# Patient Record
Sex: MALE | Race: WHITE | NOT HISPANIC OR LATINO | Employment: OTHER | URBAN - METROPOLITAN AREA
[De-identification: names, ages, dates, MRNs, and addresses within clinical notes are randomized per-mention and may not be internally consistent; named-entity substitution may affect disease eponyms.]

---

## 2022-04-13 ENCOUNTER — HOSPITAL ENCOUNTER (OUTPATIENT)
Dept: RADIOLOGY | Age: 62
Discharge: HOME | End: 2022-04-13
Payer: COMMERCIAL

## 2022-04-13 DIAGNOSIS — K76.0 FATTY LIVER: ICD-10-CM

## 2022-04-13 DIAGNOSIS — N13.8 NODULAR PROSTATE WITH URINARY OBSTRUCTION: ICD-10-CM

## 2022-04-13 DIAGNOSIS — N40.3 NODULAR PROSTATE WITH URINARY OBSTRUCTION: ICD-10-CM

## 2022-04-13 DIAGNOSIS — R97.20 ELEVATED PROSTATE SPECIFIC ANTIGEN (PSA): ICD-10-CM

## 2022-04-13 PROCEDURE — A9585 GADOBUTROL INJECTION: HCPCS | Performed by: RADIOLOGY

## 2022-04-13 PROCEDURE — 76705 ECHO EXAM OF ABDOMEN: CPT

## 2022-04-13 PROCEDURE — 76377 3D RENDER W/INTRP POSTPROCES: CPT

## 2022-04-13 PROCEDURE — 72197 MRI PELVIS W/O & W/DYE: CPT

## 2022-04-13 RX ADMIN — GADOBUTROL 9 ML: 604.72 INJECTION INTRAVENOUS at 09:10

## 2022-04-19 ENCOUNTER — TELEPHONE (OUTPATIENT)
Dept: UROLOGY | Facility: AMBULATORY SURGERY CENTER | Age: 62
End: 2022-04-19

## 2022-04-19 NOTE — TELEPHONE ENCOUNTER
Please Triage  New Patient    What is the reason for the patients appointment? MRI findings for prostrate  He stated that his prostrate is elevated  Patient wants to see either Robby Yao or Kimberly Duque    Patient is out of town until next week but no appointments available until July  Patient feels he needs to be seen before then  What office location does the patient prefer? [place]    Imaging/Lab Results: no     Do we accept the patient's insurance or is the patient Self-Pay? Insurance Provider: [facility]  Plan Type/Number:  Member ID#: Has the patient had any previous Urologist(s)? No     Have patient records been requested? If not are records showing in Epic: epic    Has the patient had any outside testing done? epic    Does the patient have a personal history of cancer?  No     Patient can be reached at [phone]

## 2022-04-20 NOTE — TELEPHONE ENCOUNTER
Patient called stating he was returning call to schedule an appointment as new patient   Mri results showed a mass on his prostate  Patient was traveling yesterday and missed the call       Patient can be reached at [phone]

## 2022-04-20 NOTE — TELEPHONE ENCOUNTER
Called and spoke with patient  He was scheduled for appointment with Dr Douglas Rubin on 4/28 in the [facility] office  He was provided with office address

## 2022-04-27 NOTE — PROGRESS NOTES
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  Past Medical History:   History reviewed  No pertinent past medical history  PAST SURGICAL HISTORY:     Past Surgical History:   Procedure Laterality Date    VASECTOMY         CURRENT MEDICATIONS:     Current Outpatient Medications   Medication Sig Dispense Refill    aspirin (ECOTRIN LOW STRENGTH) 81 mg EC tablet Take 81 mg by mouth daily      irbesartan (AVAPRO) 150 mg tablet       rosuvastatin (CRESTOR) 5 mg tablet        No current facility-administered medications for this visit         ALLERGIES:   No Known Allergies    SOCIAL HISTORY:     Social History     Socioeconomic History    Marital status: /Civil Union     Spouse name: None    Number of children: None    Years of education: None    Highest education level: None   Occupational History    None   Tobacco Use    Smoking status: Never Smoker    Smokeless tobacco: Never Used   Vaping Use    Vaping Use: Never used   Substance and Sexual Activity    Alcohol use: Yes     Comment: social    Drug use: Never    Sexual activity: None   Other Topics Concern    None   Social History Narrative    None     Social Determinants of Health     Financial Resource Strain: Not on file   Food Insecurity: Not on file   Transportation Needs: Not on file   Physical Activity: Not on file   Stress: Not on file   Social Connections: Not on file   Intimate Partner Violence: Not on file   Housing Stability: Not on file       SOCIAL HISTORY:     Family History   Problem Relation Age of Onset    Hypertension Father     Sickle cell trait Mother     Hypertension Paternal Grandfather     Sickle cell trait Maternal Grandmother     Sickle cell trait Maternal Grandfather        REVIEW OF SYSTEMS:     Review of Systems   Constitutional: Negative  Respiratory: Negative  Cardiovascular: Negative  Gastrointestinal: Negative  Genitourinary: Positive for scrotal swelling  Musculoskeletal: Negative  Skin: Negative  Psychiatric/Behavioral: Negative  PHYSICAL EXAM:     /64 (BP Location: Left arm, Patient Position: Sitting, Cuff Size: Adult)   Pulse 69   Ht 5' 9" (1 753 m)   Wt 110 kg (243 lb)   SpO2 97%   BMI 35 88 kg/m²     General:  Healthy appearing male in no acute distress  They have a normal affect  There is not appear to be any gross neurologic defects or abnormalities  HEENT:  Normocephalic, atraumatic  Neck is supple without any palpable lymphadenopathy  Cardiovascular:  Patient has normal palpable distal radial pulses  There is no significant peripheral edema  No JVD is noted  Respiratory:  Patient has unlabored respirations  There is no audible wheeze or rhonchi  Abdomen:  Abdomen is without surgical scars  Abdomen is soft and nontender  There is no tympany  Inguinal and umbilical hernia are not appreciated  Genitourinary: no penile lesions or discharge, no testicular masses or tenderness, grade 2 varicocele on the right, no hernias, small 0 5 cm nodule on the right apex of the prostate    Musculoskeletal:  Patient does not have significant CVA tenderness in the  flank with palpation or percussion  They full range of motion in all 4 extremities  Strength in all 4 extremities appears congruent  Patient is able to ambulate without assistance or difficulty  Dermatologic:  Patient has no skin abnormalities or rashes        LABS:     CBC: No results found for: WBC, HGB, HCT, MCV, PLT    BMP: No results found for: GLUCOSE, CALCIUM, NA, K, CO2, CL, BUN, CREATININE      IMAGIN/13/22  MULTIPARAMETRIC MRI OF THE PROSTATE WITH AND WITHOUT CONTRAST-WITH 3-D POSTPROCESSING      INDICATION:   R97 20: Elevated prostate specific antigen (PSA)  N40 3: Nodular prostate with lower urinary tract symptoms  N13 8: Other obstructive and reflux uropathy      COMPARISON: None     PSA LEVEL: 2 6 ng/ml  2022  PRIOR BIOPSY DATE: Unknown  BIOPSY RESULTS: Unknown      TECHNIQUE: The following pulse sequences were obtained:  Small field-of-view axial T1-weighted and multiplanar T2-weighted images; DWI axial and ADC map; large field of view axial T2 weighted images; T1w in-phase and opposed-phase axials of entire pelvis   and dynamic 3D T1w axial before and during IV contrast injection  Imaging performed on 3 0T MRI      CONTRAST:  Gadobutrol (Gadavist) 9 mL of Gadobutrol injection (SINGLE-DOSE)     TECHNICAL LIMITATIONS: None      FINDINGS:     PROSTATE:     Size: 5 1 x 4 4 x 4 6 cm = 49 7 cc  Post-biopsy hemorrhage:  None  Central gland enlargement (BPH): Mild      Focal lesions -      Lesion: 1       Size: 1 2 x 0 8 x 0 9 cm, 0 49 cc  Location: Right posterior medial peripheral zone at the apex       T2-weighted images: Score 3: Heterogeneous signal or noncircumscribed, rounded, moderate hypointensity; includes others that do not qualify as 2, 4 or 5  Diffusion-weighted images: Score 4: Focal markedly hypointense on ADC and markedly hyperintense on high b-value DWI; less than 1 5 cm in greatest dimension  Dynamic post-contrast images: (-) Lesion that does not enhance early compared to the surrounding prostate or enhances diffusely so that the margins of the enhancing area do not correspond to a finding on T2-weighted images and/or DWI  PI-RADS Assessment Category: 4, High (clinically significant cancer is likely to be present)    Extra-prostatic extension (EPE): Abuts capsule without visualized EPE      SEMINAL VESICLES: Unremarkable     Note: Clinically significant cancer is defined on pathology/histology as [place] score greater than or equal to 7, and/or volume of greater than or equal to 0 5 mL, and/or extraprostatic extension      URINARY BLADDER: Unremarkable      LYMPH NODES: No pelvic lymphadenopathy      BONES: No suspicious osseous lesion      There are bilateral fat-containing inguinal hernias  There is diverticulosis coli      IMPRESSION:     1  PI-RADSv2 1 Category 4 -High (clinically significant cancer is likely to be present)  Right posterior medial peripheral zone at the apex      2  No extraprostatic tumor, seminal vesicle invasion, pelvic lymphadenopathy, or pelvic osseous metastatic disease      3  Calculated prostate volume of 49 7 cc  ASSESSMENT:     57 y o  male with abnormal multiparametric MRI and rectal a    PLAN:     I would agree there is a small subtle abnormality on the right apex the patient's prostate, this does correlate with his multiparametric MRI  We discussed transperineal fusion biopsy of the prostate, how the procedure is performed the inherent risks and benefits  Patient and his wife were agreeable to proceed  Patient does have a right-sided varicocele  Prior imaging from Care everywhere was reviewed  Patient has a small renal cyst but no other significant abnormalities in the right retroperitoneum  Could consider referral for microscopic varicocelectomy versus IR angio embolization if the patient desired after workup for prostate cancer

## 2022-04-28 ENCOUNTER — OFFICE VISIT (OUTPATIENT)
Dept: UROLOGY | Facility: MEDICAL CENTER | Age: 62
End: 2022-04-28
Payer: COMMERCIAL

## 2022-04-28 VITALS
OXYGEN SATURATION: 97 % | BODY MASS INDEX: 35.99 KG/M2 | SYSTOLIC BLOOD PRESSURE: 112 MMHG | HEIGHT: 69 IN | WEIGHT: 243 LBS | HEART RATE: 69 BPM | DIASTOLIC BLOOD PRESSURE: 64 MMHG

## 2022-04-28 DIAGNOSIS — R93.89 ABNORMAL MRI: Primary | ICD-10-CM

## 2022-04-28 DIAGNOSIS — N42.9 ABNORMALITY DETECTED ON RECTAL EXAMINATION OF PROSTATE: ICD-10-CM

## 2022-04-28 PROCEDURE — 3008F BODY MASS INDEX DOCD: CPT | Performed by: UROLOGY

## 2022-04-28 PROCEDURE — 1036F TOBACCO NON-USER: CPT | Performed by: UROLOGY

## 2022-04-28 PROCEDURE — 99204 OFFICE O/P NEW MOD 45 MIN: CPT | Performed by: UROLOGY

## 2022-04-28 RX ORDER — ASPIRIN 81 MG/1
81 TABLET ORAL DAILY
COMMUNITY

## 2022-04-28 RX ORDER — ROSUVASTATIN CALCIUM 5 MG/1
5 TABLET, COATED ORAL EVERY OTHER DAY
COMMUNITY
Start: 2022-04-01 | End: 2022-06-20 | Stop reason: SDUPTHER

## 2022-04-28 RX ORDER — IRBESARTAN 150 MG/1
TABLET ORAL
COMMUNITY
Start: 2022-04-01 | End: 2022-06-30 | Stop reason: SDUPTHER

## 2022-04-28 NOTE — PATIENT INSTRUCTIONS
Varicocele   WHAT YOU NEED TO KNOW:   What is a varicocele? A varicocele is a condition that causes the veins in your scrotum to become enlarged and dilated  The scrotum is the sac that holds the testicles  A varicocele can cause infertility because it prevents sperm from flowing out of the testicles  What causes a varicocele? A varicocele occurs when the valves within the veins in the scrotum do not work properly  Valves open and close to keep the blood flowing in one direction  When the valves do not work properly, blood backs up and causes the veins to dilate and swell  What are the signs and symptoms of a varicocele? · A mass or swelling that is like a bag of worms    · Veins that are swollen and twisted    · Mild discomfort    How is a varicocele diagnosed? Your healthcare provider will examine your scrotum while you stand  He may ask you to take a deep breath, hold it, and bear down like you are having a bowel movement  You may also need the following:  · An ultrasound  may show the varicocele  · A spermatic venography  may show the position of the veins in your scrotum  You may be given contrast dye to help the veins show up better in the pictures  Tell a healthcare provider if you have ever had an allergic reaction to contrast dye  How is a varicocele treated? · NSAIDs , such as ibuprofen, help decrease swelling, pain, and fever  This medicine is available with or without a doctor's order  NSAIDs can cause stomach bleeding or kidney problems in certain people  If you take blood thinner medicine, always ask your healthcare provider if NSAIDs are safe for you  Always read the medicine label and follow directions  · An athletic supporter  may reduce pressure and treat your varicocele  · Percutaneous embolization  is a procedure to create scarring in your veins  The scars form a blockage that causes the blood to flow around the varicocele       · Surgery  may be needed to cut or tie off the blocked veins  This will cause the blood to flow around the blockage and heal the varicocele  When should I contact my healthcare provider? · You have pain in your scrotum  · You have a lump on your scrotum  · You have questions or concerns about your condition or care  CARE AGREEMENT:   You have the right to help plan your care  Learn about your health condition and how it may be treated  Discuss treatment options with your healthcare providers to decide what care you want to receive  You always have the right to refuse treatment  The above information is an  only  It is not intended as medical advice for individual conditions or treatments  Talk to your doctor, nurse or pharmacist before following any medical regimen to see if it is safe and effective for you  © Copyright [employer] 2022 Information is for End User's use only and may not be sold, redistributed or otherwise used for commercial purposes   All illustrations and images included in CareNotes® are the copyrighted property of [employer]  or [address] [place] [address]

## 2022-04-29 ENCOUNTER — TELEPHONE (OUTPATIENT)
Dept: UROLOGY | Facility: AMBULATORY SURGERY CENTER | Age: 62
End: 2022-04-29

## 2022-04-29 NOTE — TELEPHONE ENCOUNTER
Patients wife Delano Friday called to start the process of scheduling a biopsy fir her   Would like a call back ASAP      Delano Friday might be reached at [phone]

## 2022-04-29 NOTE — TELEPHONE ENCOUNTER
I spoke to [place] and advised that we are working on additional days for these procedures as the current 1st available is 7/5/2022  I assured her that we should have a sooner date by the end of next week and I will call to schedule

## 2022-05-04 NOTE — TELEPHONE ENCOUNTER
Please see 4/29/2022 encounter note  I advised patients spouse at that time that I would call be end of this week  We are still working on additional days as of today

## 2022-05-05 NOTE — TELEPHONE ENCOUNTER
I spoke to the patients wife and scheduled his procedure for 6/8/2022 at [facility] with Dr Roberto Stein      -instructions given verbally and Emailed  -patient aware to be NPO, needs a  and use an enema 1 hour prior to leaving the house morning of procedure  -patient aware to avoid any potentially blood thinning medications 7 days prior  -CBC, CMP, Urine C&S and EKG 2 weeks prior  -[facility] - on auth tracker 5/5/2022

## 2022-05-11 ENCOUNTER — ANESTHESIA EVENT (OUTPATIENT)
Dept: PERIOP | Facility: AMBULARY SURGERY CENTER | Age: 62
End: 2022-05-11
Payer: COMMERCIAL

## 2022-05-24 ENCOUNTER — TELEPHONE (OUTPATIENT)
Dept: UROLOGY | Facility: MEDICAL CENTER | Age: 62
End: 2022-05-24

## 2022-05-28 ENCOUNTER — LAB (OUTPATIENT)
Dept: LAB | Facility: HOSPITAL | Age: 62
End: 2022-05-28
Payer: COMMERCIAL

## 2022-05-28 DIAGNOSIS — R97.20 ELEVATED PROSTATE SPECIFIC ANTIGEN (PSA): ICD-10-CM

## 2022-05-28 LAB
ALBUMIN SERPL BCP-MCNC: 3.9 G/DL (ref 3.5–5)
ALP SERPL-CCNC: 97 U/L (ref 46–116)
ALT SERPL W P-5'-P-CCNC: 57 U/L (ref 12–78)
ANION GAP SERPL CALCULATED.3IONS-SCNC: 6 MMOL/L (ref 4–13)
AST SERPL W P-5'-P-CCNC: 32 U/L (ref 5–45)
ATRIAL RATE: 67 BPM
BASOPHILS # BLD AUTO: 0.03 THOUSANDS/ΜL (ref 0–0.1)
BASOPHILS NFR BLD AUTO: 1 % (ref 0–1)
BILIRUB SERPL-MCNC: 0.76 MG/DL (ref 0.2–1)
BUN SERPL-MCNC: 22 MG/DL (ref 5–25)
CALCIUM SERPL-MCNC: 9.3 MG/DL (ref 8.3–10.1)
CHLORIDE SERPL-SCNC: 101 MMOL/L (ref 100–108)
CO2 SERPL-SCNC: 28 MMOL/L (ref 21–32)
CREAT SERPL-MCNC: 1.14 MG/DL (ref 0.6–1.3)
EOSINOPHIL # BLD AUTO: 0.19 THOUSAND/ΜL (ref 0–0.61)
EOSINOPHIL NFR BLD AUTO: 3 % (ref 0–6)
ERYTHROCYTE [DISTWIDTH] IN BLOOD BY AUTOMATED COUNT: 13 % (ref 11.6–15.1)
GFR SERPL CREATININE-BSD FRML MDRD: 68 ML/MIN/1.73SQ M
GLUCOSE P FAST SERPL-MCNC: 100 MG/DL (ref 65–99)
HCT VFR BLD AUTO: 48.6 % (ref 36.5–49.3)
HGB BLD-MCNC: 16 G/DL (ref 12–17)
IMM GRANULOCYTES # BLD AUTO: 0.03 THOUSAND/UL (ref 0–0.2)
IMM GRANULOCYTES NFR BLD AUTO: 1 % (ref 0–2)
LYMPHOCYTES # BLD AUTO: 1.17 THOUSANDS/ΜL (ref 0.6–4.47)
LYMPHOCYTES NFR BLD AUTO: 19 % (ref 14–44)
MCH RBC QN AUTO: 29.1 PG (ref 26.8–34.3)
MCHC RBC AUTO-ENTMCNC: 32.9 G/DL (ref 31.4–37.4)
MCV RBC AUTO: 88 FL (ref 82–98)
MONOCYTES # BLD AUTO: 0.6 THOUSAND/ΜL (ref 0.17–1.22)
MONOCYTES NFR BLD AUTO: 10 % (ref 4–12)
NEUTROPHILS # BLD AUTO: 4.16 THOUSANDS/ΜL (ref 1.85–7.62)
NEUTS SEG NFR BLD AUTO: 66 % (ref 43–75)
NRBC BLD AUTO-RTO: 0 /100 WBCS
P AXIS: 36 DEGREES
PLATELET # BLD AUTO: 224 THOUSANDS/UL (ref 149–390)
PMV BLD AUTO: 10.3 FL (ref 8.9–12.7)
POTASSIUM SERPL-SCNC: 4.6 MMOL/L (ref 3.5–5.3)
PR INTERVAL: 158 MS
PROT SERPL-MCNC: 7.5 G/DL (ref 6.4–8.2)
QRS AXIS: -3 DEGREES
QRSD INTERVAL: 100 MS
QT INTERVAL: 420 MS
QTC INTERVAL: 443 MS
RBC # BLD AUTO: 5.5 MILLION/UL (ref 3.88–5.62)
SODIUM SERPL-SCNC: 135 MMOL/L (ref 136–145)
T WAVE AXIS: 27 DEGREES
VENTRICULAR RATE: 67 BPM
WBC # BLD AUTO: 6.18 THOUSAND/UL (ref 4.31–10.16)

## 2022-05-28 PROCEDURE — 36415 COLL VENOUS BLD VENIPUNCTURE: CPT

## 2022-05-28 PROCEDURE — 85025 COMPLETE CBC W/AUTO DIFF WBC: CPT

## 2022-05-28 PROCEDURE — 93010 ELECTROCARDIOGRAM REPORT: CPT | Performed by: INTERNAL MEDICINE

## 2022-05-28 PROCEDURE — 87086 URINE CULTURE/COLONY COUNT: CPT

## 2022-05-28 PROCEDURE — 80053 COMPREHEN METABOLIC PANEL: CPT

## 2022-05-28 PROCEDURE — 93005 ELECTROCARDIOGRAM TRACING: CPT

## 2022-05-29 LAB — BACTERIA UR CULT: NORMAL

## 2022-05-31 NOTE — TELEPHONE ENCOUNTER
Wife called to speak with Cielo Yoon to verify pt was schedule for a transperineal biopsy and not the transrectal  Verified that is what is listed in the chart  Verbalizes not other questions

## 2022-06-01 ENCOUNTER — TELEPHONE (OUTPATIENT)
Dept: UROLOGY | Facility: AMBULATORY SURGERY CENTER | Age: 62
End: 2022-06-01

## 2022-06-01 NOTE — TELEPHONE ENCOUNTER
Called and spoke to pt about the prep for his upcoming procedure  I am calling in regards to your prep instructions for your appointment at the [facility] on 6/8/22 with Dr Maggie Eng  under IV Se  We need you to please make sure to stop all blood thinners (including multivitamins) ( Eliquis or Brigitte Aly should be clarified with Cardiology before stopping) 7 day prior to you appointment  Pt should have nothing to eat after midnight the day before the procedure  The pt will need to make sure they have a   Finally the Pt will need to use an enema at least 2 hour prior to the appointment  If you have any questions please call [phone] and ask for [facility] "    Patient understood all prep and has no further questions at this time

## 2022-06-06 NOTE — PRE-PROCEDURE INSTRUCTIONS
Pre-Surgery Instructions:   Medication Instructions    aspirin (ECOTRIN LOW STRENGTH) 81 mg EC tablet Stop taking 7 days prior to surgery   irbesartan (AVAPRO) 150 mg tablet Hold day of surgery   rosuvastatin (CRESTOR) 5 mg tablet Hold day of surgery  NPO after midnight, meds in am with sips of water  Understands when to expect preop phone call  Remove all jewelry  Advised of visitation policy   Before your operation, you play an important role in decreasing your risk for infection by washing with special antiseptic soap  This is an effective way to reduce bacteria on the skin which may help to prevent infections at the surgical site  Please read the following directions in advance  1  In the week before your operation purchase a 4 ounce bottle of antiseptic soap containing chlorhexidine gluconate 4%  Some brand names include: Aplicare, Endure, and Hibiclens  The cost is usually less than $5 00  · For your convenience, the [address] [place] [address] carries the soap  · It may also be available at your doctor's office or pre-admission testing center, and at most retail pharmacies  · If you are allergic or sensitive to soaps containing chlorhexidine gluconate (CHG), please let your doctor know so another antiseptic soap can be suggested  · CHG antiseptic soap is for external use only  2      The day before your operation follow these directions carefully to get ready  · Place clean lines (sheets) on your bed; you should sleep on clean sheets after your evening shower  · Get clean towels and washcloths ready - you need enough for 2 showers  · Set aside clean underwear, pajamas, and clothing to wear after the shower  Reminders:  · DO NOT use any other soap or body rinse on your skin during or after the antiseptic showers  · DO NOT use lotion , powder, deodorant, or perfume/aftershave of any kind on your skin after your antiseptic shower    · DO NOT shave any body parts in the 24 hours/the day before your operation  · DO NOT get the antiseptic soap in your eyes, ears, nose, mouth, or vaginal area  3      You will need to shower the night before AND the morning of your Surgery  Shower 1:  · The evening before your operation, take the fist shower  · First, shampoo your hair with regular shampoo and rinse it completely before you use the anitseptic soap  After washing and rinsing your hair, rinse your body  · Next, use a clean wash cloth to apply the antiseptic soap and wash your body from the neck down to your toes using 1/2 bottle of the antiseptic soap  You will use the other 1/2 bottle for the second shower  · Clean the area where your incision will be; later this area well for about 2 minutes  · If you ar having head or neck surgery, wash areas with the antiseptic soap  · Rinse yourself completely with running water  · Use a clean towel to dry off  · Wear clean underwear and clothing/pajamas  Shower 2:  · The Morning of your operation, take the second shower following the same steps as Shower 1 using the second 1/2 of the bottle of antiseptic soap  · Use clean cloths and towels to was and dry yourself off  · Wear clean underwear and clothing

## 2022-06-08 ENCOUNTER — ANESTHESIA (OUTPATIENT)
Dept: PERIOP | Facility: AMBULARY SURGERY CENTER | Age: 62
End: 2022-06-08
Payer: COMMERCIAL

## 2022-06-08 ENCOUNTER — HOSPITAL ENCOUNTER (OUTPATIENT)
Facility: AMBULARY SURGERY CENTER | Age: 62
Setting detail: OUTPATIENT SURGERY
Discharge: HOME | End: 2022-06-08
Attending: UROLOGY | Admitting: UROLOGY
Payer: COMMERCIAL

## 2022-06-08 VITALS
TEMPERATURE: 97 F | RESPIRATION RATE: 16 BRPM | HEART RATE: 78 BPM | DIASTOLIC BLOOD PRESSURE: 78 MMHG | OXYGEN SATURATION: 97 % | SYSTOLIC BLOOD PRESSURE: 139 MMHG

## 2022-06-08 DIAGNOSIS — R97.20 ELEVATED PROSTATE SPECIFIC ANTIGEN (PSA): ICD-10-CM

## 2022-06-08 PROCEDURE — G0416 PROSTATE BIOPSY, ANY MTHD: HCPCS | Performed by: PATHOLOGY

## 2022-06-08 PROCEDURE — 88344 IMHCHEM/IMCYTCHM EA MLT ANTB: CPT | Performed by: PATHOLOGY

## 2022-06-08 PROCEDURE — 76942 ECHO GUIDE FOR BIOPSY: CPT | Performed by: UROLOGY

## 2022-06-08 PROCEDURE — 55700 PR BIOPSY OF PROSTATE,NEEDLE/PUNCH: CPT | Performed by: UROLOGY

## 2022-06-08 PROCEDURE — NC001 PR NO CHARGE: Performed by: UROLOGY

## 2022-06-08 RX ORDER — FENTANYL CITRATE/PF 50 MCG/ML
25 SYRINGE (ML) INJECTION
Status: DISCONTINUED | OUTPATIENT
Start: 2022-06-08 | End: 2022-06-08 | Stop reason: HOSPADM

## 2022-06-08 RX ORDER — ACETAMINOPHEN 325 MG/1
650 TABLET ORAL EVERY 6 HOURS PRN
Status: DISCONTINUED | OUTPATIENT
Start: 2022-06-08 | End: 2022-06-08 | Stop reason: HOSPADM

## 2022-06-08 RX ORDER — ONDANSETRON 2 MG/ML
4 INJECTION INTRAMUSCULAR; INTRAVENOUS ONCE AS NEEDED
Status: DISCONTINUED | OUTPATIENT
Start: 2022-06-08 | End: 2022-06-08 | Stop reason: HOSPADM

## 2022-06-08 RX ORDER — LIDOCAINE HYDROCHLORIDE 10 MG/ML
INJECTION, SOLUTION EPIDURAL; INFILTRATION; INTRACAUDAL; PERINEURAL AS NEEDED
Status: DISCONTINUED | OUTPATIENT
Start: 2022-06-08 | End: 2022-06-08

## 2022-06-08 RX ORDER — CEFAZOLIN SODIUM 2 G/50ML
2000 SOLUTION INTRAVENOUS ONCE
Status: DISCONTINUED | OUTPATIENT
Start: 2022-06-08 | End: 2022-06-08 | Stop reason: HOSPADM

## 2022-06-08 RX ORDER — MIDAZOLAM HYDROCHLORIDE 2 MG/2ML
INJECTION, SOLUTION INTRAMUSCULAR; INTRAVENOUS AS NEEDED
Status: DISCONTINUED | OUTPATIENT
Start: 2022-06-08 | End: 2022-06-08

## 2022-06-08 RX ORDER — FENTANYL CITRATE 50 UG/ML
INJECTION, SOLUTION INTRAMUSCULAR; INTRAVENOUS AS NEEDED
Status: DISCONTINUED | OUTPATIENT
Start: 2022-06-08 | End: 2022-06-08

## 2022-06-08 RX ORDER — CEFAZOLIN SODIUM 1 G/3ML
INJECTION, POWDER, FOR SOLUTION INTRAMUSCULAR; INTRAVENOUS AS NEEDED
Status: DISCONTINUED | OUTPATIENT
Start: 2022-06-08 | End: 2022-06-08

## 2022-06-08 RX ORDER — PROPOFOL 10 MG/ML
INJECTION, EMULSION INTRAVENOUS CONTINUOUS PRN
Status: DISCONTINUED | OUTPATIENT
Start: 2022-06-08 | End: 2022-06-08

## 2022-06-08 RX ORDER — SODIUM CHLORIDE, SODIUM LACTATE, POTASSIUM CHLORIDE, CALCIUM CHLORIDE 600; 310; 30; 20 MG/100ML; MG/100ML; MG/100ML; MG/100ML
INJECTION, SOLUTION INTRAVENOUS CONTINUOUS PRN
Status: DISCONTINUED | OUTPATIENT
Start: 2022-06-08 | End: 2022-06-08

## 2022-06-08 RX ORDER — ONDANSETRON 2 MG/ML
INJECTION INTRAMUSCULAR; INTRAVENOUS AS NEEDED
Status: DISCONTINUED | OUTPATIENT
Start: 2022-06-08 | End: 2022-06-08

## 2022-06-08 RX ORDER — LIDOCAINE HYDROCHLORIDE 10 MG/ML
INJECTION, SOLUTION EPIDURAL; INFILTRATION; INTRACAUDAL; PERINEURAL AS NEEDED
Status: DISCONTINUED | OUTPATIENT
Start: 2022-06-08 | End: 2022-06-08 | Stop reason: HOSPADM

## 2022-06-08 RX ADMIN — CEFAZOLIN SODIUM 2000 MG: 1 INJECTION, POWDER, FOR SOLUTION INTRAMUSCULAR; INTRAVENOUS at 09:54

## 2022-06-08 RX ADMIN — LIDOCAINE HYDROCHLORIDE 50 MG: 10 INJECTION, SOLUTION EPIDURAL; INFILTRATION; INTRACAUDAL; PERINEURAL at 09:59

## 2022-06-08 RX ADMIN — ACETAMINOPHEN 650 MG: 325 TABLET ORAL at 11:02

## 2022-06-08 RX ADMIN — FENTANYL CITRATE 25 MCG: 50 INJECTION INTRAMUSCULAR; INTRAVENOUS at 09:51

## 2022-06-08 RX ADMIN — SODIUM CHLORIDE, SODIUM LACTATE, POTASSIUM CHLORIDE, AND CALCIUM CHLORIDE: .6; .31; .03; .02 INJECTION, SOLUTION INTRAVENOUS at 09:18

## 2022-06-08 RX ADMIN — PROPOFOL 150 MCG/KG/MIN: 10 INJECTION, EMULSION INTRAVENOUS at 09:51

## 2022-06-08 RX ADMIN — MIDAZOLAM 2 MG: 1 INJECTION INTRAMUSCULAR; INTRAVENOUS at 09:46

## 2022-06-08 RX ADMIN — ONDANSETRON 4 MG: 2 INJECTION INTRAMUSCULAR; INTRAVENOUS at 10:18

## 2022-06-08 RX ADMIN — FENTANYL CITRATE 25 MCG: 50 INJECTION INTRAMUSCULAR; INTRAVENOUS at 10:04

## 2022-06-08 NOTE — ANESTHESIA PREPROCEDURE EVALUATION
Procedure:  TRANSPERINEAL MRI FUSION BIOPSY PROSTATE (N/A Perineum)    Relevant Problems   /RENAL   (+) Abnormality detected on rectal examination of prostate        Physical Exam    Airway    Mallampati score: II  TM Distance: >3 FB  Neck ROM: full     Dental       Cardiovascular      Pulmonary      Other Findings        Anesthesia Plan  ASA Score- 2     Anesthesia Type- IV sedation with anesthesia with ASA Monitors  Additional Monitors:   Airway Plan:           Plan Factors-Exercise tolerance (METS): >4 METS  Chart reviewed  EKG reviewed  Imaging results reviewed  Existing labs reviewed  Patient summary reviewed  Patient is not a current smoker  Patient did not smoke on day of surgery  Obstructive sleep apnea risk education given perioperatively  Induction- intravenous  Postoperative Plan- Plan for postoperative opioid use  Informed Consent- Anesthetic plan and risks discussed with patient  I personally reviewed this patient with the CRNA  Discussed and agreed on the Anesthesia Plan with the CRNA             NPO appropriate  Discussed benefits/risks of monitored anesthetic care and discussed providing a dynamic level of mild to deep sedation  Risks include awareness, airway obstruction, aspiration which may necessitate conversion to general anesthesia  All questions answered  Patient understands and wishes to proceed  Anesthesia plan and consent discussed with Edwin Buck who expressed understanding and agreement  Risks/benefits and alternatives discussed with patient including possible PONV, sore throat, damage to teeth/lips/gums and possibility of rare anesthetic and surgical emergencies

## 2022-06-08 NOTE — ANESTHESIA POSTPROCEDURE EVALUATION
Post-Op Assessment Note    CV Status:  Stable  Pain Score: 0    Pain management: adequate     Mental Status:  Alert and awake   Hydration Status:  Euvolemic   PONV Controlled:  Controlled   Airway Patency:  Patent      Post Op Vitals Reviewed: Yes      Staff: CRNA         No complications documented      BP   95/56   Temp  97 7   Pulse  72   Resp   12   SpO2   96

## 2022-06-08 NOTE — H&P
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        Past Medical History:   Medical History   History reviewed  No pertinent past medical history         PAST SURGICAL HISTORY:      Surgical History         Past Surgical History:   Procedure Laterality Date    VASECTOMY                CURRENT MEDICATIONS:      Current Medications          Current Outpatient Medications   Medication Sig Dispense Refill    aspirin (ECOTRIN LOW STRENGTH) 81 mg EC tablet Take 81 mg by mouth daily        irbesartan (AVAPRO) 150 mg tablet          rosuvastatin (CRESTOR) 5 mg tablet            No current facility-administered medications for this visit             ALLERGIES:   No Known Allergies     SOCIAL HISTORY:      Social History   Social History            Socioeconomic History    Marital status: /Civil Union       Spouse name: None    Number of children: None    Years of education: None    Highest education level: None   Occupational History    None   Tobacco Use    Smoking status: Never Smoker    Smokeless tobacco: Never Used   Vaping Use    Vaping Use: Never used   Substance and Sexual Activity    Alcohol use:  Yes       Comment: social    Drug use: Never    Sexual activity: None   Other Topics Concern    None   Social History Narrative    None      Social Determinants of Health      Financial Resource Strain: Not on file   Food Insecurity: Not on file   Transportation Needs: Not on file   Physical Activity: Not on file   Stress: Not on file   Social Connections: Not on file   Intimate Partner Violence: Not on file   Housing Stability: Not on file            SOCIAL HISTORY:            Family History   Problem Relation Age of Onset    Hypertension Father      Sickle cell trait Mother      Hypertension Paternal Grandfather      Sickle cell trait Maternal Grandmother      Sickle cell trait Maternal Grandfather           REVIEW OF SYSTEMS:      Review of Systems   Constitutional: Negative  Respiratory: Negative  Cardiovascular: Negative  Gastrointestinal: Negative  Genitourinary: Positive for scrotal swelling  Musculoskeletal: Negative  Skin: Negative  Psychiatric/Behavioral: Negative              PHYSICAL EXAM:      /76   Pulse 78   Temp (!) 96 2 °F (35 7 °C) (Temporal)   Resp 18   SpO2 97%        General:  Healthy appearing male in no acute distress  They have a normal affect  There is not appear to be any gross neurologic defects or abnormalities  HEENT:  Normocephalic, atraumatic  Neck is supple without any palpable lymphadenopathy  Cardiovascular:  Patient has normal palpable distal radial pulses  There is no significant peripheral edema  No JVD is noted  RRR  Respiratory:  Patient has unlabored respirations  There is no audible wheeze or rhonchi  Clear bilaterally  Abdomen:  Abdomen is without surgical scars  Abdomen is soft and nontender  There is no tympany  Inguinal and umbilical hernia are not appreciated      Genitourinary: no penile lesions or discharge, no testicular masses or tenderness, grade 2 varicocele on the right, no hernias, small 0 5 cm nodule on the right apex of the prostate     Musculoskeletal:  Patient does not have significant CVA tenderness in the  flank with palpation or percussion  They full range of motion in all 4 extremities  Strength in all 4 extremities appears congruent  Patient is able to ambulate without assistance or difficulty    Dermatologic:  Patient has no skin abnormalities or terra         LABS:      CBC: No results found for: WBC, HGB, HCT, MCV, PLT     BMP: No results found for: GLUCOSE, CALCIUM, NA, K, CO2, CL, BUN, CREATININE       IMAGIN/13/22  MULTIPARAMETRIC MRI OF THE PROSTATE WITH AND WITHOUT CONTRAST-WITH 3-D POSTPROCESSING      INDICATION:   F66 03: Elevated prostate specific antigen (PSA)  N40 3: Nodular prostate with lower urinary tract symptoms  N13 8: Other obstructive and reflux uropathy      COMPARISON: None     PSA LEVEL: 2 6 ng/ml  2022  PRIOR BIOPSY DATE: Unknown  BIOPSY RESULTS: Unknown      TECHNIQUE: The following pulse sequences were obtained:  Small field-of-view axial T1-weighted and multiplanar T2-weighted images; DWI axial and ADC map; large field of view axial T2 weighted images; T1w in-phase and opposed-phase axials of entire pelvis   and dynamic 3D T1w axial before and during IV contrast injection  Imaging performed on 3 0T MRI      CONTRAST:  Gadobutrol (Gadavist) 9 mL of Gadobutrol injection (SINGLE-DOSE)     TECHNICAL LIMITATIONS: None      FINDINGS:     PROSTATE:     Size: 5 1 x 4 4 x 4 6 cm = 49 7 cc      Post-biopsy hemorrhage:  None     Central gland enlargement (BPH): Mild      Focal lesions -      Lesion: 1       Size: 1 2 x 0 8 x 0 9 cm, 0 49 cc        Location: Right posterior medial peripheral zone at the apex       T2-weighted images: Score 3: Heterogeneous signal or noncircumscribed, rounded, moderate hypointensity; includes others that do not qualify as 2, 4 or 5        Diffusion-weighted images: Score 4: Focal markedly hypointense on ADC and markedly hyperintense on high b-value DWI; less than 1 5 cm in greatest dimension        Dynamic post-contrast images: (-) Lesion that does not enhance early compared to the surrounding prostate or enhances diffusely so that the margins of the enhancing area do not correspond to a finding on T2-weighted images and/or DWI    PI-RADS Assessment Category: 4, High (clinically significant cancer is likely to be present)  Extra-prostatic extension (EPE): Abuts capsule without visualized EPE      SEMINAL VESICLES: Unremarkable     Note: Clinically significant cancer is defined on pathology/histology as Pamela score greater than or equal to 7, and/or volume of greater than or equal to 0 5 mL, and/or extraprostatic extension      URINARY BLADDER: Unremarkable      LYMPH NODES: No pelvic lymphadenopathy      BONES: No suspicious osseous lesion      There are bilateral fat-containing inguinal hernias   There is diverticulosis coli      IMPRESSION:     1  PI-RADSv2 1 Category 4 -High (clinically significant cancer is likely to be present)  Right posterior medial peripheral zone at the apex      2  No extraprostatic tumor, seminal vesicle invasion, pelvic lymphadenopathy, or pelvic osseous metastatic disease      3  Calculated prostate volume of 49 7 cc      ASSESSMENT:      57 y o  male with abnormal multiparametric MRI and rectal a     PLAN:      I would agree there is a small subtle abnormality on the right apex the patient's prostate, this does correlate with his multiparametric MRI  We discussed transperineal fusion biopsy of the prostate, how the procedure is performed the inherent risks and benefits  Patient and his wife were agreeable to proceed        Patient does have a right-sided varicocele  [facility] imaging from [facility] was reviewed  Patient has a small renal cyst but no other significant abnormalities in the right retroperitoneum  Could consider referral for microscopic varicocelectomy versus IR angio embolization if the patient desired after workup for prostate cancer  ADDENDUM:  Transperineal MRI fusion prostate biopsy as planned  Technique, risks, benefits reviewed  Consent obtained      Tony Paige MD

## 2022-06-15 ENCOUNTER — RA CDI HCC (OUTPATIENT)
Dept: OTHER | Facility: HOSPITAL | Age: 62
End: 2022-06-15

## 2022-06-15 NOTE — PROGRESS NOTES
[facility]  coding opportunities       Chart reviewed, no opportunity found: CHART REVIEWED, NO OPPORTUNITY FOUND        Patients Insurance        Commercial Insurance: [address] [place] [address]

## 2022-06-17 NOTE — TELEPHONE ENCOUNTER
Patient's wife calling to see if the appointment with Dr Levis Meigs on Tuesday 6/21 is still available?  She would rather switch to that day    She is requesting a call back at [phone]

## 2022-06-20 ENCOUNTER — OFFICE VISIT (OUTPATIENT)
Dept: FAMILY MEDICINE CLINIC | Facility: CLINIC | Age: 62
End: 2022-06-20
Payer: COMMERCIAL

## 2022-06-20 VITALS
RESPIRATION RATE: 14 BRPM | TEMPERATURE: 98.1 F | DIASTOLIC BLOOD PRESSURE: 82 MMHG | WEIGHT: 243.6 LBS | SYSTOLIC BLOOD PRESSURE: 138 MMHG | BODY MASS INDEX: 36.08 KG/M2 | HEIGHT: 69 IN | OXYGEN SATURATION: 96 % | HEART RATE: 80 BPM

## 2022-06-20 DIAGNOSIS — J45.21 MILD INTERMITTENT REACTIVE AIRWAY DISEASE WITH ACUTE EXACERBATION: ICD-10-CM

## 2022-06-20 DIAGNOSIS — E78.5 HYPERLIPIDEMIA, UNSPECIFIED HYPERLIPIDEMIA TYPE: ICD-10-CM

## 2022-06-20 DIAGNOSIS — I47.1 SVT (SUPRAVENTRICULAR TACHYCARDIA) (HCC): ICD-10-CM

## 2022-06-20 DIAGNOSIS — G95.9 MYELOPATHY (HCC): ICD-10-CM

## 2022-06-20 DIAGNOSIS — G25.0 BENIGN ESSENTIAL TREMOR: ICD-10-CM

## 2022-06-20 DIAGNOSIS — Z00.00 WELL ADULT EXAM: Primary | ICD-10-CM

## 2022-06-20 DIAGNOSIS — C61 PROSTATE CA (HCC): ICD-10-CM

## 2022-06-20 PROBLEM — R03.0 ELEVATED BP WITHOUT DIAGNOSIS OF HYPERTENSION: Status: ACTIVE | Noted: 2022-06-20

## 2022-06-20 PROBLEM — I47.10 SVT (SUPRAVENTRICULAR TACHYCARDIA): Status: ACTIVE | Noted: 2022-06-20

## 2022-06-20 PROBLEM — R90.89 MRI OF BRAIN ABNORMAL: Status: ACTIVE | Noted: 2017-01-19

## 2022-06-20 PROBLEM — Z82.49 FAMILY HISTORY OF EARLY CAD: Status: ACTIVE | Noted: 2020-04-27

## 2022-06-20 PROBLEM — K21.9 GASTROESOPHAGEAL REFLUX DISEASE WITHOUT ESOPHAGITIS: Status: ACTIVE | Noted: 2022-06-20

## 2022-06-20 PROBLEM — I10 HTN (HYPERTENSION): Status: ACTIVE | Noted: 2020-04-11

## 2022-06-20 PROCEDURE — 99386 PREV VISIT NEW AGE 40-64: CPT | Performed by: FAMILY MEDICINE

## 2022-06-20 PROCEDURE — 3008F BODY MASS INDEX DOCD: CPT | Performed by: UROLOGY

## 2022-06-20 PROCEDURE — 3725F SCREEN DEPRESSION PERFORMED: CPT | Performed by: FAMILY MEDICINE

## 2022-06-20 RX ORDER — IRBESARTAN 75 MG/1
TABLET ORAL
COMMUNITY
Start: 2022-06-14 | End: 2022-06-30 | Stop reason: SDUPTHER

## 2022-06-20 RX ORDER — ROSUVASTATIN CALCIUM 5 MG/1
5 TABLET, COATED ORAL EVERY OTHER DAY
Qty: 90 TABLET | Refills: 3 | Status: SHIPPED | OUTPATIENT
Start: 2022-06-20

## 2022-06-20 RX ORDER — DEXAMETHASONE 4 MG/1
2 TABLET ORAL 2 TIMES DAILY
Qty: 12 G | Refills: 0 | Status: SHIPPED | OUTPATIENT
Start: 2022-06-20

## 2022-06-20 RX ORDER — MONTELUKAST SODIUM 10 MG/1
10 TABLET ORAL
Qty: 30 TABLET | Refills: 5 | Status: SHIPPED | OUTPATIENT
Start: 2022-06-20

## 2022-06-20 RX ORDER — ALBUTEROL SULFATE 90 UG/1
2 AEROSOL, METERED RESPIRATORY (INHALATION) EVERY 6 HOURS PRN
Qty: 18 G | Refills: 5 | Status: SHIPPED | OUTPATIENT
Start: 2022-06-20

## 2022-06-20 NOTE — PROGRESS NOTES
Assessment/Plan:  Anticipatory guidance provided  Recommend follow-up with urology as scheduled  We will put him on inhalers to be used as needed for reactive airway symptoms  Consider chest x-ray if needed  1  Well adult exam    2  Benign essential tremor    3  Prostate CA ([facility] )    4  Hyperlipidemia, unspecified hyperlipidemia type  -     rosuvastatin (CRESTOR) 5 mg tablet; Take 1 tablet (5 mg total) by mouth every other day    5  Myelopathy ([facility] )    6  [facility]ra[facility]cardia[facility] ([facility] )    7  Mild intermittent reactive airway disease with acute exacerbation  -     fluticasone (Flovent HFA) 110 MCG/ACT inhaler; Inhale 2 puffs 2 (two) times a day Rinse mouth after use  -     albuterol (Ventolin HFA) 90 mcg/act inhaler; Inhale 2 puffs every 6 (six) hours as needed for wheezing  -     montelukast (SINGULAIR) 10 mg tablet; Take 1 tablet (10 mg total) by mouth daily at bedtime          Subjective:      Patient ID: Bryce Soriano is a 57 y o  male  Patient is seen for 1st time visit for checkup and for recheck on chronic conditions  He was recently diagnosed with prostate cancer  He had biopsy done and is due to follow up with Urology for results and is aware of prostate cancer diagnosis  He was placed on Advair by prior physician but this was not covered by insurance  BMI Counseling: Body mass index is 35 97 kg/m²  The BMI is above normal  Nutrition recommendations include decreasing portion sizes  Exercise recommendations include moderate physical activity 150 minutes/week  Rationale for BMI follow-up plan is due to patient being overweight or obese  Depression Screening and Follow-up Plan: Patient was screened for depression during today's encounter  They screened negative with a PHQ-2 score of 0          The following portions of the patient's history were reviewed and updated as appropriate: allergies, current medications, past family history, past medical history, past social history, past surgical history, and problem list     Review of Systems   Constitutional: Negative  HENT: Negative  Eyes: Negative  Respiratory: Negative  Cardiovascular: Negative  Gastrointestinal: Negative  Endocrine: Negative  Genitourinary: Negative  Musculoskeletal: Negative  Skin: Negative  Allergic/Immunologic: Negative  Neurological: Negative  Hematological: Negative  Psychiatric/Behavioral: Negative  Objective:      /82 (BP Location: Left arm, Patient Position: Sitting, Cuff Size: Standard)   Pulse 80   Temp 98 1 °F (36 7 °C) (Temporal)   Resp 14   Ht 5' 9" (1 753 m)   Wt 110 kg (243 lb 9 6 oz)   SpO2 96%   BMI 35 97 kg/m²          Physical Exam  Vitals reviewed  Constitutional:       Appearance: He is well-developed  HENT:      Head: Normocephalic and atraumatic  Right Ear: External ear normal  Tympanic membrane is not erythematous or bulging  Left Ear: External ear normal  Tympanic membrane is not erythematous or bulging  Nose: Nose normal       Mouth/Throat:      Mouth: No oral lesions  Pharynx: No oropharyngeal exudate  Eyes:      General: No scleral icterus  Right eye: No discharge  Left eye: No discharge  Conjunctiva/sclera: Conjunctivae normal    Neck:      Thyroid: No thyromegaly  Cardiovascular:      Rate and Rhythm: Normal rate and regular rhythm  Heart sounds: Normal heart sounds  No murmur heard  No friction rub  No gallop  Pulmonary:      Effort: Pulmonary effort is normal  No respiratory distress  Breath sounds: No wheezing or rales  Chest:      Chest wall: No tenderness  Abdominal:      General: Bowel sounds are normal  There is no distension  Palpations: Abdomen is soft  There is no mass  Tenderness: There is no abdominal tenderness  There is no guarding or rebound  Musculoskeletal:         General: No tenderness or deformity  Normal range of motion        Cervical back: Normal range of motion and neck supple  Lymphadenopathy:      Cervical: No cervical adenopathy  Skin:     General: Skin is warm and dry  Coloration: Skin is not pale  Findings: No erythema or rash  Neurological:      Mental Status: He is alert and oriented to person, place, and time  Cranial Nerves: No cranial nerve deficit  Motor: No abnormal muscle tone  Coordination: Coordination normal       Deep Tendon Reflexes: Reflexes are normal and symmetric     Psychiatric:         Behavior: Behavior normal

## 2022-06-21 ENCOUNTER — OFFICE VISIT (OUTPATIENT)
Dept: UROLOGY | Facility: CLINIC | Age: 62
End: 2022-06-21
Payer: COMMERCIAL

## 2022-06-21 VITALS
SYSTOLIC BLOOD PRESSURE: 106 MMHG | WEIGHT: 243 LBS | BODY MASS INDEX: 35.88 KG/M2 | DIASTOLIC BLOOD PRESSURE: 76 MMHG | HEART RATE: 86 BPM | OXYGEN SATURATION: 97 %

## 2022-06-21 DIAGNOSIS — C61 PROSTATE CANCER (HCC): Primary | ICD-10-CM

## 2022-06-21 PROCEDURE — 1036F TOBACCO NON-USER: CPT | Performed by: UROLOGY

## 2022-06-21 PROCEDURE — 99215 OFFICE O/P EST HI 40 MIN: CPT | Performed by: UROLOGY

## 2022-06-21 RX ORDER — CEFAZOLIN SODIUM 2 G/50ML
2000 SOLUTION INTRAVENOUS ONCE
OUTPATIENT
Start: 2022-08-11 | End: 2022-06-21

## 2022-06-21 NOTE — PROGRESS NOTES
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  Now s/p prostate biopsy  Still having some hematospermia  JORGE 13      Past Medical History:     Past Medical History:   Diagnosis Date    HL (hearing loss)     B/L    Hyperlipidemia     Hypertension     Obesity        PAST SURGICAL HISTORY:     Past Surgical History:   Procedure Laterality Date    WA BIOPSY OF PROSTATE,NEEDLE,TRANSPERINEAL N/A 6/8/2022    Procedure: TRANSPERINEAL MRI FUSION BIOPSY PROSTATE;  Surgeon: Amador Mehta MD;  Location: [facility] MAIN OR;  Service: Urology    VARICOSE VEIN SURGERY      VASECTOMY         CURRENT MEDICATIONS:     Current Outpatient Medications   Medication Sig Dispense Refill    albuterol (Ventolin HFA) 90 mcg/act inhaler Inhale 2 puffs every 6 (six) hours as needed for wheezing 18 g 5    aspirin (ECOTRIN LOW STRENGTH) 81 mg EC tablet Take 81 mg by mouth daily      fluticasone (Flovent HFA) 110 MCG/ACT inhaler Inhale 2 puffs 2 (two) times a day Rinse mouth after use  12 g 0    irbesartan (AVAPRO) 150 mg tablet       irbesartan (AVAPRO) 75 mg tablet       montelukast (SINGULAIR) 10 mg tablet Take 1 tablet (10 mg total) by mouth daily at bedtime 30 tablet 5    rosuvastatin (CRESTOR) 5 mg tablet Take 1 tablet (5 mg total) by mouth every other day 90 tablet 3     No current facility-administered medications for this visit         ALLERGIES:   No Known Allergies    SOCIAL HISTORY:     Social History     Socioeconomic History    Marital status: /Civil Union     Spouse name: None    Number of children: None    Years of education: None    Highest education level: None   Occupational History    None   Tobacco Use    Smoking status: Never Smoker    Smokeless tobacco: Never Used   Vaping Use    Vaping Use: Never used   Substance and Sexual Activity    Alcohol use: Yes     Comment: 1 x month    Drug use: Never    Sexual activity: Yes     Partners: Female   Other Topics Concern    None   Social History Narrative    None     Social Determinants of Health     Financial Resource Strain: Not on file   Food Insecurity: Not on file   Transportation Needs: Not on file   Physical Activity: Not on file   Stress: Not on file   Social Connections: Not on file   Intimate Partner Violence: Not on file   Housing Stability: Not on file       SOCIAL HISTORY:     Family History   Problem Relation Age of Onset    Sickle cell trait Mother     Hypertension Father     Cardiomyopathy Brother     Sickle cell trait Maternal Grandmother     Sickle cell trait Maternal Grandfather     Hypertension Paternal Grandfather        REVIEW OF SYSTEMS:     Review of Systems   Constitutional: Negative  Respiratory: Negative  Cardiovascular: Negative  Gastrointestinal: Negative  Genitourinary: Positive for scrotal swelling  Musculoskeletal: Negative  Skin: Negative  Psychiatric/Behavioral: Negative  PHYSICAL EXAM:     /76 (BP Location: Left arm, Patient Position: Sitting, Cuff Size: Adult)   Pulse 86   Wt 110 kg (243 lb)   SpO2 97%   BMI 35 88 kg/m²     General:  Healthy appearing male in no acute distress  They have a normal affect  There is not appear to be any gross neurologic defects or abnormalities  HEENT:  Normocephalic, atraumatic  Neck is supple without any palpable lymphadenopathy  Cardiovascular:  Patient has normal palpable distal radial pulses  There is no significant peripheral edema   No JVD is noted   Respiratory:  Patient has unlabored respirations  There is no audible wheeze or rhonchi  Abdomen:  Abdomen is without surgical scars  Abdomen is soft and nontender  There is no tympany  Inguinal and umbilical hernia are not appreciated  Genitourinary: no penile lesions or discharge, no testicular masses or tenderness, grade 2 varicocele on the right, no hernias, small 0 5 cm nodule on the right apex of the prostate    Musculoskeletal:  Patient does not have significant CVA tenderness in the  flank with palpation or percussion  They full range of motion in all 4 extremities  Strength in all 4 extremities appears congruent  Patient is able to ambulate without assistance or difficulty  Dermatologic:  Patient has no skin abnormalities or rashes  LABS:     CBC:   Lab Results   Component Value Date    WBC 6 18 2022    HGB 16 0 2022    HCT 48 6 2022    MCV 88 2022     2022       BMP:   Lab Results   Component Value Date    CALCIUM 9 3 2022    K 4 6 2022    CO2 28 2022     2022    BUN 22 2022    CREATININE 1 14 2022         IMAGIN/13/22  MULTIPARAMETRIC MRI OF THE PROSTATE WITH AND WITHOUT CONTRAST-WITH 3-D POSTPROCESSING      INDICATION:   R97 20: Elevated prostate specific antigen (PSA)  N40 3: Nodular prostate with lower urinary tract symptoms  N13 8: Other obstructive and reflux uropathy      COMPARISON: None     PSA LEVEL: 2 6 ng/ml  2022  PRIOR BIOPSY DATE: Unknown  BIOPSY RESULTS: Unknown      TECHNIQUE: The following pulse sequences were obtained:  Small field-of-view axial T1-weighted and multiplanar T2-weighted images; DWI axial and ADC map; large field of view axial T2 weighted images; T1w in-phase and opposed-phase axials of entire pelvis   and dynamic 3D T1w axial before and during IV contrast injection    Imaging performed on 3 0T MRI      CONTRAST:  Gadobutrol (Gadavist) 9 mL of Gadobutrol injection (SINGLE-DOSE)     TECHNICAL LIMITATIONS: None      FINDINGS:     PROSTATE:     Size: 5 1 x 4 4 x 4 6 cm = 49 7 cc  Post-biopsy hemorrhage:  None  Central gland enlargement (BPH): Mild      Focal lesions -      Lesion: 1       Size: 1 2 x 0 8 x 0 9 cm, 0 49 cc  Location: Right posterior medial peripheral zone at the apex       T2-weighted images: Score 3: Heterogeneous signal or noncircumscribed, rounded, moderate hypointensity; includes others that do not qualify as 2, 4 or 5  Diffusion-weighted images: Score 4: Focal markedly hypointense on ADC and markedly hyperintense on high b-value DWI; less than 1 5 cm in greatest dimension  Dynamic post-contrast images: (-) Lesion that does not enhance early compared to the surrounding prostate or enhances diffusely so that the margins of the enhancing area do not correspond to a finding on T2-weighted images and/or DWI  PI-RADS Assessment Category: 4, High (clinically significant cancer is likely to be present)  Extra-prostatic extension (EPE): Abuts capsule without visualized EPE      SEMINAL VESICLES: Unremarkable     Note: Clinically significant cancer is defined on pathology/histology as Pamela score greater than or equal to 7, and/or volume of greater than or equal to 0 5 mL, and/or extraprostatic extension      URINARY BLADDER: Unremarkable      LYMPH NODES: No pelvic lymphadenopathy      BONES: No suspicious osseous lesion      There are bilateral fat-containing inguinal hernias  There is diverticulosis coli      IMPRESSION:     1  PI-RADSv2 1 Category 4 -High (clinically significant cancer is likely to be present)  Right posterior medial peripheral zone at the apex      2  No extraprostatic tumor, seminal vesicle invasion, pelvic lymphadenopathy, or pelvic osseous metastatic disease      3  Calculated prostate volume of 49 7 cc  PATHOLOGY:     6/8/22  Final Diagnosis   A   Prostate, RIGHT ANTERIOR MEDIAL needle biopsy: -Benign prostatic tissue          B  Prostate, RIGHT BASE, needle Biopsy:   - Prostatic adenocarcinoma, confirmed by triple stain   -Pamela grade 3 +4 = score  7  -Percentage of Grade 4:  10%  -Tumor involves two submitted cores  -Tumor  discontinuously involves approximately 10% , 80% of  each core biopsy  - Perineural invasion: Not identified        C  Prostate, RIGHT POSTERIOR MEDIAL, needle biopsy:    -Benign prostatic tissue          D  Prostate, LEFT BASE, needle biopsy:    -Prostate tissue with small focus of atypical glands          E  Prostate, LEFT POSTERIOR MEDIAL, needle biopsy:    -Benign prostatic tissue  Note  Triple stain shows positive staining of myoepithelial cell layer by , P63  Racemase stain is negative  Controls reacted appropriately      F  Prostate, LEFT POSTERIOR LATERAL, needle biopsy:    -Prostate tissue with small focus of atypical glands  Note  Triple stain shows focal absent staining of myoepithelial cell layer by , P63  Racemase stain is negative  Controls reacted appropriately      G  Prostate, LEFT ANTERIOR LATERAL, needle biopsy:    -Benign prostatic tissue          H  Prostate, LEFT ANTERIOR MEDIAL,  needle biopsy:    -Benign prostatic tissue          I  Prostate, RIGHT POSTERIOR LATERAL, needle Biopsy:   - Prostatic adenocarcinoma, confirmed by triple stain   -Bronson grade 3 + 3 = score  6  -Tumor involves one of two submitted cores  -Tumor involves approximately 10% of  core biopsy  - Perineural invasion: Not identified        J  Prostate, RIGHT ANTERIOR LATERAL,  needle Biopsy:   - Prostatic adenocarcinoma, confirmed by triple stain   -Pamela grade 3 +4 = score  7  -Percentage of Grade 4:  10%  -Tumor involves two submitted cores  -Tumor involves approximately 20% , 20% of  each core biopsy  - Perineural invasion: Not identified        K   Prostate, Region of Interest,  needle Biopsy:   - Prostatic adenocarcinoma, confirmed by triple stain   -Bronson grade 3 +3 = score  6  -Tumor involves four of five submitted cores  -Tumor involves approximately 35% , 50%, 60%, 75%  of  each core biopsy  - Perineural invasion: Not identified     NOMOGRAM:         ASSESSMENT:     57 y o  male with 4/11 cores positive Pansey 3+4=7 adenocarcinoma prostate    PLAN:     I reviewed in depth the patient's pathology with both he and his wife  Patient was provided with a copy of his pathology and information regarding localized prostate cancer from the American [employer]  We reviewed the volume of cancer and the grade group being in Pansey score  We discussed his risk profile being favorable intermediate risk disease  Although his MRI indicates that his tumor abuts the capsule towards the right lateral apex, there is no extracapsular extension noted at present  We discussed the role of staging imaging based on guidelines  At this point time, the patient does not require any additional imaging  We discussed at options for treatment  We reviewed active surveillance based protocol  Although typically I avoid active surveillance in patients with intermediate risk disease, in the era of MRI imaging and genomic testing, this is available to patients who are motivated  At this point time, the patient would not be willing to consider active surveillance based protocol  We also briefly discussed thermal therapies for focal treatment  We discussed cryoablation and high-frequency ultrasound  Again in the area of MRI imaging and more localized diagnosis, focal treatments will likely continue to be studied and potentially have a role in the future  We discussed that at current, these are not guideline approved for primary treatment for prostate cancer    Should the patient be interested in a referral to a center currently investigating focal therapy, would be more than happy to make this referral   Patient does understand that his disease was not completely localized to his MRI lesion  We then discussed the patient's nomogram and statistical likelihood of disease treatment  We discussed the standard of care therapies of both surgery and radiation  At current, the patient is not interested in a referral to Radiation Oncology  The primary focus of our visit was discussion of a robotic assisted laparoscopic radical prostatectomy  We discussed his probability of lymph node involvement being less than 5%  We discussed the benefit of avoidance of lymph node dissection with regard to decreased risk of vascular injury, DVT and PE  We discussed how the surgery is performed  As an anesthesia provider, the patient is well-versed and anesthesia and its complications  We discussed surgical complications of the procedure including bleeding, infection damage to surrounding structures  Primarily we spent time discussing the urologic risks including injury to the bladder or ureters, urinary anastomotic leak or stenosis of the urethra or bladder neck contractures  We discussed the postop recovery both in-hospital and perioperatively  We discussed the need for prolonged catheterization to allow for healing  We discussed the likelihood of stress urinary incontinence which all patients will experience when the catheters 1st removed  We discussed recovery of incontinence and improvement in symptoms which can be assisted by preoperative pelvic floor physical therapy and aggressive Kegel exercises  We discussed distally that less than 5% of men will have bothersome incontinence after 1 year of recovery  We discussed treatment options for men who do have persistent long-term stress incontinence  Patient has poor libido and erectile dysfunction at baseline  Tyra score preoperatively was 13  Erections are not primarily concerning to him  We discussed the role of nerve-sparing prostatectomy to help with preservation of erectile function postoperatively    Given the patient's favorable intermediate risk disease on the right but the imaging findings of disease abutting the prostate capsule, with very thoughtful and careful about any potential for right-sided nerve sparing  We discussed at least unilateral left-sided nerve-sparing  Even aggressive nerve-sparing situations, the patient is likely to expect some deterioration of his already dysfunctional erections  We discussed options for treatment in the postoperative setting should the patient experience sexual decline  Lastly, I offered for the patient to seek out 2nd opinions or referral to Radiation Oncology  He did let me know that he will be seeing the [address] urology team   I reassured him this is an excellent and recognized KEN Zhang, Raciel  He is certainly in good hands at this institution should he opt to have his care there  We would be happy to assist or provide any information to the team necessary  If the patient is interested in continuing his care here at [facility] ER, I would be more than happy to assist him in any way or with scheduling  He will contact me and let me know final decision making for treatment after his upcoming opinion

## 2022-06-22 NOTE — TELEPHONE ENCOUNTER
Patient wife called for  trying to get the process started for her  prostate surgery      She can be reached at [phone]

## 2022-06-23 ENCOUNTER — TELEPHONE (OUTPATIENT)
Dept: UROLOGY | Facility: CLINIC | Age: 62
End: 2022-06-23

## 2022-06-23 NOTE — TELEPHONE ENCOUNTER
Called patient wife to schedule surgery  I let her know that this is a tentative date I am hoping to schedule for pending my manager approval for 08/11 at [facility], if this does not work I will be in contact with her to reschedule  Called and spoke with patient to schedule sx  Patient informed of all PAT's needed prior to sx and advised to stop any anticoagulant medication including Multi-vitamins, Fish oil, Krill oil and NSAID, Patient has also been inform that the hospital will call the day before sx with arrival time and procedure time   Patient also informed to have a  bring them to and from hospital     Sx Date: 8/11  Location: [facility]  Physician: Claudene Fiscal  H&P Date:    Clearance Date: 8/2  Consent: on admit  Pre-cert Added to  list on : 06/23 [facility]    PAT's Ordered to be done on: cbc, cmp, inr, ptt, t&s, ucx, ekg 07/28    Covid: 8/6 care now    Post op: 8/29

## 2022-06-29 NOTE — PROGRESS NOTES
Called and spoke with patient to schedule sx  Patient informed of all PAT's needed prior to sx and advised to stop any anticoagulant medication including Multi-vitamins, Fish oil, Krill oil and NSAID, Patient has also been inform that the hospital will call the day before sx with arrival time and procedure time   Patient also informed to have a  bring them to and from hospital      Sx Date: 8/11  Location: [facility]  Physician: Maurice Velez  H&P Date:    Clearance Date: 8/2  Consent: on admit  Pre-cert Added to  list on : 06/23 [facility]     PAT's Ordered to be done on: cbc, cmp, inr, ptt, t&s, ucx, ekg 07/28     Covid: 8/6 care now     Post op: 8/29

## 2022-06-30 DIAGNOSIS — I10 HYPERTENSION, UNSPECIFIED TYPE: Primary | ICD-10-CM

## 2022-06-30 RX ORDER — IRBESARTAN 75 MG/1
75 TABLET ORAL DAILY
Qty: 90 TABLET | Refills: 3 | Status: SHIPPED | OUTPATIENT
Start: 2022-06-30

## 2022-06-30 RX ORDER — IRBESARTAN 150 MG/1
150 TABLET ORAL DAILY
Qty: 90 TABLET | Refills: 3 | Status: SHIPPED | OUTPATIENT
Start: 2022-06-30

## 2022-06-30 NOTE — TELEPHONE ENCOUNTER
Pt LM on refill line requesting refill on:  irbesartan (AVAPRO) 75 mg tablet   irbesartan (AVAPRO) 150 mg tablet     To be sent to [address]

## 2022-07-07 NOTE — PROGRESS NOTES
PT Evaluation     Today's date: 2022  Patient name: June Mcdonald  : 1960  MRN: [id number]  Referring provider: Danica King, *  Dx:   Encounter Diagnosis     ICD-10-CM    1  Pelvic floor weakness, male  M62 89    2  Unspecified lack of coordination  R27 9        Start Time: 730  Stop Time: 0830  Total time in clinic (min): 60 minutes    Assessment  Assessment details: Simon James is a 57y o  year old male who presents to OPPT for pre-operative protstatctomy  The following impairments were found on evaluation: mild decreased strength and coordination of PFM  Simon James  is a good candidate for therapy and would benefit from skilled physical therapy to address the above impairments in order to allow him success post operatively  Patient education provided on PFM anatomy and function, toilet posture, toilet mechanics  and HEP for PFM strengthening and coordination  Patient educated on diagnosis, plan of care and prognosis  Barbara Craft are in agreement with recommended plan of care, goals for therapy and demonstrates motivation for active participation in proposed plan of care  Thank you Dr Katerin Resendez for this referral!    Impairments: abnormal coordination and impaired physical strength  Barriers to therapy: None   Understanding of Dx/Px/POC: good   Prognosis: good    Goals  STG to be completed in 4 weeks from 2022 :  1  Simon James will be independent with initial home exercise program    2  Simon James will demonstrate ability to contract, relax and actively lengthen PFM  3  Simon James will demonstrate ability to appropriately activate deep core muscles with functional mobility tasks           Plan  Patient would benefit from: skilled physical therapy  Planned modality interventions: biofeedback, cryotherapy, thermotherapy: hydrocollator packs and ultrasound  Planned therapy interventions: abdominal trunk stabilization, activity modification, ADL retraining, balance, balance/weight bearing training, behavior modification, body mechanics training, breathing training, coordination, flexibility, functional ROM exercises, gait training, graded activity, graded exercise, graded motor, joint mobilization, manual therapy, massage, motor coordination training, neuromuscular re-education, patient education, postural training, strengthening, stretching, therapeutic activities, therapeutic exercise, therapeutic training and transfer training  Frequency: 1x month  Duration in weeks: 4  Plan of Care beginning date: 7/8/2022  Plan of Care expiration date: 8/5/2022  Treatment plan discussed with: PTA and referring physician        PT Pelvic Floor Subjective:   History of Present Illness:   Miranda Lee is a 57 y o  male who presents to OPPT for pre op prostatectomy  They are referred to OPPT by Dr Carrillo Fraction urology  [facility] reports he is scheduled for prostatectomy 8/11/2022  He denies any current urinary symptoms  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  He reports PCP does rectal exam and found firmness  Had MRI and found tumor  Had biopsy and it was cancerous        Social Support:     Lives in:  Multiple-level home (no issues with stairs )    Lives with:  Spouse    Relationship status: /committed    Work status: employed full time ([employer] )    Life stress severity: moderate (work related management)  Diet and Exercise:    Diet:balanced nutrition    Exercise type: no activity and walking    Going to gym increased stress   Walking 1 mile a day   Has a peloton that he was using regularly     Not exercising due to: lack of time  Co-morbidities:    GERD, HTN, SVT, tremor, prostate CA  Bladder Function:     Voiding Difficulties positive for: incomplete emptying (comfortable walking away but then 20 min later feels he has to go and will urinate more )      Voiding Difficulties negative for: urgency, frequent urination, hesitancy and straining      Voiding Difficulties comments:     Voiding frequency: every 3-4 hours Urinary leakage: no urine leakage    Urinary leakage aggravated by: post-void dribble (increased in last year )    Urinary leakage not aggravated by: hearing running water, key-in-the-door syndrome and seeing a toilet    Nocturia (episodes per night): 1 and 0    Intake (ounces): Intake (ounces) comment: Diet soda 3 cans a day   Cranberry juice in the AM 8oz   Water- 2 16oz bottles (now drinking mostly water)  Alcohol casually     Bowel Function:     Bowel frequency: daily and multiple times a day    Equality Stool Scale: type 3 and type 4  Sexual Function:     Sexually Active:  Sexually active (no issues achieving an erection, some difficulty maintaining, able to achieve orgasm, able to ejaculate )    Pain during intercourse: No    Pain:     No pain reported by patient  Treatments:     None    Patient Goals: Other patient goals:  "learn how to do kegals and learn what to do after surgery"       Objective   Pelvic Floor Exam   Position: side-lying    Skin inspection:   no scars present  General Perineum Exam:   perineum intact  Visual Inspection of Perineum:   Excursion of perineal body in cephalad direction with contraction of pelvic floor muscles (PFM): good  Excursion of perineal body in caudal direction with relaxation of pelvic floor muscles (PFM): fair   Cough reflex: cough reflex (weak)  Sphincter Tone Resting: normal  Sphincter Tone Squeeze: normal  Tenderness: unprovoked    Pelvic Floor Muscle Exam     Muscle Contraction: well isolated  Breathing pattern with contraction: holding breath  Pelvic floor muscle relaxation is complete  PERFECT Score   Power right: 3+/5  Power left: 3+/5  Endurance (seconds to max): 5  Repetitions (before fatigue): 3  Fast flicks (in 10 seconds): 10               Precautions: standard    Access Code: [email or web address]  URL: [email or web address]  Date: 07/08/2022  Prepared by: Shala Hearing    Exercises  Diaphragmatic Breathing - 1 x daily  Bridge with Pelvic Floor Contraction - 1 x daily - 1 sets - 10-20 reps  Sit to Stand with Pelvic Floor Contraction - 1 x daily - 1 sets - 10 reps  Seated Pelvic Floor Contraction - 1 x daily - 3 sets - 10 reps       7/8/2022          Patient Ed           PFM anatomy and function KH          Healthy bladder habits KH          Timed voiding intervals            Post op expectations for UI [address]          AMANDA KH                                Neuro Re-Ed                                                                                        Ther Ex           DB with PFM contraction           Bridge with PFM           STS with PFM            Seated PFM contraction                                                        Ther Activity                                 Manual                                 Modalities

## 2022-07-07 NOTE — TELEPHONE ENCOUNTER
Pt wife called and left VM in regards to pt needing to sign consent form for upcoming surgery with Dr Jong Tompkins on 8/11/22    Pt call [facility]-[phone]

## 2022-07-08 ENCOUNTER — EVALUATION (OUTPATIENT)
Dept: PHYSICAL THERAPY | Facility: REHABILITATION | Age: 62
End: 2022-07-08
Payer: COMMERCIAL

## 2022-07-08 DIAGNOSIS — M62.89 PELVIC FLOOR WEAKNESS, MALE: Primary | ICD-10-CM

## 2022-07-08 DIAGNOSIS — R27.9 UNSPECIFIED LACK OF COORDINATION: ICD-10-CM

## 2022-07-08 DIAGNOSIS — C61 PROSTATE CANCER (HCC): ICD-10-CM

## 2022-07-08 PROCEDURE — 97162 PT EVAL MOD COMPLEX 30 MIN: CPT

## 2022-07-08 PROCEDURE — 97530 THERAPEUTIC ACTIVITIES: CPT

## 2022-07-08 NOTE — TELEPHONE ENCOUNTER
Returned call to Alessandro Suarez and informed her consent will be signed the day of surgery  She understood and agreed

## 2022-07-25 ENCOUNTER — TELEPHONE (OUTPATIENT)
Dept: UROLOGY | Facility: MEDICAL CENTER | Age: 62
End: 2022-07-25

## 2022-07-25 ENCOUNTER — PREP FOR PROCEDURE (OUTPATIENT)
Dept: UROLOGY | Facility: CLINIC | Age: 62
End: 2022-07-25

## 2022-07-25 DIAGNOSIS — C61 PROSTATE CANCER (HCC): Primary | ICD-10-CM

## 2022-07-27 ENCOUNTER — TELEPHONE (OUTPATIENT)
Dept: UROLOGY | Facility: MEDICAL CENTER | Age: 62
End: 2022-07-27

## 2022-07-27 ENCOUNTER — TELEPHONE (OUTPATIENT)
Dept: UROLOGY | Facility: AMBULATORY SURGERY CENTER | Age: 62
End: 2022-07-27

## 2022-07-27 NOTE — TELEPHONE ENCOUNTER
Patient's wife called and that patient has questions about surgery that he is having done on 08/11/22       Patient wife would really like to either have an appointment or a telephone call back from Dr Xuan Olsen      Patient's wife can be reached at [phone]

## 2022-07-28 NOTE — TELEPHONE ENCOUNTER
Called Willy Silverio back to let her know Dr Jaqueline Martino can call her  today but not tomorrow  Willy Silverio is asking for a phone call around 430 today  Discussed with Willy Silverio will send message to Dr Jaqueline Martino

## 2022-07-28 NOTE — TELEPHONE ENCOUNTER
Called the patient and spoke with his wife Baldemar Davis states her  is a "mess" now that the surgery date is approaching and has questions  Patient is a CRNA at [employer]  Offered to schedule an appointment but Baldemar Davis is asking about a phone call from Dr Aaliyah Cunningham MD is out of the office today but I could try and get a message to him and then call her back

## 2022-07-28 NOTE — TELEPHONE ENCOUNTER
Called Scott Joseph back to let her know Dr Rosas Yanez can call her  today but not tomorrow  Scott Joseph is asking for a phone call around 430 today  Discussed with Scott Joseph will send message to Dr Rosas Yanez

## 2022-07-29 ENCOUNTER — OFFICE VISIT (OUTPATIENT)
Dept: LAB | Facility: HOSPITAL | Age: 62
End: 2022-07-29
Payer: COMMERCIAL

## 2022-07-29 ENCOUNTER — APPOINTMENT (OUTPATIENT)
Dept: LAB | Facility: HOSPITAL | Age: 62
End: 2022-07-29
Payer: COMMERCIAL

## 2022-07-29 DIAGNOSIS — C61 PROSTATE CANCER (HCC): ICD-10-CM

## 2022-07-29 LAB
ALBUMIN SERPL BCP-MCNC: 3.8 G/DL (ref 3.5–5)
ALP SERPL-CCNC: 92 U/L (ref 46–116)
ALT SERPL W P-5'-P-CCNC: 48 U/L (ref 12–78)
ANION GAP SERPL CALCULATED.3IONS-SCNC: 8 MMOL/L (ref 4–13)
APTT PPP: 26 SECONDS (ref 23–37)
AST SERPL W P-5'-P-CCNC: 21 U/L (ref 5–45)
BASOPHILS # BLD AUTO: 0.03 THOUSANDS/ΜL (ref 0–0.1)
BASOPHILS NFR BLD AUTO: 1 % (ref 0–1)
BILIRUB SERPL-MCNC: 0.62 MG/DL (ref 0.2–1)
BUN SERPL-MCNC: 17 MG/DL (ref 5–25)
CALCIUM SERPL-MCNC: 8.7 MG/DL (ref 8.3–10.1)
CHLORIDE SERPL-SCNC: 101 MMOL/L (ref 96–108)
CO2 SERPL-SCNC: 28 MMOL/L (ref 21–32)
CREAT SERPL-MCNC: 1.17 MG/DL (ref 0.6–1.3)
EOSINOPHIL # BLD AUTO: 0.14 THOUSAND/ΜL (ref 0–0.61)
EOSINOPHIL NFR BLD AUTO: 3 % (ref 0–6)
ERYTHROCYTE [DISTWIDTH] IN BLOOD BY AUTOMATED COUNT: 13.2 % (ref 11.6–15.1)
GFR SERPL CREATININE-BSD FRML MDRD: 66 ML/MIN/1.73SQ M
GLUCOSE P FAST SERPL-MCNC: 96 MG/DL (ref 65–99)
HCT VFR BLD AUTO: 46.6 % (ref 36.5–49.3)
HGB BLD-MCNC: 15.7 G/DL (ref 12–17)
IMM GRANULOCYTES # BLD AUTO: 0.03 THOUSAND/UL (ref 0–0.2)
IMM GRANULOCYTES NFR BLD AUTO: 1 % (ref 0–2)
INR PPP: 0.93 (ref 0.84–1.19)
LYMPHOCYTES # BLD AUTO: 1.08 THOUSANDS/ΜL (ref 0.6–4.47)
LYMPHOCYTES NFR BLD AUTO: 19 % (ref 14–44)
MCH RBC QN AUTO: 30.2 PG (ref 26.8–34.3)
MCHC RBC AUTO-ENTMCNC: 33.7 G/DL (ref 31.4–37.4)
MCV RBC AUTO: 90 FL (ref 82–98)
MONOCYTES # BLD AUTO: 0.55 THOUSAND/ΜL (ref 0.17–1.22)
MONOCYTES NFR BLD AUTO: 10 % (ref 4–12)
NEUTROPHILS # BLD AUTO: 3.83 THOUSANDS/ΜL (ref 1.85–7.62)
NEUTS SEG NFR BLD AUTO: 66 % (ref 43–75)
NRBC BLD AUTO-RTO: 0 /100 WBCS
PLATELET # BLD AUTO: 205 THOUSANDS/UL (ref 149–390)
PMV BLD AUTO: 10.1 FL (ref 8.9–12.7)
POTASSIUM SERPL-SCNC: 4.3 MMOL/L (ref 3.5–5.3)
PROT SERPL-MCNC: 7.6 G/DL (ref 6.4–8.4)
PROTHROMBIN TIME: 12.5 SECONDS (ref 11.6–14.5)
RBC # BLD AUTO: 5.2 MILLION/UL (ref 3.88–5.62)
SODIUM SERPL-SCNC: 137 MMOL/L (ref 135–147)
WBC # BLD AUTO: 5.66 THOUSAND/UL (ref 4.31–10.16)

## 2022-07-29 PROCEDURE — 86900 BLOOD TYPING SEROLOGIC ABO: CPT

## 2022-07-29 PROCEDURE — 86920 COMPATIBILITY TEST SPIN: CPT

## 2022-07-29 PROCEDURE — 93005 ELECTROCARDIOGRAM TRACING: CPT

## 2022-07-29 PROCEDURE — 80053 COMPREHEN METABOLIC PANEL: CPT

## 2022-07-29 PROCEDURE — 86901 BLOOD TYPING SEROLOGIC RH(D): CPT

## 2022-07-29 PROCEDURE — 85730 THROMBOPLASTIN TIME PARTIAL: CPT

## 2022-07-29 PROCEDURE — 85610 PROTHROMBIN TIME: CPT

## 2022-07-29 PROCEDURE — 86850 RBC ANTIBODY SCREEN: CPT

## 2022-07-29 PROCEDURE — 85025 COMPLETE CBC W/AUTO DIFF WBC: CPT

## 2022-07-29 PROCEDURE — 87086 URINE CULTURE/COLONY COUNT: CPT

## 2022-07-29 PROCEDURE — 36415 COLL VENOUS BLD VENIPUNCTURE: CPT

## 2022-07-30 LAB — BACTERIA UR CULT: NORMAL

## 2022-08-01 LAB
ATRIAL RATE: 65 BPM
P AXIS: 37 DEGREES
PR INTERVAL: 156 MS
QRS AXIS: -2 DEGREES
QRSD INTERVAL: 96 MS
QT INTERVAL: 406 MS
QTC INTERVAL: 422 MS
T WAVE AXIS: 22 DEGREES
VENTRICULAR RATE: 65 BPM

## 2022-08-01 PROCEDURE — 93010 ELECTROCARDIOGRAM REPORT: CPT | Performed by: INTERNAL MEDICINE

## 2022-08-02 ENCOUNTER — CONSULT (OUTPATIENT)
Dept: FAMILY MEDICINE CLINIC | Facility: CLINIC | Age: 62
End: 2022-08-02
Payer: COMMERCIAL

## 2022-08-02 VITALS
WEIGHT: 220.6 LBS | SYSTOLIC BLOOD PRESSURE: 128 MMHG | TEMPERATURE: 98 F | HEART RATE: 83 BPM | OXYGEN SATURATION: 98 % | HEIGHT: 69 IN | DIASTOLIC BLOOD PRESSURE: 72 MMHG | BODY MASS INDEX: 32.67 KG/M2

## 2022-08-02 DIAGNOSIS — Z12.11 SCREENING FOR COLORECTAL CANCER: ICD-10-CM

## 2022-08-02 DIAGNOSIS — Z11.59 NEED FOR HEPATITIS C SCREENING TEST: ICD-10-CM

## 2022-08-02 DIAGNOSIS — Z11.4 SCREENING FOR HIV (HUMAN IMMUNODEFICIENCY VIRUS): ICD-10-CM

## 2022-08-02 DIAGNOSIS — C61 PROSTATE CA (HCC): ICD-10-CM

## 2022-08-02 DIAGNOSIS — Z12.12 SCREENING FOR COLORECTAL CANCER: ICD-10-CM

## 2022-08-02 DIAGNOSIS — Z01.818 PRE-OP EXAMINATION: Primary | ICD-10-CM

## 2022-08-02 PROCEDURE — 99243 OFF/OP CNSLTJ NEW/EST LOW 30: CPT | Performed by: FAMILY MEDICINE

## 2022-08-02 NOTE — PROGRESS NOTES
Assessment/Plan:  Preoperative testing including blood testing in EKG was reviewed  Patient is cleared for upcoming prostatectomy later this month  He will call with any concerns in the interim  He will plan on taking his antihypertensive medication the day of the procedure but he will be holding his aspirin over the next week  1  Pre-op examination    2  Prostate CA ([address] )    3  Need for hepatitis C screening test    4  Screening for HIV (human immunodeficiency virus)    5  Screening for colorectal cancer          Subjective:      Patient ID: Leila Parker is a 57 y o  male  Patient with history of prostate cancer is here today for preoperative clearance for upcoming prostatectomy to be done on 08/01/2022 at [place] [id number]  Patient has no prior difficulty with anesthesia no bleeding abnormalities  He has stopped his aspirin today in preparation for surgery next week  He does have history of controlled hypertension  He is generally feeling well  No significant urinary complaints at this time  The following portions of the patient's history were reviewed and updated as appropriate: allergies, current medications, past family history, past medical history, past social history, past surgical history, and problem list     Review of Systems   Constitutional: Negative  HENT: Negative  Eyes: Negative  Respiratory: Negative  Cardiovascular: Negative  Gastrointestinal: Negative  Endocrine: Negative  Genitourinary: Negative  Musculoskeletal: Negative  Skin: Negative  Allergic/Immunologic: Negative  Neurological: Negative  Hematological: Negative  Psychiatric/Behavioral: Negative            Objective:      /72 (BP Location: Left arm, Patient Position: Sitting, Cuff Size: Standard)   Pulse 83   Temp 98 °F (36 7 °C) (Temporal)   Ht 5' 9" (1 753 m)   Wt 100 kg (220 lb 9 6 oz)   SpO2 98%   BMI 32 58 kg/m²          Physical Exam  Vitals reviewed  Constitutional:       Appearance: He is well-developed  HENT:      Head: Normocephalic and atraumatic  Right Ear: External ear normal  Tympanic membrane is not erythematous or bulging  Left Ear: External ear normal  Tympanic membrane is not erythematous or bulging  Nose: Nose normal       Mouth/Throat:      Mouth: No oral lesions  Pharynx: No oropharyngeal exudate  Eyes:      General: No scleral icterus  Right eye: No discharge  Left eye: No discharge  Conjunctiva/sclera: Conjunctivae normal    Neck:      Thyroid: No thyromegaly  Cardiovascular:      Rate and Rhythm: Normal rate and regular rhythm  Heart sounds: Normal heart sounds  No murmur heard  No friction rub  No gallop  Pulmonary:      Effort: Pulmonary effort is normal  No respiratory distress  Breath sounds: No wheezing or rales  Chest:      Chest wall: No tenderness  Abdominal:      General: Bowel sounds are normal  There is no distension  Palpations: Abdomen is soft  There is no mass  Tenderness: There is no abdominal tenderness  There is no guarding or rebound  Musculoskeletal:         General: No tenderness or deformity  Normal range of motion  Cervical back: Normal range of motion and neck supple  Lymphadenopathy:      Cervical: No cervical adenopathy  Skin:     General: Skin is warm and dry  Coloration: Skin is not pale  Findings: No erythema or rash  Neurological:      Mental Status: He is alert and oriented to person, place, and time  Cranial Nerves: No cranial nerve deficit  Motor: No abnormal muscle tone  Coordination: Coordination normal       Deep Tendon Reflexes: Reflexes are normal and symmetric     Psychiatric:         Behavior: Behavior normal

## 2022-08-02 NOTE — H&P (VIEW-ONLY)
Assessment/Plan:  Preoperative testing including blood testing in EKG was reviewed  Patient is cleared for upcoming prostatectomy later this month  He will call with any concerns in the interim  He will plan on taking his antihypertensive medication the day of the procedure but he will be holding his aspirin over the next week  1  Pre-op examination    2  Prostate CA ([address] )    3  Need for hepatitis C screening test    4  Screening for HIV (human immunodeficiency virus)    5  Screening for colorectal cancer          Subjective:      Patient ID: Gino Carrington is a 57 y o  male  Patient with history of prostate cancer is here today for preoperative clearance for upcoming prostatectomy to be done on 08/01/2022 at [place] [address]  Patient has no prior difficulty with anesthesia no bleeding abnormalities  He has stopped his aspirin today in preparation for surgery next week  He does have history of controlled hypertension  He is generally feeling well  No significant urinary complaints at this time  The following portions of the patient's history were reviewed and updated as appropriate: allergies, current medications, past family history, past medical history, past social history, past surgical history, and problem list     Review of Systems   Constitutional: Negative  HENT: Negative  Eyes: Negative  Respiratory: Negative  Cardiovascular: Negative  Gastrointestinal: Negative  Endocrine: Negative  Genitourinary: Negative  Musculoskeletal: Negative  Skin: Negative  Allergic/Immunologic: Negative  Neurological: Negative  Hematological: Negative  Psychiatric/Behavioral: Negative            Objective:      /72 (BP Location: Left arm, Patient Position: Sitting, Cuff Size: Standard)   Pulse 83   Temp 98 °F (36 7 °C) (Temporal)   Ht 5' 9" (1 753 m)   Wt 100 kg (220 lb 9 6 oz)   SpO2 98%   BMI 32 58 kg/m²          Physical Exam  Vitals reviewed  Constitutional:       Appearance: He is well-developed  HENT:      Head: Normocephalic and atraumatic  Right Ear: External ear normal  Tympanic membrane is not erythematous or bulging  Left Ear: External ear normal  Tympanic membrane is not erythematous or bulging  Nose: Nose normal       Mouth/Throat:      Mouth: No oral lesions  Pharynx: No oropharyngeal exudate  Eyes:      General: No scleral icterus  Right eye: No discharge  Left eye: No discharge  Conjunctiva/sclera: Conjunctivae normal    Neck:      Thyroid: No thyromegaly  Cardiovascular:      Rate and Rhythm: Normal rate and regular rhythm  Heart sounds: Normal heart sounds  No murmur heard  No friction rub  No gallop  Pulmonary:      Effort: Pulmonary effort is normal  No respiratory distress  Breath sounds: No wheezing or rales  Chest:      Chest wall: No tenderness  Abdominal:      General: Bowel sounds are normal  There is no distension  Palpations: Abdomen is soft  There is no mass  Tenderness: There is no abdominal tenderness  There is no guarding or rebound  Musculoskeletal:         General: No tenderness or deformity  Normal range of motion  Cervical back: Normal range of motion and neck supple  Lymphadenopathy:      Cervical: No cervical adenopathy  Skin:     General: Skin is warm and dry  Coloration: Skin is not pale  Findings: No erythema or rash  Neurological:      Mental Status: He is alert and oriented to person, place, and time  Cranial Nerves: No cranial nerve deficit  Motor: No abnormal muscle tone  Coordination: Coordination normal       Deep Tendon Reflexes: Reflexes are normal and symmetric     Psychiatric:         Behavior: Behavior normal

## 2022-08-03 LAB
ABO GROUP BLD: NORMAL
BLD GP AB SCN SERPL QL: NEGATIVE
RH BLD: POSITIVE
SPECIMEN EXPIRATION DATE: NORMAL

## 2022-08-03 NOTE — PRE-PROCEDURE INSTRUCTIONS
Pre-Surgery Instructions:   Medication Instructions    albuterol (Ventolin HFA) 90 mcg/act inhaler Uses PRN- OK to take day of surgery    aspirin (ECOTRIN LOW STRENGTH) 81 mg EC tablet Stop taking 7 days prior to surgery   fluticasone (Flovent HFA) 110 MCG/ACT inhaler Uses PRN- OK to take day of surgery    irbesartan (AVAPRO) 150 mg tablet Hold day of surgery   irbesartan (AVAPRO) 75 mg tablet Hold day of surgery   montelukast (SINGULAIR) 10 mg tablet Take night before surgery    rosuvastatin (CRESTOR) 5 mg tablet Take day of surgery  Have you had / have a sore throat? No  Have you had / have a cough less than 1 week? No  Have you had / have a fever greater than 100 0 - 100  4? No  Are you experiencing any shortness of breath? No    Review with patient via phone medications and showering instructions  Instructed to avoid all ASA and OTC Vit/Supp 1 week prior to surgery and to avoid NSAIDs 3 days prior to surgery per anesthesia instructions  Tylenol ok to take prn  [facility] call with surgery schedule time, nothing eat or drink after midnight  Verbalized understanding  Advised AMB Referral to Sleep Medicine is not interested  Prostate class review

## 2022-08-06 DIAGNOSIS — C61 PROSTATE CANCER (HCC): ICD-10-CM

## 2022-08-06 LAB — SARS-COV-2 RNA RESP QL NAA+PROBE: NEGATIVE

## 2022-08-06 PROCEDURE — 87635 SARS-COV-2 COVID-19 AMP PRB: CPT | Performed by: UROLOGY

## 2022-08-09 ENCOUNTER — ANESTHESIA EVENT (OUTPATIENT)
Dept: PERIOP | Facility: HOSPITAL | Age: 62
End: 2022-08-09
Payer: COMMERCIAL

## 2022-08-11 ENCOUNTER — HOSPITAL ENCOUNTER (OUTPATIENT)
Facility: HOSPITAL | Age: 62
Setting detail: OUTPATIENT SURGERY
Discharge: HOME | End: 2022-08-13
Attending: UROLOGY | Admitting: UROLOGY
Payer: COMMERCIAL

## 2022-08-11 ENCOUNTER — ANESTHESIA (OUTPATIENT)
Dept: PERIOP | Facility: HOSPITAL | Age: 62
End: 2022-08-11
Payer: COMMERCIAL

## 2022-08-11 DIAGNOSIS — C61 PROSTATE CANCER (HCC): ICD-10-CM

## 2022-08-11 LAB
ABO GROUP BLD: NORMAL
ANION GAP SERPL CALCULATED.3IONS-SCNC: 10 MMOL/L (ref 4–13)
BUN SERPL-MCNC: 15 MG/DL (ref 5–25)
CALCIUM SERPL-MCNC: 8.3 MG/DL (ref 8.3–10.1)
CHLORIDE SERPL-SCNC: 106 MMOL/L (ref 96–108)
CO2 SERPL-SCNC: 24 MMOL/L (ref 21–32)
CREAT SERPL-MCNC: 1.49 MG/DL (ref 0.6–1.3)
ERYTHROCYTE [DISTWIDTH] IN BLOOD BY AUTOMATED COUNT: 13.2 % (ref 11.6–15.1)
GFR SERPL CREATININE-BSD FRML MDRD: 49 ML/MIN/1.73SQ M
GLUCOSE P FAST SERPL-MCNC: 134 MG/DL (ref 65–99)
GLUCOSE SERPL-MCNC: 134 MG/DL (ref 65–140)
HCT VFR BLD AUTO: 44.5 % (ref 36.5–49.3)
HGB BLD-MCNC: 14.4 G/DL (ref 12–17)
MCH RBC QN AUTO: 29.7 PG (ref 26.8–34.3)
MCHC RBC AUTO-ENTMCNC: 32.4 G/DL (ref 31.4–37.4)
MCV RBC AUTO: 92 FL (ref 82–98)
PLATELET # BLD AUTO: 191 THOUSANDS/UL (ref 149–390)
PMV BLD AUTO: 9.8 FL (ref 8.9–12.7)
POTASSIUM SERPL-SCNC: 5.1 MMOL/L (ref 3.5–5.3)
RBC # BLD AUTO: 4.85 MILLION/UL (ref 3.88–5.62)
RH BLD: POSITIVE
SODIUM SERPL-SCNC: 140 MMOL/L (ref 135–147)
WBC # BLD AUTO: 11.77 THOUSAND/UL (ref 4.31–10.16)

## 2022-08-11 PROCEDURE — 88309 TISSUE EXAM BY PATHOLOGIST: CPT | Performed by: SPECIALIST

## 2022-08-11 PROCEDURE — 88344 IMHCHEM/IMCYTCHM EA MLT ANTB: CPT | Performed by: SPECIALIST

## 2022-08-11 PROCEDURE — S2900 ROBOTIC SURGICAL SYSTEM: HCPCS | Performed by: UROLOGY

## 2022-08-11 PROCEDURE — 85027 COMPLETE CBC AUTOMATED: CPT | Performed by: UROLOGY

## 2022-08-11 PROCEDURE — 55866 LAPS SURG PRST8ECT RPBIC RAD: CPT | Performed by: PHYSICIAN ASSISTANT

## 2022-08-11 PROCEDURE — 88342 IMHCHEM/IMCYTCHM 1ST ANTB: CPT | Performed by: SPECIALIST

## 2022-08-11 PROCEDURE — 80048 BASIC METABOLIC PNL TOTAL CA: CPT | Performed by: UROLOGY

## 2022-08-11 PROCEDURE — 88307 TISSUE EXAM BY PATHOLOGIST: CPT | Performed by: SPECIALIST

## 2022-08-11 PROCEDURE — 55866 LAPS SURG PRST8ECT RPBIC RAD: CPT | Performed by: UROLOGY

## 2022-08-11 RX ORDER — MIDAZOLAM HYDROCHLORIDE 2 MG/2ML
INJECTION, SOLUTION INTRAMUSCULAR; INTRAVENOUS AS NEEDED
Status: DISCONTINUED | OUTPATIENT
Start: 2022-08-11 | End: 2022-08-11

## 2022-08-11 RX ORDER — PRAVASTATIN SODIUM 40 MG
40 TABLET ORAL
Refills: 3 | Status: DISCONTINUED | OUTPATIENT
Start: 2022-08-11 | End: 2022-08-13 | Stop reason: HOSPADM

## 2022-08-11 RX ORDER — GABAPENTIN 300 MG/1
300 CAPSULE ORAL
Status: DISCONTINUED | OUTPATIENT
Start: 2022-08-11 | End: 2022-08-13 | Stop reason: HOSPADM

## 2022-08-11 RX ORDER — SODIUM CHLORIDE, SODIUM LACTATE, POTASSIUM CHLORIDE, CALCIUM CHLORIDE 600; 310; 30; 20 MG/100ML; MG/100ML; MG/100ML; MG/100ML
150 INJECTION, SOLUTION INTRAVENOUS CONTINUOUS
Status: DISCONTINUED | OUTPATIENT
Start: 2022-08-11 | End: 2022-08-12

## 2022-08-11 RX ORDER — MONTELUKAST SODIUM 10 MG/1
10 TABLET ORAL
Status: DISCONTINUED | OUTPATIENT
Start: 2022-08-11 | End: 2022-08-13 | Stop reason: HOSPADM

## 2022-08-11 RX ORDER — ONDANSETRON 2 MG/ML
4 INJECTION INTRAMUSCULAR; INTRAVENOUS EVERY 6 HOURS PRN
Status: DISCONTINUED | OUTPATIENT
Start: 2022-08-11 | End: 2022-08-13 | Stop reason: HOSPADM

## 2022-08-11 RX ORDER — NEOSTIGMINE METHYLSULFATE 1 MG/ML
INJECTION INTRAVENOUS AS NEEDED
Status: DISCONTINUED | OUTPATIENT
Start: 2022-08-11 | End: 2022-08-11

## 2022-08-11 RX ORDER — BUPIVACAINE HYDROCHLORIDE 5 MG/ML
INJECTION, SOLUTION PERINEURAL AS NEEDED
Status: DISCONTINUED | OUTPATIENT
Start: 2022-08-11 | End: 2022-08-11 | Stop reason: HOSPADM

## 2022-08-11 RX ORDER — DEXMEDETOMIDINE HYDROCHLORIDE 100 UG/ML
INJECTION, SOLUTION INTRAVENOUS AS NEEDED
Status: DISCONTINUED | OUTPATIENT
Start: 2022-08-11 | End: 2022-08-11

## 2022-08-11 RX ORDER — LIDOCAINE HYDROCHLORIDE 20 MG/ML
INJECTION, SOLUTION EPIDURAL; INFILTRATION; INTRACAUDAL; PERINEURAL AS NEEDED
Status: DISCONTINUED | OUTPATIENT
Start: 2022-08-11 | End: 2022-08-11

## 2022-08-11 RX ORDER — MAGNESIUM HYDROXIDE 1200 MG/15ML
LIQUID ORAL AS NEEDED
Status: DISCONTINUED | OUTPATIENT
Start: 2022-08-11 | End: 2022-08-11 | Stop reason: HOSPADM

## 2022-08-11 RX ORDER — OXYBUTYNIN CHLORIDE 5 MG/1
5 TABLET, EXTENDED RELEASE ORAL DAILY
Status: DISCONTINUED | OUTPATIENT
Start: 2022-08-11 | End: 2022-08-12

## 2022-08-11 RX ORDER — SENNOSIDES 8.6 MG
1 TABLET ORAL DAILY
Status: DISCONTINUED | OUTPATIENT
Start: 2022-08-11 | End: 2022-08-13 | Stop reason: HOSPADM

## 2022-08-11 RX ORDER — SODIUM CHLORIDE, SODIUM LACTATE, POTASSIUM CHLORIDE, CALCIUM CHLORIDE 600; 310; 30; 20 MG/100ML; MG/100ML; MG/100ML; MG/100ML
75 INJECTION, SOLUTION INTRAVENOUS CONTINUOUS
Status: DISCONTINUED | OUTPATIENT
Start: 2022-08-11 | End: 2022-08-13 | Stop reason: HOSPADM

## 2022-08-11 RX ORDER — HYDROMORPHONE HCL/PF 1 MG/ML
SYRINGE (ML) INJECTION AS NEEDED
Status: DISCONTINUED | OUTPATIENT
Start: 2022-08-11 | End: 2022-08-11

## 2022-08-11 RX ORDER — HYDROMORPHONE HCL/PF 1 MG/ML
0.5 SYRINGE (ML) INJECTION
Status: DISCONTINUED | OUTPATIENT
Start: 2022-08-11 | End: 2022-08-11 | Stop reason: HOSPADM

## 2022-08-11 RX ORDER — ONDANSETRON 2 MG/ML
4 INJECTION INTRAMUSCULAR; INTRAVENOUS ONCE AS NEEDED
Status: DISCONTINUED | OUTPATIENT
Start: 2022-08-11 | End: 2022-08-11 | Stop reason: HOSPADM

## 2022-08-11 RX ORDER — CEFAZOLIN SODIUM 2 G/50ML
2000 SOLUTION INTRAVENOUS EVERY 8 HOURS
Status: COMPLETED | OUTPATIENT
Start: 2022-08-11 | End: 2022-08-12

## 2022-08-11 RX ORDER — ATROPA BELLADONNA AND OPIUM 16.2; 3 MG/1; MG/1
SUPPOSITORY RECTAL AS NEEDED
Status: DISCONTINUED | OUTPATIENT
Start: 2022-08-11 | End: 2022-08-11 | Stop reason: HOSPADM

## 2022-08-11 RX ORDER — OXYCODONE HYDROCHLORIDE 5 MG/1
5 TABLET ORAL EVERY 4 HOURS PRN
Status: DISCONTINUED | OUTPATIENT
Start: 2022-08-11 | End: 2022-08-13 | Stop reason: HOSPADM

## 2022-08-11 RX ORDER — ALBUMIN, HUMAN INJ 5% 5 %
SOLUTION INTRAVENOUS CONTINUOUS PRN
Status: DISCONTINUED | OUTPATIENT
Start: 2022-08-11 | End: 2022-08-11

## 2022-08-11 RX ORDER — DEXAMETHASONE SODIUM PHOSPHATE 10 MG/ML
INJECTION, SOLUTION INTRAMUSCULAR; INTRAVENOUS AS NEEDED
Status: DISCONTINUED | OUTPATIENT
Start: 2022-08-11 | End: 2022-08-11

## 2022-08-11 RX ORDER — PROPOFOL 10 MG/ML
INJECTION, EMULSION INTRAVENOUS AS NEEDED
Status: DISCONTINUED | OUTPATIENT
Start: 2022-08-11 | End: 2022-08-11

## 2022-08-11 RX ORDER — FENTANYL CITRATE/PF 50 MCG/ML
25 SYRINGE (ML) INJECTION
Status: DISCONTINUED | OUTPATIENT
Start: 2022-08-11 | End: 2022-08-11 | Stop reason: HOSPADM

## 2022-08-11 RX ORDER — SODIUM CHLORIDE 9 MG/ML
INJECTION, SOLUTION INTRAVENOUS CONTINUOUS PRN
Status: DISCONTINUED | OUTPATIENT
Start: 2022-08-11 | End: 2022-08-11

## 2022-08-11 RX ORDER — FENTANYL CITRATE 50 UG/ML
INJECTION, SOLUTION INTRAMUSCULAR; INTRAVENOUS AS NEEDED
Status: DISCONTINUED | OUTPATIENT
Start: 2022-08-11 | End: 2022-08-11

## 2022-08-11 RX ORDER — ROCURONIUM BROMIDE 10 MG/ML
INJECTION, SOLUTION INTRAVENOUS AS NEEDED
Status: DISCONTINUED | OUTPATIENT
Start: 2022-08-11 | End: 2022-08-11

## 2022-08-11 RX ORDER — ALBUTEROL SULFATE 90 UG/1
2 AEROSOL, METERED RESPIRATORY (INHALATION) EVERY 6 HOURS PRN
Status: DISCONTINUED | OUTPATIENT
Start: 2022-08-11 | End: 2022-08-13 | Stop reason: HOSPADM

## 2022-08-11 RX ORDER — OXYCODONE HYDROCHLORIDE 5 MG/1
10 TABLET ORAL EVERY 4 HOURS PRN
Status: DISCONTINUED | OUTPATIENT
Start: 2022-08-11 | End: 2022-08-13 | Stop reason: HOSPADM

## 2022-08-11 RX ORDER — CEFAZOLIN SODIUM 2 G/50ML
2000 SOLUTION INTRAVENOUS ONCE
Status: COMPLETED | OUTPATIENT
Start: 2022-08-11 | End: 2022-08-11

## 2022-08-11 RX ORDER — HYDROMORPHONE HCL/PF 1 MG/ML
0.5 SYRINGE (ML) INJECTION EVERY 2 HOUR PRN
Status: DISCONTINUED | OUTPATIENT
Start: 2022-08-11 | End: 2022-08-13 | Stop reason: HOSPADM

## 2022-08-11 RX ORDER — MEPERIDINE HYDROCHLORIDE 25 MG/ML
12.5 INJECTION INTRAMUSCULAR; INTRAVENOUS; SUBCUTANEOUS
Status: DISCONTINUED | OUTPATIENT
Start: 2022-08-11 | End: 2022-08-11 | Stop reason: HOSPADM

## 2022-08-11 RX ORDER — GLYCOPYRROLATE 0.2 MG/ML
INJECTION INTRAMUSCULAR; INTRAVENOUS AS NEEDED
Status: DISCONTINUED | OUTPATIENT
Start: 2022-08-11 | End: 2022-08-11

## 2022-08-11 RX ORDER — SIMETHICONE 80 MG
80 TABLET,CHEWABLE ORAL 4 TIMES DAILY PRN
Status: DISCONTINUED | OUTPATIENT
Start: 2022-08-11 | End: 2022-08-13 | Stop reason: HOSPADM

## 2022-08-11 RX ORDER — ACETAMINOPHEN 325 MG/1
650 TABLET ORAL EVERY 6 HOURS SCHEDULED
Status: DISCONTINUED | OUTPATIENT
Start: 2022-08-11 | End: 2022-08-13 | Stop reason: HOSPADM

## 2022-08-11 RX ORDER — ONDANSETRON 2 MG/ML
INJECTION INTRAMUSCULAR; INTRAVENOUS AS NEEDED
Status: DISCONTINUED | OUTPATIENT
Start: 2022-08-11 | End: 2022-08-11

## 2022-08-11 RX ADMIN — DEXMEDETOMIDINE HCL 8 MCG: 100 INJECTION INTRAVENOUS at 10:33

## 2022-08-11 RX ADMIN — ROCURONIUM BROMIDE 20 MG: 50 INJECTION, SOLUTION INTRAVENOUS at 09:33

## 2022-08-11 RX ADMIN — HYOSCYAMINE SULFATE 0.12 MG: 0.12 TABLET SUBLINGUAL at 16:31

## 2022-08-11 RX ADMIN — ACETAMINOPHEN 325MG 650 MG: 325 TABLET ORAL at 23:01

## 2022-08-11 RX ADMIN — PROPOFOL 200 MG: 10 INJECTION, EMULSION INTRAVENOUS at 07:33

## 2022-08-11 RX ADMIN — ROCURONIUM BROMIDE 10 MG: 50 INJECTION, SOLUTION INTRAVENOUS at 11:12

## 2022-08-11 RX ADMIN — SODIUM CHLORIDE, POTASSIUM CHLORIDE, SODIUM LACTATE AND CALCIUM CHLORIDE: 600; 310; 30; 20 INJECTION, SOLUTION INTRAVENOUS at 08:48

## 2022-08-11 RX ADMIN — DEXMEDETOMIDINE HCL 4 MCG: 100 INJECTION INTRAVENOUS at 09:49

## 2022-08-11 RX ADMIN — OXYCODONE 5 MG: 5 TABLET ORAL at 15:17

## 2022-08-11 RX ADMIN — DEXMEDETOMIDINE HCL 8 MCG: 100 INJECTION INTRAVENOUS at 11:10

## 2022-08-11 RX ADMIN — HYDROMORPHONE HYDROCHLORIDE 0.5 MG: 1 INJECTION, SOLUTION INTRAMUSCULAR; INTRAVENOUS; SUBCUTANEOUS at 12:02

## 2022-08-11 RX ADMIN — ALBUMIN (HUMAN): 12.5 INJECTION, SOLUTION INTRAVENOUS at 10:06

## 2022-08-11 RX ADMIN — ROCURONIUM BROMIDE 20 MG: 50 INJECTION, SOLUTION INTRAVENOUS at 08:47

## 2022-08-11 RX ADMIN — ONDANSETRON 4 MG: 2 INJECTION INTRAMUSCULAR; INTRAVENOUS at 12:04

## 2022-08-11 RX ADMIN — PHENYLEPHRINE HYDROCHLORIDE 30 MCG/MIN: 10 INJECTION INTRAVENOUS at 07:41

## 2022-08-11 RX ADMIN — LIDOCAINE HYDROCHLORIDE 100 MG: 20 INJECTION, SOLUTION EPIDURAL; INFILTRATION; INTRACAUDAL at 07:36

## 2022-08-11 RX ADMIN — ONDANSETRON 4 MG: 2 INJECTION INTRAMUSCULAR; INTRAVENOUS at 08:00

## 2022-08-11 RX ADMIN — SODIUM CHLORIDE, POTASSIUM CHLORIDE, SODIUM LACTATE AND CALCIUM CHLORIDE 125 ML/HR: 600; 310; 30; 20 INJECTION, SOLUTION INTRAVENOUS at 23:01

## 2022-08-11 RX ADMIN — GLYCOPYRROLATE 0.4 MG: 0.2 INJECTION, SOLUTION INTRAMUSCULAR; INTRAVENOUS at 12:23

## 2022-08-11 RX ADMIN — DEXMEDETOMIDINE HCL 8 MCG: 100 INJECTION INTRAVENOUS at 11:43

## 2022-08-11 RX ADMIN — DEXMEDETOMIDINE HCL 4 MCG: 100 INJECTION INTRAVENOUS at 09:36

## 2022-08-11 RX ADMIN — DEXMEDETOMIDINE HCL 4 MCG: 100 INJECTION INTRAVENOUS at 10:13

## 2022-08-11 RX ADMIN — ALBUMIN (HUMAN): 12.5 INJECTION, SOLUTION INTRAVENOUS at 11:06

## 2022-08-11 RX ADMIN — DEXMEDETOMIDINE HCL 4 MCG: 100 INJECTION INTRAVENOUS at 09:57

## 2022-08-11 RX ADMIN — SODIUM CHLORIDE, POTASSIUM CHLORIDE, SODIUM LACTATE AND CALCIUM CHLORIDE 125 ML/HR: 600; 310; 30; 20 INJECTION, SOLUTION INTRAVENOUS at 16:31

## 2022-08-11 RX ADMIN — ROCURONIUM BROMIDE 10 MG: 50 INJECTION, SOLUTION INTRAVENOUS at 08:18

## 2022-08-11 RX ADMIN — SODIUM CHLORIDE, POTASSIUM CHLORIDE, SODIUM LACTATE AND CALCIUM CHLORIDE 125 ML/HR: 600; 310; 30; 20 INJECTION, SOLUTION INTRAVENOUS at 05:57

## 2022-08-11 RX ADMIN — CEFAZOLIN SODIUM 2000 MG: 2 SOLUTION INTRAVENOUS at 07:36

## 2022-08-11 RX ADMIN — NEOSTIGMINE METHYLSULFATE 1 MG: 1 INJECTION INTRAVENOUS at 12:31

## 2022-08-11 RX ADMIN — SENNOSIDES 8.6 MG: 8.6 TABLET, FILM COATED ORAL at 15:17

## 2022-08-11 RX ADMIN — MIDAZOLAM HYDROCHLORIDE 2 MG: 1 INJECTION, SOLUTION INTRAMUSCULAR; INTRAVENOUS at 07:26

## 2022-08-11 RX ADMIN — MIDAZOLAM HYDROCHLORIDE 2 MG: 1 INJECTION, SOLUTION INTRAMUSCULAR; INTRAVENOUS at 07:32

## 2022-08-11 RX ADMIN — FENTANYL CITRATE 50 MCG: 50 INJECTION INTRAMUSCULAR; INTRAVENOUS at 08:47

## 2022-08-11 RX ADMIN — DEXMEDETOMIDINE HCL 8 MCG: 100 INJECTION INTRAVENOUS at 11:18

## 2022-08-11 RX ADMIN — HYDROMORPHONE HYDROCHLORIDE 0.5 MG: 1 INJECTION, SOLUTION INTRAMUSCULAR; INTRAVENOUS; SUBCUTANEOUS at 12:44

## 2022-08-11 RX ADMIN — HYDROMORPHONE HYDROCHLORIDE 0.5 MG: 1 INJECTION, SOLUTION INTRAMUSCULAR; INTRAVENOUS; SUBCUTANEOUS at 20:54

## 2022-08-11 RX ADMIN — DEXMEDETOMIDINE HCL 8 MCG: 100 INJECTION INTRAVENOUS at 10:22

## 2022-08-11 RX ADMIN — DEXMEDETOMIDINE HCL 4 MCG: 100 INJECTION INTRAVENOUS at 10:04

## 2022-08-11 RX ADMIN — NEOSTIGMINE METHYLSULFATE 2 MG: 1 INJECTION INTRAVENOUS at 12:23

## 2022-08-11 RX ADMIN — OXYCODONE 10 MG: 5 TABLET ORAL at 19:49

## 2022-08-11 RX ADMIN — DEXMEDETOMIDINE HCL 8 MCG: 100 INJECTION INTRAVENOUS at 10:54

## 2022-08-11 RX ADMIN — DEXAMETHASONE SODIUM PHOSPHATE 10 MG: 10 INJECTION INTRAMUSCULAR; INTRAVENOUS at 08:00

## 2022-08-11 RX ADMIN — FENTANYL CITRATE 25 MCG: 50 INJECTION, SOLUTION INTRAMUSCULAR; INTRAVENOUS at 13:41

## 2022-08-11 RX ADMIN — DEXMEDETOMIDINE HCL 4 MCG: 100 INJECTION INTRAVENOUS at 09:41

## 2022-08-11 RX ADMIN — HYOSCYAMINE SULFATE 0.12 MG: 0.12 TABLET SUBLINGUAL at 20:48

## 2022-08-11 RX ADMIN — GLYCOPYRROLATE 0.2 MG: 0.2 INJECTION, SOLUTION INTRAMUSCULAR; INTRAVENOUS at 12:30

## 2022-08-11 RX ADMIN — FENTANYL CITRATE 50 MCG: 50 INJECTION INTRAMUSCULAR; INTRAVENOUS at 08:18

## 2022-08-11 RX ADMIN — DEXMEDETOMIDINE HCL 8 MCG: 100 INJECTION INTRAVENOUS at 12:02

## 2022-08-11 RX ADMIN — SODIUM CHLORIDE, POTASSIUM CHLORIDE, SODIUM LACTATE AND CALCIUM CHLORIDE: 600; 310; 30; 20 INJECTION, SOLUTION INTRAVENOUS at 12:30

## 2022-08-11 RX ADMIN — CEFAZOLIN SODIUM 2000 MG: 2 SOLUTION INTRAVENOUS at 19:49

## 2022-08-11 RX ADMIN — SODIUM CHLORIDE: 0.9 INJECTION, SOLUTION INTRAVENOUS at 07:40

## 2022-08-11 RX ADMIN — HYDROMORPHONE HYDROCHLORIDE 0.5 MG: 1 INJECTION, SOLUTION INTRAMUSCULAR; INTRAVENOUS; SUBCUTANEOUS at 16:31

## 2022-08-11 RX ADMIN — ACETAMINOPHEN 325MG 650 MG: 325 TABLET ORAL at 15:17

## 2022-08-11 RX ADMIN — GABAPENTIN 300 MG: 300 CAPSULE ORAL at 20:54

## 2022-08-11 RX ADMIN — SUGAMMADEX 200 MG: 100 INJECTION, SOLUTION INTRAVENOUS at 12:42

## 2022-08-11 RX ADMIN — NEOSTIGMINE METHYLSULFATE 2 MG: 1 INJECTION INTRAVENOUS at 12:18

## 2022-08-11 RX ADMIN — GLYCOPYRROLATE 0.4 MG: 0.2 INJECTION, SOLUTION INTRAMUSCULAR; INTRAVENOUS at 12:18

## 2022-08-11 RX ADMIN — OXYBUTYNIN CHLORIDE 5 MG: 5 TABLET, EXTENDED RELEASE ORAL at 14:09

## 2022-08-11 RX ADMIN — ROCURONIUM BROMIDE 50 MG: 50 INJECTION, SOLUTION INTRAVENOUS at 07:33

## 2022-08-11 RX ADMIN — CEFAZOLIN SODIUM 2000 MG: 2 SOLUTION INTRAVENOUS at 11:39

## 2022-08-11 RX ADMIN — FENTANYL CITRATE 100 MCG: 50 INJECTION INTRAMUSCULAR; INTRAVENOUS at 07:32

## 2022-08-11 RX ADMIN — PRAVASTATIN SODIUM 40 MG: 40 TABLET ORAL at 16:31

## 2022-08-11 NOTE — OP NOTE
OPERATIVE REPORT  PATIENT NAME: Bryan Bee    :  1960  MRN: [id number]  Pt Location: [facility] OR ROOM 08    SURGERY DATE: 2022    Surgeon(s) and Role:     * Verdell Gilford, MD - Primary     * Gurjit Alfaro PA-C - Assisting    Preop Diagnosis:  Prostate cancer ([facility] [address] ) Ardelle Iha    Post-Op Diagnosis Codes:     * Prostate cancer ([facility] [address] ) Ardelle Iha    Procedure(s) (LRB):  ROBOTIC PROSTATECTOMY RADICAL , BLADDER NECK RECONSTRUCTION (N/A)    Specimen(s):  ID Type Source Tests Collected by Time Destination   1 : cely-prostatic fat Tissue Prostate TISSUE EXAM Verdell Gilford, MD 2022 0859    2 : prostate and seminal vesicles Tissue Prostate TISSUE EXAM Verdell Gilford, MD 2022 1029        Estimated Blood Loss:   400 mL    Drains:  Closed/Suction Drain Anterior Abdomen Bulb 19 Fr  (Active)   Number of days: 0       NG/OG/Enteral Tube Orogastric 18 Fr Right mouth (Active)   Number of days: 0       Urethral Catheter Non-latex 20 Fr  (Active)   Number of days: 0       [REMOVED] Urethral Catheter Non-latex 18 Fr  (Removed)   Number of days: 0       Anesthesia Type:   General    Operative Indications:  Prostate cancer ([facility] [address] ) [C61]      Operative Findings:  1  Median lobe component excised along with prostate-specific  2  Bilateral nerve-sparing, extreme caution used on the right posterior apical tissue given MRI findings with some additional cautery used in this area  3  Bladder neck reconstruction given the wide bladder neck from the median lobe  4  Watertight anastomosis to 150 cc  5   Some oozing from underside of the urethra after anastomosis, careful cautery applied ultimately, controlled with pressure application, Surgicel and Surgiflo  6  400 cc estimated blood loss     Complications:   None    Procedure and Technique:    Bryan Bee is a 57 y o  y o  male with a history of Pamela 3+4=7 prostate cancer identified in right posterior medial the prostate with abnormal findings of his multiparametric MRI nodules encroaching the capsule  Risk and benefits of da Yazan robot-assisted laparoscopic prostatectomy with bilateral pelvic lymph node dissection were discussed and reviewed  Informed consent was obtained and the patient was returned to the operating room on 8/11/2022  After the smooth induction of general endotracheal anesthesia, the patient was placed in a low lithotomy position  Venodyne boots were applied  Pressure points were padded  The patient was secured to the bed with padding, towels, and tape  Intravenous antibiotics were administered  A timeout was performed with all members of the operative team confirming the patient's identity and procedure to be performed  Digital rectal exam was performed and a belladonna and opium suppositories placed per rectum  An 8mm supra-umbilical skin incision was made  The skin was elevated  A Veress needle was utilized to create a pneumoperitoneum  A 8 mm Xi camera port was then placed  The patient was placed into steep Trendelenburg  2 additional working robotic ports were placed at the level of the umbilicus and approximately 4 fingerbreadths lateral to the umbilicus  An additional left lower quadrant robotic port was placed and a right mid quadrant 12 mm assistant port was placed  A 5 mm assistant port was placed in the right upper quadrant  The robot was docked  We identified the bladder neck by manipulating the Ramachandran catheter  A posterior peritoneotomy was created behind the bladder and beneath the prostate until denonvilles fascia was identified  I then identified the vasa deferentia  Bilateral vasa deferentia were dissected proximally and transected  I then used each vas as a handle to provide traction to dissect out the ipsilateral seminal vesicle  Both seminal vesicles were dissected to their tip  I then performed exclusive sharp dissection beneath the prostate creating a plane between the prostate and the rectum      The urachal structures were taken down  The bladder was dropped and the space of Retzius was developed  Fibrofatty tissue was cleaned from the dorsum of the prostate and the endopelvic fascia  A large superficial venous complex was identified on the dorsum of the prostate  This was taken with bipolar electro dissection  I then opened the endopelvic fascia bilaterally from base to apex first on the right and then on the left  I dropped the puboprostatic ligaments  I exposed the dorsal venous complex  Muscular fibers were swept off the dorsal venous complex to expose the notch between the DVC and the urethra  The dorsal venous complex was ligated with 0 Vicryl  Next a focused my attention on the prostatic vesical junction  Hot and cold scissor dissection was utilized to create a plane between the bladder and prostate until the Ramachandran catheter was identified in the midline  The balloon was deflated area the catheter was brought into the surgical field and placed on tension  The posterior bladder neck was visualized  There did appear to be a small median lobe component  Dissection underneath this median lobe was performed and median lobe itself was elevated with the Cobra grasper  We did visualize the ureteral orifices which were close in proximity to the posterior bladder neck margin but not involved  We took great care to avoid these with cautery  Unfortunately because of the median lobe, there did appear to be a wide bladder neck  At this point time, the previously dissected vas deferens and seminal vesicles were elevated anteriorly into the field  Attention was now focused on the left vascular pedicle  We had discussed LEFT nerve sparing and attempts at RIGHT depending on surgical anatomy  Packets of the vascular pedicle were created with the scissor and Weck clips were applied  Exclusive sharp scissor dissection was used to take the left vascular pedicle    I then incised the lateral prostatic fascia from base to apex  I began to then create a plane between the left neurovascular bundle and the prostate itself  The capsule of the prostate was clearly visualized and not violated  I perform this dissection all the way from base to apex  This procedure was then repeated on the right side  We used extreme caution in the posterior medial and lateral prostate given the MRI findings of nodule  There did appear to be an area of nodularity where some application of sharp dissection and point electrocautery was performed towards the apex  The last remaining attachments were the dorsal venous complex and the urethra  The dorsal venous complex was taken with hot monopolar scissor initially and then and cold scissor dissection towards the lateral and apical portion of the prostate     I then placed the prostate on traction and identified the urethra  The urethra was taken sharply with scissors  The Ramachandran catheter was pulled back and the posterior urethra was taken with sharp scissors as well  The last remaining attachments of the rectourethralis muscle were taken with sharp scissors  At this point this specimen was completely free within the pelvis and placed into an Endo Catch bag  Because of the patient's lymph node probability under 5%, we had discussed avoidance of a lymph node dissection given the low probability of involvement and the risk of complications  Digital rectal exam was performed  Rectum was confirmed to be healthy and intact without injury  Again, we reinspected the bladder neck  Visualized again the ureteral orifices in the widening of the bladder neck  An 0 Vicryl suture was used to perform horizontal mattress sutures on the lateral edges to reapproximate these areas and taper the bladder neck in a bladder neck reconstruction  We took great care to avoid the ureteral orifices      Sajan stitch was then performed to approximate the posterior bladder to areas of the lateral endopelvic fascia  There unfortunately was very little visualize rectourethralis remaining  I then performed the anastomosis between the bladder and urethra  Anastomosis was significantly difficult given the funneling and depth of the patient's pelvis  Utilization of the 30 degree and 0 degree lens was required  I started with 2 V lock sutures placed in an outside in fashion on the bladder neck posteriorly  I was careful again to visualize the ureteral orifices  Stitches were taken on either side to avoid imbricating the structures into the anastomosis  On the lateral edges, we visualized our reconstruction as we moved from posterior to anterior bladder neck  The anastomosis was performed in an inside out fashion on the urethra and an outside in fashion on the bladder  Once the 2 sutures were placed in either side tension was carefully taken off of the sutures until there was excellent reapproximation of the posterior urethra to the posterior bladder neck  I then completed approximately two thirds of the anastomosis in this fashion  I ultimately locked suture on the bladder side and completed the anastomosis in an outside in fashion on the urethra and in an inside out fashion on the bladder  Prior to completing the anastomosis a new Ramachandran catheter was placed under vision  The anastomosis was completed and the sutures were secured  20 cc were placed into the balloon  The catheter was placed on gentle traction and the bladder was irrigated with over 150 cc of sterile saline with minimal extravasation  At this point time, we began to experience some oozing of blood from the right lateral gutter  The bladder was mobilized carefully to a medial position to inspect the area of nerve dissection  Very careful point cautery was placed around the nerve dissection to avoid cautery in this region  There did appear to be some oozing coming from underneath the anastomosis in the deep pelvis    Surgicel was placed in this area and held with the robotic instruments for 5 minutes  This dramatically improved the oozing  Pneumoperitoneum was dropped from 15-12 and then to 10 mmHg we did not see any additional significant bleeding  Surgiflo was placed in both the right and left gutter and underneath the urethra  Surgicel was left in place  A Victor Manuel drain was then brought out through the left lower quadrant robotic port under vision  The Endo Catch bag string was brought out through the supra umbilical 8 mm port  The supraumbilical 8 mm port was opened to allow for easy removal of the prostate and seminal vesicles within the Endo Catch bag  The fascia was then reapproximated with Vicryl UR 6 suture  All incisions were irrigated and the skin was closed with 4-0 Monocryl and history  The drain was secured in place with a drain stitch and a drain sponge was placed  The catheter was placed onto gentle traction and secured  Overall the patient tolerated the procedure well and there were no complications  The patient was extubated in the operating room and transferred to the PACU in stable condition at the conclusion of the case       I was present for the entire procedure, A qualified resident physician was not available and A physician assistant was required during the procedure for retraction tissue handling,dissection and suturing    Patient Disposition:  PACU       SIGNATURE: Larissa Araujo MD  DATE: August 11, 2022  TIME: 12:24 PM

## 2022-08-11 NOTE — ANESTHESIA PREPROCEDURE EVALUATION
Procedure:  ROBOTIC PROSTATECTOMY RADICAL AND PELVIC LYMPH NODE DISSECTION (N/A Abdomen)    Relevant Problems   ANESTHESIA (within normal limits)      CARDIO   (+) HTN (hypertension)   (+) Hyperlipemia   (+) SVT (supraventricular tachycardia) (HCC)      ENDO (within normal limits)      GI/HEPATIC   (+) Gastroesophageal reflux disease without esophagitis      /RENAL   (+) Abnormality detected on rectal examination of prostate   (+) Prostate CA ([address] )      GYN (within normal limits)      HEMATOLOGY (within normal limits)      MUSCULOSKELETAL (within normal limits)      NEURO/PSYCH   (+) Myelopathy (HCC)      PULMONARY (within normal limits)             Anesthesia Plan  ASA Score- 2     Anesthesia Type- general with ASA Monitors  Additional Monitors: arterial line  Airway Plan: ETT  Comment: +/- A-Line  Plan Factors-Exercise tolerance (METS): >4 METS  Chart reviewed  EKG reviewed  Existing labs reviewed  Patient summary reviewed  Patient is not a current smoker  Obstructive sleep apnea risk education given perioperatively  Induction- intravenous  Postoperative Plan- Plan for postoperative opioid use  Planned trial extubation    Informed Consent- Anesthetic plan and risks discussed with patient

## 2022-08-11 NOTE — INTERVAL H&P NOTE
H&P reviewed  After examining the patient I find no changes in the patients condition since the H&P had been written  Appreciate medical optimization  Patient has been well counseled about plan for prostatectomy  Plan to avoid lymph node dissection given probability less than 5% involvement  Planning for nerve sparing, at least LEFT sided  Caution with RIGHT posterior apical given mpMRI findings  Risks and benefits reviewed      Vitals:    08/11/22 0603   BP: 128/88   Pulse: 74   Resp: 18   Temp: 97 8 °F (36 6 °C)   SpO2: 95%

## 2022-08-11 NOTE — PLAN OF CARE
Problem: PAIN - ADULT  Goal: Verbalizes/displays adequate comfort level or baseline comfort level  Description: Interventions:  - Encourage patient to monitor pain and request assistance  - Assess pain using appropriate pain scale  - Administer analgesics based on type and severity of pain and evaluate response  - Implement non-pharmacological measures as appropriate and evaluate response  - Consider cultural and social influences on pain and pain management  - Notify physician/advanced practitioner if interventions unsuccessful or patient reports new pain  Outcome: Progressing     Problem: INFECTION - ADULT  Goal: Absence or prevention of progression during hospitalization  Description: INTERVENTIONS:  - Assess and monitor for signs and symptoms of infection  - Monitor lab/diagnostic results  - Monitor all insertion sites, i e  indwelling lines, tubes, and drains  - Monitor endotracheal if appropriate and nasal secretions for changes in amount and color  - [place] appropriate cooling/warming therapies per order  - Administer medications as ordered  - Instruct and encourage patient and family to use good hand hygiene technique  - Identify and instruct in appropriate isolation precautions for identified infection/condition  Outcome: Progressing  Goal: Absence of fever/infection during neutropenic period  Description: INTERVENTIONS:  - Monitor WBC    Outcome: Progressing     Problem: SAFETY ADULT  Goal: Patient will remain free of falls  Description: INTERVENTIONS:  - Educate patient/family on patient safety including physical limitations  - Instruct patient to call for assistance with activity   - Consult OT/PT to assist with strengthening/mobility   - Keep Call bell within reach  - Keep bed low and locked with side rails adjusted as appropriate  - Keep care items and personal belongings within reach  - Initiate and maintain comfort rounds  - Make Fall Risk Sign visible to staff  - Offer Toileting every  Hours, in advance of need  - Initiate/Maintain alarm  - Obtain necessary fall risk management equipment:   - Apply yellow socks and bracelet for high fall risk patients  - Consider moving patient to room near nurses station  Outcome: Progressing  Goal: Maintain or return to baseline ADL function  Description: INTERVENTIONS:  -  Assess patient's ability to carry out ADLs; assess patient's baseline for ADL function and identify physical deficits which impact ability to perform ADLs (bathing, care of mouth/teeth, toileting, grooming, dressing, etc )  - Assess/evaluate cause of self-care deficits   - Assess range of motion  - Assess patient's mobility; develop plan if impaired  - Assess patient's need for assistive devices and provide as appropriate  - Encourage maximum independence but intervene and supervise when necessary  - Involve family in performance of ADLs  - Assess for home care needs following discharge   - Consider OT consult to assist with ADL evaluation and planning for discharge  - Provide patient education as appropriate  Outcome: Progressing  Goal: Maintains/Returns to pre admission functional level  Description: INTERVENTIONS:  - Perform BMAT or MOVE assessment daily    - Set and communicate daily mobility goal to care team and patient/family/caregiver  - Collaborate with rehabilitation services on mobility goals if consulted  - Perform Range of Motion times a day  - Reposition patient every  hours    - Dangle patient  times a day  - Stand patient times a day  - Ambulate patient  times a day  - Out of bed to chair  times a day   - Out of bed for meals  times a day  - Out of bed for toileting  - Record patient progress and toleration of activity level   Outcome: Progressing     Problem: DISCHARGE PLANNING  Goal: Discharge to home or other facility with appropriate resources  Description: INTERVENTIONS:  - Identify barriers to discharge w/patient and caregiver  - Arrange for needed discharge resources and transportation as appropriate  - Identify discharge learning needs (meds, wound care, etc )  - Arrange for interpretive services to assist at discharge as needed  - Refer to Case Management Department for coordinating discharge planning if the patient needs post-hospital services based on physician/advanced practitioner order or complex needs related to functional status, cognitive ability, or social support system  Outcome: Progressing     Problem: Knowledge Deficit  Goal: Patient/family/caregiver demonstrates understanding of disease process, treatment plan, medications, and discharge instructions  Description: Complete learning assessment and assess knowledge base    Interventions:  - Provide teaching at level of understanding  - Provide teaching via preferred learning methods  Outcome: Progressing

## 2022-08-11 NOTE — ANESTHESIA POSTPROCEDURE EVALUATION
Post-Op Assessment Note    CV Status:  Stable  Pain Score: 2    Pain management: adequate     Mental Status:  Alert and awake   Hydration Status:  Euvolemic   PONV Controlled:  Controlled   Airway Patency:  Patent      Post Op Vitals Reviewed: Yes      Staff: Anesthesiologist   Comments: right arm ovoid formation of low lying blisters where BP cuff was placed   not bothersome to pt   was discovered incidently 4 cm x 1 cm approx         No complications documented      BP      Temp     Pulse     Resp      SpO2      /75   Pulse 82   Temp (!) 97 1 °F (36 2 °C) (Temporal)   Resp 16   Wt 107 kg (234 lb 12 6 oz)   SpO2 95%   BMI 34 67 kg/m²

## 2022-08-12 LAB
ANION GAP SERPL CALCULATED.3IONS-SCNC: 12 MMOL/L (ref 4–13)
BUN SERPL-MCNC: 15 MG/DL (ref 5–25)
CALCIUM SERPL-MCNC: 8.9 MG/DL (ref 8.3–10.1)
CHLORIDE SERPL-SCNC: 105 MMOL/L (ref 96–108)
CO2 SERPL-SCNC: 22 MMOL/L (ref 21–32)
CREAT SERPL-MCNC: 1.14 MG/DL (ref 0.6–1.3)
ERYTHROCYTE [DISTWIDTH] IN BLOOD BY AUTOMATED COUNT: 13.2 % (ref 11.6–15.1)
GFR SERPL CREATININE-BSD FRML MDRD: 68 ML/MIN/1.73SQ M
GLUCOSE SERPL-MCNC: 126 MG/DL (ref 65–140)
HCT VFR BLD AUTO: 41.6 % (ref 36.5–49.3)
HGB BLD-MCNC: 13.5 G/DL (ref 12–17)
MCH RBC QN AUTO: 30.1 PG (ref 26.8–34.3)
MCHC RBC AUTO-ENTMCNC: 32.5 G/DL (ref 31.4–37.4)
MCV RBC AUTO: 93 FL (ref 82–98)
PLATELET # BLD AUTO: 198 THOUSANDS/UL (ref 149–390)
PMV BLD AUTO: 11.1 FL (ref 8.9–12.7)
POTASSIUM SERPL-SCNC: 4.6 MMOL/L (ref 3.5–5.3)
RBC # BLD AUTO: 4.49 MILLION/UL (ref 3.88–5.62)
SODIUM SERPL-SCNC: 139 MMOL/L (ref 135–147)
WBC # BLD AUTO: 9.52 THOUSAND/UL (ref 4.31–10.16)

## 2022-08-12 PROCEDURE — 85027 COMPLETE CBC AUTOMATED: CPT | Performed by: UROLOGY

## 2022-08-12 PROCEDURE — 99024 POSTOP FOLLOW-UP VISIT: CPT

## 2022-08-12 PROCEDURE — 80048 BASIC METABOLIC PNL TOTAL CA: CPT | Performed by: UROLOGY

## 2022-08-12 RX ORDER — OXYBUTYNIN CHLORIDE 5 MG/1
5 TABLET ORAL 3 TIMES DAILY
Status: DISCONTINUED | OUTPATIENT
Start: 2022-08-12 | End: 2022-08-13 | Stop reason: HOSPADM

## 2022-08-12 RX ADMIN — OXYCODONE 5 MG: 5 TABLET ORAL at 12:23

## 2022-08-12 RX ADMIN — CEFAZOLIN SODIUM 2000 MG: 2 SOLUTION INTRAVENOUS at 02:45

## 2022-08-12 RX ADMIN — ACETAMINOPHEN 325MG 650 MG: 325 TABLET ORAL at 06:05

## 2022-08-12 RX ADMIN — HYDROMORPHONE HYDROCHLORIDE 0.5 MG: 1 INJECTION, SOLUTION INTRAMUSCULAR; INTRAVENOUS; SUBCUTANEOUS at 06:05

## 2022-08-12 RX ADMIN — HYOSCYAMINE SULFATE 0.12 MG: 0.12 TABLET SUBLINGUAL at 06:05

## 2022-08-12 RX ADMIN — SIMETHICONE 80 MG: 80 TABLET, CHEWABLE ORAL at 17:51

## 2022-08-12 RX ADMIN — HYDROMORPHONE HYDROCHLORIDE 0.5 MG: 1 INJECTION, SOLUTION INTRAMUSCULAR; INTRAVENOUS; SUBCUTANEOUS at 00:54

## 2022-08-12 RX ADMIN — SENNOSIDES 8.6 MG: 8.6 TABLET, FILM COATED ORAL at 08:34

## 2022-08-12 RX ADMIN — OXYBUTYNIN CHLORIDE 5 MG: 5 TABLET ORAL at 15:39

## 2022-08-12 RX ADMIN — SODIUM CHLORIDE, POTASSIUM CHLORIDE, SODIUM LACTATE AND CALCIUM CHLORIDE 125 ML/HR: 600; 310; 30; 20 INJECTION, SOLUTION INTRAVENOUS at 06:05

## 2022-08-12 RX ADMIN — OXYCODONE 10 MG: 5 TABLET ORAL at 16:36

## 2022-08-12 RX ADMIN — SIMETHICONE 80 MG: 80 TABLET, CHEWABLE ORAL at 23:04

## 2022-08-12 RX ADMIN — OXYBUTYNIN CHLORIDE 5 MG: 5 TABLET, EXTENDED RELEASE ORAL at 08:34

## 2022-08-12 RX ADMIN — PRAVASTATIN SODIUM 40 MG: 40 TABLET ORAL at 15:38

## 2022-08-12 RX ADMIN — ACETAMINOPHEN 325MG 650 MG: 325 TABLET ORAL at 17:51

## 2022-08-12 RX ADMIN — GABAPENTIN 300 MG: 300 CAPSULE ORAL at 21:17

## 2022-08-12 RX ADMIN — ACETAMINOPHEN 325MG 650 MG: 325 TABLET ORAL at 12:22

## 2022-08-12 RX ADMIN — ONDANSETRON 4 MG: 2 INJECTION INTRAMUSCULAR; INTRAVENOUS at 23:38

## 2022-08-12 RX ADMIN — OXYBUTYNIN CHLORIDE 5 MG: 5 TABLET ORAL at 21:17

## 2022-08-12 RX ADMIN — ACETAMINOPHEN 325MG 650 MG: 325 TABLET ORAL at 23:03

## 2022-08-12 RX ADMIN — HYOSCYAMINE SULFATE 0.12 MG: 0.12 TABLET SUBLINGUAL at 00:54

## 2022-08-12 RX ADMIN — OXYCODONE 10 MG: 5 TABLET ORAL at 03:58

## 2022-08-12 NOTE — QUICK NOTE
Received TT message from Dr Ahmet Hernandez  Shortly after I spoke with the patient, nursing had reported that when they went to stand the patient, a large amount of urethral drainage was noted to be pudding on the floor  They reported it as clear like urine, not blood  Dr Ahmet Heranndez is hopeful this is post surgical fluid that improves, but if significant and persist today, could be a sign of anastomotic leak  Nursing was instructed to place depends around malave and still mobilize him  Will reevaluate later day           Den Chappell

## 2022-08-12 NOTE — PHYSICIAN ADVISOR
Current patient class: Outpatient Surgery  The patient is currently on Hospital Day: 2 at [address] [place] [address]          After review of the relevant documentation, labs, vital signs and test results, the patient is appropriate for OPS status  Rationale is as follows: The patient is a 57 yrs Male who presented 8/11/22 for ROBOTIC PROSTATECTOMY RADICAL , BLADDER NECK RECONSTRUCTION (N/A)  He was initially admitted OPS  His surgery went well  On post op day 1, his pain is well controlled but he developed drainage around malave catheter worse with standing  There was some concern for anastomotic leak  Urology recommends patient stay another night for observation  Continue OPS and reassess tomorrow      The patients vitals on arrival were   ED Triage Vitals   Temperature Pulse Respirations Blood Pressure SpO2   08/11/22 0603 08/11/22 0603 08/11/22 0603 08/11/22 0603 08/11/22 0603   97 8 °F (36 6 °C) 74 18 128/88 95 %      Temp Source Heart Rate Source Patient Position - Orthostatic VS BP Location FiO2 (%)   08/11/22 0603 08/11/22 0603 08/11/22 1537 08/11/22 1537 --   Temporal Monitor Lying Right arm       Pain Score       08/11/22 0603       No Pain           Past Medical History:   Diagnosis Date    Anxiety     Cancer ([facility] [address] ) 6/15/22    Prostatic    GERD (gastroesophageal reflux disease) 2001    HL (hearing loss)     B/L    Hyperlipidemia     Hypertension     Obesity      Past Surgical History:   Procedure Laterality Date    COLONOSCOPY      [place] BIOPSY OF PROSTATE,NEEDLE,TRANSPERINEAL N/A 06/08/2022    Procedure: TRANSPERINEAL MRI FUSION BIOPSY PROSTATE;  Surgeon: Evelyn Vincent MD;  [facility];  Service: Urology    CO LAP,PROSTATECTOMY,RADICAL,W/NERVE [address] ROBOTIC N/A 8/11/2022    Procedure: ROBOTIC PROSTATECTOMY RADICAL , BLADDER NECK RECONSTRUCTION;  Surgeon: Roxy France MD;  [facility];  Service: Urology   [address] [place] [address] VASECTOMY      WISDOM TOOTH EXTRACTION         The patient was admitted to the hospital at N/A on N/A for the following diagnosis:  Prostate cancer ([facility] [address] ) Juan Antonio Silva    Consults have been placed to:   None    Vitals:    08/11/22 2232 08/12/22 0252 08/12/22 0700 08/12/22 1235   BP: 113/59 121/67 123/75 116/64   BP Location: Right arm   Right arm   Pulse: 88 78 83 74   Resp: 17 20 20 19   Temp: 99 2 °F (37 3 °C) 98 9 °F (37 2 °C) 97 8 °F (36 6 °C) 99 2 °F (37 3 °C)   TempSrc: Temporal Temporal Temporal Temporal   SpO2: 95% 97% 96% 95%   Weight:           Most recent labs:    Recent Labs     08/12/22  0425   WBC 9 52   HGB 13 5   HCT 41 6      K 4 6   CALCIUM 8 9   BUN 15   CREATININE 1 14       Scheduled Meds:  Current Facility-Administered Medications   Medication Dose Route Frequency Provider Last Rate    acetaminophen  650 mg Oral Q6H [address] Marly Figueroa MD      albuterol  2 puff Inhalation Q6H PRN Marly Figueroa MD      gabapentin  300 mg Oral HS Marly Figueroa MD      HYDROmorphone  0 5 mg Intravenous Q2H PRN Marly Figueroa MD      hyoscyamine  0 125 mg Sublingual Q4H PRN Marly Figueroa MD      lactated ringers  150 mL/hr Intravenous Continuous Marly Figueroa  mL/hr (08/11/22 1304)    lactated ringers  75 mL/hr Intravenous Continuous ANDRES Alba Stopped (08/12/22 1312)    montelukast  10 mg Oral HS Marly Figueroa MD      ondansetron  4 mg Intravenous Q6H PRN Marly Figueroa MD      oxybutynin  5 mg Oral TID ANDRES Alba      oxyCODONE  10 mg Oral Q4H PRN Marly Figueroa MD      oxyCODONE  5 mg Oral Q4H PRN Marly Figueroa MD      pravastatin  40 mg Oral Daily With Sim Carroll MD      senna  1 tablet Oral Daily Marly Figueroa MD      simethicone  80 mg Oral 4x Daily PRN Marly Figueroa MD       Continuous Infusions:lactated ringers, 150 mL/hr, Last Rate: 125 mL/hr (08/11/22 1304)  lactated ringers, 75 mL/hr, Last Rate: Stopped (08/12/22 1312)      PRN Meds:   albuterol    HYDROmorphone    hyoscyamine    ondansetron    oxyCODONE    oxyCODONE    simethicone    Surgical procedures (if appropriate):  Procedure(s):  ROBOTIC PROSTATECTOMY RADICAL , BLADDER NECK RECONSTRUCTION

## 2022-08-12 NOTE — PLAN OF CARE
Problem: PAIN - ADULT  Goal: Verbalizes/displays adequate comfort level or baseline comfort level  Description: Interventions:  - Encourage patient to monitor pain and request assistance  - Assess pain using appropriate pain scale  - Administer analgesics based on type and severity of pain and evaluate response  - Implement non-pharmacological measures as appropriate and evaluate response  - Consider cultural and social influences on pain and pain management  - Notify physician/advanced practitioner if interventions unsuccessful or patient reports new pain  Outcome: Progressing     Problem: SAFETY ADULT  Goal: Patient will remain free of falls  Description: INTERVENTIONS:  - Educate patient/family on patient safety including physical limitations  - Instruct patient to call for assistance with activity   - Consult OT/PT to assist with strengthening/mobility   - Keep Call bell within reach  - Keep bed low and locked with side rails adjusted as appropriate  - Keep care items and personal belongings within reach  - Initiate and maintain comfort rounds  - Make Fall Risk Sign visible to staff  - Offer Toileting every 2 Hours, in advance of need  - Initiate/Maintain bed alarm  - Obtain necessary fall risk management equipment: walker  - Apply yellow socks and bracelet for high fall risk patients  - Consider moving patient to room near nurses station  Outcome: Progressing     Problem: Knowledge Deficit  Goal: Patient/family/caregiver demonstrates understanding of disease process, treatment plan, medications, and discharge instructions  Description: Complete learning assessment and assess knowledge base    Interventions:  - Provide teaching at level of understanding  - Provide teaching via preferred learning methods  Outcome: Progressing

## 2022-08-12 NOTE — PROGRESS NOTES
Progress Note - Urology  Yanira Clark 1960, 60 y o  male MRN: [id number]    Unit/Bed#: E2 -01 Encounter: [id number]    * Prostate cancer ([facility] [address] )  Assessment & Plan  · POD #1 RALP and bladder neck reconstruction  · VSS over night   · Pain well controlled and tolerating clear liquid diet  · Will advance to surgical soft and decrease LR to 75 mL/hr  · Abdomen mildly distended reporting some constipation, has not passed gas  · Bowel sounds normal  · Continue Senokot daily  · CLARE output 90 cc overnight, draining small amounts of serosanguinous drainage  · Malave catheter draining clear yellow urine to gravity  · Continues to report bladder spasms, currently receiving oxybutynin PRN  · Creatinine 1 14 today, post-operatively was 1 49   · Improved with hydration  Will restart  Irbesartan tomorrow  · WBC 9 25, Hgb 13 5  · Encourage ambulation and continued use of incentive spirometry  · If patient is able to continue to tolerate his diet, pain is well controlled, and is able to pass gas  Will reassess this afternoon for possible discharge this evening  Subjective: Patient resting in bed comfortably  He continues to complain of some bladder spasms due to the malave catheter  Otherwise his pain is well controlled  Denies any fever, chills, or gross hematuria  Review of Systems   Constitutional: Negative for chills and fever  HENT: Negative for congestion and sore throat  Respiratory: Negative for cough, chest tightness and shortness of breath  Cardiovascular: Negative for chest pain and leg swelling  Gastrointestinal: Positive for abdominal distention and constipation  Negative for abdominal pain, diarrhea, nausea and vomiting  LLQ CLARE drain   Genitourinary: Negative for decreased urine volume, difficulty urinating, dysuria, flank pain, frequency, hematuria, penile pain and urgency  Bladder spasms   Musculoskeletal: Negative for back pain and gait problem     Skin: Negative for wound  Allergic/Immunologic: Negative for immunocompromised state  Neurological: Negative for dizziness, tremors, weakness and numbness  Hematological: Does not bruise/bleed easily  Objective:  Nursing Rounds:   Vitals: Blood pressure 123/75, pulse 83, temperature 97 8 °F (36 6 °C), temperature source Temporal, resp  rate 20, weight 107 kg (234 lb 12 6 oz), SpO2 96 %  ,Body mass index is 34 67 kg/m²  Physical Exam  Vitals reviewed  Constitutional:       Appearance: Normal appearance  HENT:      Head: Normocephalic and atraumatic  Nose: Nose normal       Mouth/Throat:      Mouth: Mucous membranes are dry  Pharynx: Oropharynx is clear  Eyes:      General: No scleral icterus  Extraocular Movements: Extraocular movements intact  Conjunctiva/sclera: Conjunctivae normal    Cardiovascular:      Rate and Rhythm: Normal rate and regular rhythm  Pulses: Normal pulses  Heart sounds: Normal heart sounds  Pulmonary:      Effort: Pulmonary effort is normal       Breath sounds: Normal breath sounds  Abdominal:      General: Bowel sounds are normal  There is distension  Tenderness: There is abdominal tenderness (normal expected post-operative tenderness)  There is no right CVA tenderness or left CVA tenderness  Hernia: No hernia is present  Comments: LLQ CLARE drain, draining small serosanguinous output   Genitourinary:     Penis: Normal        Testes: Normal       Comments: Ramachandran catheter draining clear yellow urine to gravity  Musculoskeletal:         General: Normal range of motion  Cervical back: Normal range of motion  No tenderness  Right lower leg: No edema  Left lower leg: No edema  Skin:     General: Skin is warm and dry  Coloration: Skin is not jaundiced or pale  Neurological:      General: No focal deficit present  Mental Status: He is alert and oriented to person, place, and time  Mental status is at baseline        Gait: Gait normal    Psychiatric:         Mood and Affect: Mood normal          Behavior: Behavior normal          Thought Content: Thought content normal          Judgment: Judgment normal          Imaging:    Imaging reviewed - both report and images personally reviewed  Labs:  Recent Labs     08/11/22  1322 08/12/22  0425   WBC 11 77* 9 52       Recent Labs     08/11/22  1322 08/12/22  0425   HGB 14 4 13 5     Recent Labs     08/11/22  1322 08/12/22  0425   HCT 44 5 41 6     Recent Labs     08/11/22  1322 08/12/22  0425   CREATININE 1 49* 1 14         History:    Past Medical History:   Diagnosis Date    Anxiety     Cancer ([facility] [address] ) 6/15/22    Prostatic    GERD (gastroesophageal reflux disease) 2001    HL (hearing loss)     B/L    Hyperlipidemia     Hypertension     Obesity      Social History     Socioeconomic History    Marital status: /Civil Union     Spouse name: None    Number of children: None    Years of education: None    Highest education level: None   Occupational History    None   Tobacco Use    Smoking status: Never Smoker    Smokeless tobacco: Never Used   Vaping Use    Vaping Use: Never used   Substance and Sexual Activity    Alcohol use:  Yes     Alcohol/week: 2 0 standard drinks     Types: 2 Standard drinks or equivalent per week     Comment: 1 x month    Drug use: Not Currently     Types: Marijuana     Comment: College    Sexual activity: Yes     Partners: Female     Birth control/protection: Male Sterilization   Other Topics Concern    None   Social History Narrative    None     Social Determinants of Health     Financial Resource Strain: Not on file   Food Insecurity: Not on file   Transportation Needs: Not on file   Physical Activity: Not on file   Stress: Not on file   Social Connections: Not on file   Intimate Partner Violence: Not on file   Housing Stability: Not on file     Past Surgical History:   Procedure Laterality Date    COLONOSCOPY      [place] BIOPSY OF PROSTATE,NEEDLE,TRANSPERINEAL N/A 2022    Procedure: TRANSPERINEAL MRI FUSION BIOPSY PROSTATE;  Surgeon: Michael Griffith MD;  [facility];  Service: Urology    [place] LAP,PROSTATECTOMY,RADICAL,W/NERVE SPARE,INCL ROBOTIC N/A 2022    Procedure: ROBOTIC PROSTATECTOMY RADICAL , BLADDER NECK RECONSTRUCTION;  Surgeon: Syl Harrell MD;  [facility];  Service: Urology    VARICOSE VEIN SURGERY      VASECTOMY      WISDOM TOOTH EXTRACTION       Family History   Problem Relation Age of Onset    Sickle cell trait Mother     Hypertension Mother     Stroke Mother             Hypertension Father     Heart disease Father     Cardiomyopathy Brother     Sickle cell trait Maternal Grandmother     Sickle cell trait Maternal Grandfather     Hypertension Paternal Grandfather        Diana Lawrence  Date: 2022 Time: 10:22 AM

## 2022-08-12 NOTE — PLAN OF CARE
Problem: PAIN - ADULT  Goal: Verbalizes/displays adequate comfort level or baseline comfort level  Description: Interventions:  - Encourage patient to monitor pain and request assistance  - Assess pain using appropriate pain scale  - Administer analgesics based on type and severity of pain and evaluate response  - Implement non-pharmacological measures as appropriate and evaluate response  - Consider cultural and social influences on pain and pain management  - Notify physician/advanced practitioner if interventions unsuccessful or patient reports new pain  Outcome: Progressing     Problem: INFECTION - ADULT  Goal: Absence or prevention of progression during hospitalization  Description: INTERVENTIONS:  - Assess and monitor for signs and symptoms of infection  - Monitor lab/diagnostic results  - Monitor all insertion sites, i e  indwelling lines, tubes, and drains  - Monitor endotracheal if appropriate and nasal secretions for changes in amount and color  - [place] appropriate cooling/warming therapies per order  - Administer medications as ordered  - Instruct and encourage patient and family to use good hand hygiene technique  - Identify and instruct in appropriate isolation precautions for identified infection/condition  Outcome: Progressing     Problem: SAFETY ADULT  Goal: Patient will remain free of falls  Description: INTERVENTIONS:  - Educate patient/family on patient safety including physical limitations  - Instruct patient to call for assistance with activity   - Consult OT/PT to assist with strengthening/mobility   - Keep Call bell within reach  - Keep bed low and locked with side rails adjusted as appropriate  - Keep care items and personal belongings within reach  - Initiate and maintain comfort rounds  - Make Fall Risk Sign visible to staff  - Offer Toileting every 2 Hours, in advance of need  - Initiate/Maintain bed alarm  - Obtain necessary fall risk management equipment: call bell within reach, skid socks, bed alarm, walker  - Apply yellow socks and bracelet for high fall risk patients  - Consider moving patient to room near nurses station  Outcome: Progressing     Problem: DISCHARGE PLANNING  Goal: Discharge to home or other facility with appropriate resources  Description: INTERVENTIONS:  - Identify barriers to discharge w/patient and caregiver  - Arrange for needed discharge resources and transportation as appropriate  - Identify discharge learning needs (meds, wound care, etc )  - Arrange for interpretive services to assist at discharge as needed  - Refer to Case Management Department for coordinating discharge planning if the patient needs post-hospital services based on physician/advanced practitioner order or complex needs related to functional status, cognitive ability, or social support system  Outcome: Progressing

## 2022-08-12 NOTE — QUICK NOTE
Upon reevaluation of the patient this afternoon, he continues to report doing well  He has minimal pain and is well controlled with tylenol and oxycodone  He was able to ambulate without much pain or discomfort  Both the patient and nursing report that when he gets up to stand, he continues to what appears to be urine around the malave catheter  When he ambulates, the diaper was saturated due to the amount he is leaking  He was able to stand at bedside for me, and upon initial change in position, he started to leak around the malave catheter, and upon standing this became worse  His CLARE drain was emptying by nursing who reports 20cc serosanguineous output  Nursing also reports approximately 1200 cc urine output as well  I was also able to strip/milk the CLARE tubing to prevent clotting  I discussed this with Dr Elise Ash who recommend patient staying 1 additional night for observation  Plan:    · IV fluids discontinued  · Repeat CBC and BMP in am  · Continue to hold Lovenox, ensuring he has SCDs when in bed or sitting up in chair    · Continue to ambulate  · Will keep his diet as surgical soft  · Malave catheter to remain in  · CLARE drain to remain in and removed upon discharge  · Ditropan XL changed to 5 mg TID standing  · Reevaluate in am for possible discharge        ANDRES Mallory

## 2022-08-12 NOTE — ASSESSMENT & PLAN NOTE
Prostate Cancer  POD#2 RALP with BN reconstruction    Last 12 hours:  UOP 1850cc  CLARE drain 50cc  Creatinine 1 16  HGB 12 6    Pericatheter leakage has completely subsided  Passing lot of flatus with resolution of pain and distension  Taking solid diet  Walking laps    Discharge home today  Rx: colace, tylenol, ditropan    Discussed with Dr Taryn Woods

## 2022-08-13 VITALS
OXYGEN SATURATION: 94 % | RESPIRATION RATE: 17 BRPM | DIASTOLIC BLOOD PRESSURE: 86 MMHG | WEIGHT: 234.79 LBS | TEMPERATURE: 97.5 F | BODY MASS INDEX: 34.67 KG/M2 | HEART RATE: 62 BPM | SYSTOLIC BLOOD PRESSURE: 128 MMHG

## 2022-08-13 LAB
ANION GAP SERPL CALCULATED.3IONS-SCNC: 6 MMOL/L (ref 4–13)
BASOPHILS # BLD AUTO: 0.02 THOUSANDS/ΜL (ref 0–0.1)
BASOPHILS NFR BLD AUTO: 0 % (ref 0–1)
BUN SERPL-MCNC: 11 MG/DL (ref 5–25)
CALCIUM SERPL-MCNC: 8.7 MG/DL (ref 8.3–10.1)
CHLORIDE SERPL-SCNC: 106 MMOL/L (ref 96–108)
CO2 SERPL-SCNC: 30 MMOL/L (ref 21–32)
CREAT SERPL-MCNC: 1.16 MG/DL (ref 0.6–1.3)
EOSINOPHIL # BLD AUTO: 0.06 THOUSAND/ΜL (ref 0–0.61)
EOSINOPHIL NFR BLD AUTO: 1 % (ref 0–6)
ERYTHROCYTE [DISTWIDTH] IN BLOOD BY AUTOMATED COUNT: 13.2 % (ref 11.6–15.1)
GFR SERPL CREATININE-BSD FRML MDRD: 66 ML/MIN/1.73SQ M
GLUCOSE P FAST SERPL-MCNC: 109 MG/DL (ref 65–99)
GLUCOSE SERPL-MCNC: 109 MG/DL (ref 65–140)
HCT VFR BLD AUTO: 38.4 % (ref 36.5–49.3)
HGB BLD-MCNC: 12.6 G/DL (ref 12–17)
IMM GRANULOCYTES # BLD AUTO: 0.05 THOUSAND/UL (ref 0–0.2)
IMM GRANULOCYTES NFR BLD AUTO: 1 % (ref 0–2)
LYMPHOCYTES # BLD AUTO: 1.02 THOUSANDS/ΜL (ref 0.6–4.47)
LYMPHOCYTES NFR BLD AUTO: 13 % (ref 14–44)
MCH RBC QN AUTO: 29.8 PG (ref 26.8–34.3)
MCHC RBC AUTO-ENTMCNC: 32.8 G/DL (ref 31.4–37.4)
MCV RBC AUTO: 91 FL (ref 82–98)
MONOCYTES # BLD AUTO: 0.66 THOUSAND/ΜL (ref 0.17–1.22)
MONOCYTES NFR BLD AUTO: 8 % (ref 4–12)
NEUTROPHILS # BLD AUTO: 6.35 THOUSANDS/ΜL (ref 1.85–7.62)
NEUTS SEG NFR BLD AUTO: 77 % (ref 43–75)
NRBC BLD AUTO-RTO: 0 /100 WBCS
PLATELET # BLD AUTO: 167 THOUSANDS/UL (ref 149–390)
PMV BLD AUTO: 10.4 FL (ref 8.9–12.7)
POTASSIUM SERPL-SCNC: 3.6 MMOL/L (ref 3.5–5.3)
RBC # BLD AUTO: 4.23 MILLION/UL (ref 3.88–5.62)
SODIUM SERPL-SCNC: 142 MMOL/L (ref 135–147)
WBC # BLD AUTO: 8.16 THOUSAND/UL (ref 4.31–10.16)

## 2022-08-13 PROCEDURE — 99024 POSTOP FOLLOW-UP VISIT: CPT | Performed by: PHYSICIAN ASSISTANT

## 2022-08-13 PROCEDURE — 85025 COMPLETE CBC W/AUTO DIFF WBC: CPT

## 2022-08-13 PROCEDURE — 80048 BASIC METABOLIC PNL TOTAL CA: CPT

## 2022-08-13 PROCEDURE — NC001 PR NO CHARGE: Performed by: PHYSICIAN ASSISTANT

## 2022-08-13 RX ORDER — KETOROLAC TROMETHAMINE 30 MG/ML
15 INJECTION, SOLUTION INTRAMUSCULAR; INTRAVENOUS EVERY 6 HOURS PRN
Status: DISCONTINUED | OUTPATIENT
Start: 2022-08-13 | End: 2022-08-13 | Stop reason: HOSPADM

## 2022-08-13 RX ORDER — DOCUSATE SODIUM 100 MG/1
100 CAPSULE, LIQUID FILLED ORAL 2 TIMES DAILY
Qty: 30 CAPSULE | Refills: 0 | Status: SHIPPED | OUTPATIENT
Start: 2022-08-13

## 2022-08-13 RX ORDER — OXYBUTYNIN CHLORIDE 5 MG/1
5 TABLET ORAL EVERY 8 HOURS PRN
Qty: 30 TABLET | Refills: 0 | Status: SHIPPED | OUTPATIENT
Start: 2022-08-13 | End: 2022-08-29

## 2022-08-13 RX ORDER — ACETAMINOPHEN 325 MG/1
650 TABLET ORAL EVERY 6 HOURS PRN
Qty: 30 TABLET | Refills: 0 | Status: SHIPPED | OUTPATIENT
Start: 2022-08-13 | End: 2022-09-07 | Stop reason: ALTCHOICE

## 2022-08-13 RX ORDER — CEFUROXIME AXETIL 500 MG/1
TABLET ORAL
Qty: 6 TABLET | Refills: 0 | Status: SHIPPED | OUTPATIENT
Start: 2022-08-28 | End: 2022-08-25

## 2022-08-13 RX ADMIN — SENNOSIDES 8.6 MG: 8.6 TABLET, FILM COATED ORAL at 08:33

## 2022-08-13 RX ADMIN — SIMETHICONE 80 MG: 80 TABLET, CHEWABLE ORAL at 04:12

## 2022-08-13 RX ADMIN — ACETAMINOPHEN 325MG 650 MG: 325 TABLET ORAL at 04:09

## 2022-08-13 RX ADMIN — OXYBUTYNIN CHLORIDE 5 MG: 5 TABLET ORAL at 08:33

## 2022-08-13 RX ADMIN — KETOROLAC TROMETHAMINE 15 MG: 30 INJECTION, SOLUTION INTRAMUSCULAR; INTRAVENOUS at 08:39

## 2022-08-13 NOTE — DISCHARGE INSTR - AVS FIRST PAGE
- You have a Ramachandran bladder catheter which is draining urine and allowing your prostate to heal following surgery  - Please continue to care for this catheter as you have been instructed by the nurses at the hospital  - Is normal to see some blood in your urine  It is also normal to on occasion have urine draining around the tubing of the catheter  - Please stick with light activity for 4 weeks including no shoveling or lawn mowing or lifting/squatting  - Please call for problems with her catheter or if the catheter were to stop draining, poorly controlled pain, fever greater than 101, or any other concerns that you feel warrants medical attention  - You will be scheduled for a follow-up appointment at Dr Gerardo Soto for Ramachandran catheter removal and postop check, pathology discussion in 2 weeks  - Do not do any kegel exercises while the catheter is in place  Will resume exercises end of the month      Medications  Colace (stool softener)  Oxybutynin (bladder spasms) stop medication at least 24 hrs before office visit for catheter removal  Ceftin (antibiotic- start the day before catheter removal appointment)

## 2022-08-13 NOTE — PLAN OF CARE
Problem: PAIN - ADULT  Goal: Verbalizes/displays adequate comfort level or baseline comfort level  Description: Interventions:  - Encourage patient to monitor pain and request assistance  - Assess pain using appropriate pain scale  - Administer analgesics based on type and severity of pain and evaluate response  - Implement non-pharmacological measures as appropriate and evaluate response  - Consider cultural and social influences on pain and pain management  - Notify physician/advanced practitioner if interventions unsuccessful or patient reports new pain  Outcome: Progressing     Problem: INFECTION - ADULT  Goal: Absence or prevention of progression during hospitalization  Description: INTERVENTIONS:  - Assess and monitor for signs and symptoms of infection  - Monitor lab/diagnostic results  - Monitor all insertion sites, i e  indwelling lines, tubes, and drains  - Monitor endotracheal if appropriate and nasal secretions for changes in amount and color  - [place] appropriate cooling/warming therapies per order  - Administer medications as ordered  - Instruct and encourage patient and family to use good hand hygiene technique  - Identify and instruct in appropriate isolation precautions for identified infection/condition  Outcome: Progressing  Goal: Absence of fever/infection during neutropenic period  Description: INTERVENTIONS:  - Monitor WBC    Outcome: Progressing     Problem: SAFETY ADULT  Goal: Patient will remain free of falls  Description: INTERVENTIONS:  - Educate patient/family on patient safety including physical limitations  - Instruct patient to call for assistance with activity   - Consult OT/PT to assist with strengthening/mobility   - Keep Call bell within reach  - Keep bed low and locked with side rails adjusted as appropriate  - Keep care items and personal belongings within reach  - Initiate and maintain comfort rounds  - Make Fall Risk Sign visible to staff  - Offer Toileting every 2 Hours, in advance of need  - Initiate/Maintain bed alarm  - Obtain necessary fall risk management equipment  - Apply yellow socks and bracelet for high fall risk patients  - Consider moving patient to room near nurses station  Outcome: Progressing  Goal: Maintain or return to baseline ADL function  Description: INTERVENTIONS:  -  Assess patient's ability to carry out ADLs; assess patient's baseline for ADL function and identify physical deficits which impact ability to perform ADLs (bathing, care of mouth/teeth, toileting, grooming, dressing, etc )  - Assess/evaluate cause of self-care deficits   - Assess range of motion  - Assess patient's mobility; develop plan if impaired  - Assess patient's need for assistive devices and provide as appropriate  - Encourage maximum independence but intervene and supervise when necessary  - Involve family in performance of ADLs  - Assess for home care needs following discharge   - Consider OT consult to assist with ADL evaluation and planning for discharge  - Provide patient education as appropriate  Outcome: Progressing  Goal: Maintains/Returns to pre admission functional level  Description: INTERVENTIONS:  - Perform BMAT or MOVE assessment daily    - Set and communicate daily mobility goal to care team and patient/family/caregiver  - Collaborate with rehabilitation services on mobility goals if consulted  - Perform Range of Motion 2 times a day  - Reposition patient every 2 hours    - Dangle patient 2 times a day  - Stand patient 2 times a day  - Ambulate patient 2 times a day  - Out of bed to chair 2 times a day   - Out of bed for meals 2 times a day  - Out of bed for toileting  - Record patient progress and toleration of activity level   Outcome: Progressing     Problem: DISCHARGE PLANNING  Goal: Discharge to home or other facility with appropriate resources  Description: INTERVENTIONS:  - Identify barriers to discharge w/patient and caregiver  - Arrange for needed discharge resources and transportation as appropriate  - Identify discharge learning needs (meds, wound care, etc )  - Arrange for interpretive services to assist at discharge as needed  - Refer to Case Management Department for coordinating discharge planning if the patient needs post-hospital services based on physician/advanced practitioner order or complex needs related to functional status, cognitive ability, or social support system  Outcome: Progressing     Problem: Knowledge Deficit  Goal: Patient/family/caregiver demonstrates understanding of disease process, treatment plan, medications, and discharge instructions  Description: Complete learning assessment and assess knowledge base    Interventions:  - Provide teaching at level of understanding  - Provide teaching via preferred learning methods  Outcome: Progressing     Problem: GENITOURINARY - ADULT  Goal: Maintains or returns to baseline urinary function  Description: INTERVENTIONS:  - Assess urinary function  - Encourage oral fluids to ensure adequate hydration if ordered  - Administer IV fluids as ordered to ensure adequate hydration  - Administer ordered medications as needed  - Offer frequent toileting  - Follow urinary retention protocol if ordered  Outcome: Progressing  Goal: Absence of urinary retention  Description: INTERVENTIONS:  - Assess patients ability to void and empty bladder  - Monitor I/O  - Bladder scan as needed  - Discuss with physician/AP medications to alleviate retention as needed  - Discuss catheterization for long term situations as appropriate  Outcome: Progressing  Goal: Urinary catheter remains patent  Description: INTERVENTIONS:  - Assess patency of urinary catheter  - If patient has a chronic malave, consider changing catheter if non-functioning  - Follow guidelines for intermittent irrigation of non-functioning urinary catheter  Outcome: Progressing     Problem: Potential for Falls  Goal: Patient will remain free of falls  Description: INTERVENTIONS:  - Educate patient/family on patient safety including physical limitations  - Instruct patient to call for assistance with activity   - Consult OT/PT to assist with strengthening/mobility   - Keep Call bell within reach  - Keep bed low and locked with side rails adjusted as appropriate  - Keep care items and personal belongings within reach  - Initiate and maintain comfort rounds  - Make Fall Risk Sign visible to staff  - Offer Toileting every 2 Hours, in advance of need  - Initiate/Maintain bed alarm  - Obtain necessary fall risk management equipment  - Apply yellow socks and bracelet for high fall risk patients  - Consider moving patient to room near nurses station  Outcome: Progressing     Problem: MOBILITY - ADULT  Goal: Maintain or return to baseline ADL function  Description: INTERVENTIONS:  -  Assess patient's ability to carry out ADLs; assess patient's baseline for ADL function and identify physical deficits which impact ability to perform ADLs (bathing, care of mouth/teeth, toileting, grooming, dressing, etc )  - Assess/evaluate cause of self-care deficits   - Assess range of motion  - Assess patient's mobility; develop plan if impaired  - Assess patient's need for assistive devices and provide as appropriate  - Encourage maximum independence but intervene and supervise when necessary  - Involve family in performance of ADLs  - Assess for home care needs following discharge   - Consider OT consult to assist with ADL evaluation and planning for discharge  - Provide patient education as appropriate  Outcome: Progressing  Goal: Maintains/Returns to pre admission functional level  Description: INTERVENTIONS:  - Perform BMAT or MOVE assessment daily    - Set and communicate daily mobility goal to care team and patient/family/caregiver  - Collaborate with rehabilitation services on mobility goals if consulted  - Perform Range of Motion 2 times a day  - Reposition patient every 2 hours    - Dangle patient 2 times a day  - Stand patient 2 times a day  - Ambulate patient 2 times a day  - Out of bed to chair 2 times a day   - Out of bed for meals 2 times a day  - Out of bed for toileting  - Record patient progress and toleration of activity level   Outcome: Progressing     Problem: Prexisting or High Potential for Compromised Skin Integrity  Goal: Skin integrity is maintained or improved  Description: INTERVENTIONS:  - Identify patients at risk for skin breakdown  - Assess and monitor skin integrity  - Assess and monitor nutrition and hydration status  - Monitor labs   - Assess for incontinence   - Turn and reposition patient  - Assist with mobility/ambulation  - Relieve pressure over bony prominences  - Avoid friction and shearing  - Provide appropriate hygiene as needed including keeping skin clean and dry  - Evaluate need for skin moisturizer/barrier cream  - Collaborate with interdisciplinary team   - Patient/family teaching  - Consider wound care consult   Outcome: Progressing

## 2022-08-13 NOTE — NURSING NOTE
AVS reviewed with patient and spouse  Patient discharged via wheelchair accompanied by spouse, daughter, son, and RN with all belongings

## 2022-08-13 NOTE — PROGRESS NOTES
Progress Note - Urology  Jessica Inman 1960, 60 y o  male MRN: [id number]    Unit/Bed#: E2 -01 Encounter: [id number]      * Prostate cancer [facility])  Assessment & Plan  Prostate Cancer  POD#2 RALP with BN reconstruction    Last 12 hours:  UOP 1850cc  CLARE drain 50cc  Creatinine 1 16  HGB 12 6    Pericatheter leakage has completely subsided  Passing lot of flatus with resolution of pain and distension  Taking solid diet  Walking laps    Discharge home today  Rx: colace, tylenol, ditropan    Discussed with Dr Mahogany Jacome  Subjective: passed lot of gas overnight with immediate relief pain/distension  Walking laps this morning  No further catheter leakage  Feels ready for home  Review of Systems   Constitutional: Negative for activity change, appetite change, chills and fever  Respiratory: Negative for cough and shortness of breath  Cardiovascular: Negative for chest pain  Gastrointestinal: Positive for abdominal distention  Negative for abdominal pain, constipation, diarrhea, nausea and vomiting  Genitourinary: Negative for decreased urine volume, difficulty urinating, dysuria, frequency, hematuria, penile pain, penile swelling, scrotal swelling and urgency  Musculoskeletal: Negative for back pain  Skin: Negative for rash and wound  Hematological: Does not bruise/bleed easily  Objective:  Vitals: Blood pressure 128/86, pulse 62, temperature 97 5 °F (36 4 °C), temperature source Temporal, resp  rate 17, weight 107 kg (234 lb 12 6 oz), SpO2 94 %  ,Body mass index is 34 67 kg/m²  Intake/Output Summary (Last 24 hours) at 8/13/2022 1013  Last data filed at 8/13/2022 0419  Gross per 24 hour   Intake 1200 ml   Output 6490 ml   Net -5290 ml     Invasive Devices  Report    Peripheral Intravenous Line  Duration           Peripheral IV 08/11/22 Dorsal (posterior); Left Forearm 2 days    Peripheral IV 08/11/22 Right Hand 2 days          Drain  Duration           Closed/Suction Drain Anterior Abdomen Bulb 19 Fr  1 day    Urethral Catheter Non-latex 20 Fr  1 day                Physical Exam  Vitals and nursing note reviewed  Constitutional:       Appearance: He is well-developed  HENT:      Head: Normocephalic and atraumatic  Cardiovascular:      Rate and Rhythm: Normal rate and regular rhythm  Heart sounds: Normal heart sounds  No murmur heard  Pulmonary:      Effort: Pulmonary effort is normal       Breath sounds: Normal breath sounds  Abdominal:      General: There is distension (mild distension active bowel sounds, nontender  incisions intact with skin glue)  Palpations: Abdomen is soft  Tenderness: There is no abdominal tenderness  There is no right CVA tenderness, left CVA tenderness, guarding or rebound  Comments: LLQ smith drain with serosanguinous output   Genitourinary:     Penis: Normal        Testes: Normal       Comments: Circumcised penis normal phallus malave catheter per urethra draining clear yellow urine to gravity bag  No cely-catheter leakage today with multiple position changes  Scrotum testes normal no edema, left varicocele  Musculoskeletal:         General: Normal range of motion  Right lower leg: No edema  Left lower leg: No edema  Skin:     General: Skin is warm and dry  Capillary Refill: Capillary refill takes less than 2 seconds  Coloration: Skin is not pale  Neurological:      Mental Status: He is alert and oriented to person, place, and time           Labs:  Recent Labs     08/11/22  1322 08/12/22  0425 08/13/22  0423   WBC 11 77* 9 52 8 16     Recent Labs     08/11/22  1322 08/12/22  0425 08/13/22  0423   HGB 14 4 13 5 12 6       Recent Labs     08/11/22  1322 08/12/22  0425 08/13/22  0423   CREATININE 1 49* 1 14 1 16       History:    Past Medical History:   Diagnosis Date    Anxiety     Cancer ([facility] [address] ) 6/15/22    Prostatic    GERD (gastroesophageal reflux disease) 2001    HL (hearing loss)     B/L    Hyperlipidemia     Hypertension     Obesity      Past Surgical History:   Procedure Laterality Date    COLONOSCOPY      KS BIOPSY OF PROSTATE,NEEDLE,TRANSPERINEAL N/A 2022    Procedure: TRANSPERINEAL MRI FUSION BIOPSY PROSTATE;  Surgeon: Keyla Sewell MD;  [facility];  Service: Urology    KS LAP,PROSTATECTOMY,RADICAL,W/NERVE SPARE,INCL ROBOTIC N/A 2022    Procedure: ROBOTIC PROSTATECTOMY RADICAL , BLADDER NECK RECONSTRUCTION;  Surgeon: Larissa Araujo MD;  Location: [facility];  Service: Urology    VARICOSE VEIN SURGERY      VASECTOMY      WISDOM TOOTH EXTRACTION       Family History   Problem Relation Age of Onset    Sickle cell trait Mother     Hypertension Mother     Stroke Mother             Hypertension Father     Heart disease Father     Cardiomyopathy Brother     Sickle cell trait Maternal Grandmother     Sickle cell trait Maternal Grandfather     Hypertension Paternal Grandfather      Social History     Socioeconomic History    Marital status: /Civil Union     Spouse name: None    Number of children: None    Years of education: None    Highest education level: None   Occupational History    None   Tobacco Use    Smoking status: Never Smoker    Smokeless tobacco: Never Used   Vaping Use    Vaping Use: Never used   Substance and Sexual Activity    Alcohol use:  Yes     Alcohol/week: 2 0 standard drinks     Types: 2 Standard drinks or equivalent per week     Comment: 1 x month    Drug use: Not Currently     Types: Marijuana     Comment: College    Sexual activity: Yes     Partners: Female     Birth control/protection: Male Sterilization   Other Topics Concern    None   Social History Narrative    None     Social Determinants of Health     Financial Resource Strain: Not on file   Food Insecurity: Not on file   Transportation Needs: Not on file   Physical Activity: Not on file   Stress: Not on file   Social Connections: Not on file   Intimate Partner Violence: Not on file   Housing Stability: Not on file         Jim Toure  Date: 8/13/2022 Time: 10:13 AM

## 2022-08-13 NOTE — DISCHARGE SUMMARY
Discharge Summary - Eduardo Rainey 60 y o  male MRN: [id number]    Unit/Bed#: E2 -01 Encounter: [id number]    Admission Date: 8/11/2022     Discharge Date: 08/13/22    HPI: Eduardo Rainey is a 60 y o  male who presented for RALP by Dr Amaris Shannon  Procedure(s):  ROBOTIC PROSTATECTOMY RADICAL , BLADDER NECK RECONSTRUCTION  Surgeon(s):  MD David Batista PA-C  8/11/2022    Hospital Course: Mr Anival Cisse presented for planned RALP for management of favorable intermediate Pamela 3+4=7 prostate cancer  No intraoperative complications, a bladder neck reconstruction was performed see op note for details  Monitored postoperatively on med surg unit, advanced from clears to solid diet by day of discharge, tolerating well with regular flatus and excellent pain control with tylenol alone  Ambulating regularly, with minimal CLARE drain output and this was removed  Initially there was concern for anastomotic leak due to a lot of pericatheter fluid leakage, however this resolved by POD#2 with the addition of ditropan  He is appropriate for discharge to home on this date to continue recovery there, short and longterm recovery expectations and instruction reviewed with him and his wife at bedside including prescriptions and upcoming appt in 2 weeks for malave removal/path review  Discharge Diagnosis: Prostate cancer ([facility] [address] )    Condition at Discharge: good    Discharge Medications:  See after visit summary for reconciled discharge medications provided to patient and family  Patient was discharged home on home medications with the addition of colace, ditropan, tylenol  Discharge instructions/Information to patient and family:   See after visit summary for written and verbal information which has been provided to patient and family  Provisions for Follow-Up Care:  See after visit summary for information related to follow-up care and any pertinent home health orders        Disposition: Home    Planned Readmission: No    Discharge Statement   I spent 30 minutes discharging the patient  This time was spent on the day of discharge  I had direct contact with the patient on the day of discharge  Additional documentation is required if more than 30 minutes were spent on discharge  Signature:    Octavio Bender PA-C  Date: 8/13/2022 Time: 10:25 AM

## 2022-08-13 NOTE — DISCHARGE INSTRUCTIONS
Robot Assisted Laparoscopic Prostatectomy   WHAT YOU NEED TO KNOW:   Robot-assisted laparoscopic prostatectomy (RALP) is surgery to remove your prostate gland through small incisions in your abdomen  RALP is done with a machine that is controlled by your surgeon  The machine has mechanical arms that use small tools to remove your prostate  DISCHARGE INSTRUCTIONS:   Call your local emergency number (911 in the [address] [facility]) for any of the following: You have chest pain when you take a deep breath or cough  You cough up blood  You suddenly feel lightheaded and short of breath  Call your surgeon or uro-oncologist if:   Your arm or leg feels warm, tender, and painful  It may look swollen and red  You have more blood in your urine than you were told to expect, or you pass a blood clot  You have an upset stomach or are vomiting  You have painful swelling in your abdomen that does not go away  You have a fever  Your pain is getting worse, even with medicine  You have an incision that is red, swollen, or has pus coming from it  You are urinating less than usual     You have trouble urinating or having a bowel movement  You have questions or concerns about your condition or care  Medicines: You may need any of the following:  Prescription pain medicine  may be given  Ask your healthcare provider how to take this medicine safely  Some prescription pain medicines contain acetaminophen  Do not take other medicines that contain acetaminophen without talking to your healthcare provider  Too much acetaminophen may cause liver damage  Prescription pain medicine may cause constipation  Ask your healthcare provider how to prevent or treat constipation  Antibiotics  prevent or fight an infection caused by bacteria  Blood thinners  help prevent blood clots  Clots can cause strokes, heart attacks, and death   The following are general safety guidelines to follow while you are taking a blood thinner:    Watch for bleeding and bruising while you take blood thinners  Watch for bleeding from your gums or nose  Watch for blood in your urine and bowel movements  Use a soft washcloth on your skin, and a soft toothbrush to brush your teeth  This can keep your skin and gums from bleeding  If you shave, use an electric shaver  Do not play contact sports  Tell your dentist and other healthcare providers that you take a blood thinner  Wear a bracelet or necklace that says you take this medicine  Do not start or stop any other medicines unless your healthcare provider tells you to  Many medicines cannot be used with blood thinners  Take your blood thinner exactly as prescribed by your healthcare provider  Do not skip does or take less than prescribed  Tell your provider right away if you forget to take your blood thinner, or if you take too much  Warfarin  is a blood thinner that you may need to take  The following are things you should be aware of if you take warfarin:     Foods and medicines can affect the amount of warfarin in your blood  Do not make major changes to your diet while you take warfarin  Warfarin works best when you eat about the same amount of vitamin K every day  Vitamin K is found in green leafy vegetables and certain other foods  Ask for more information about what to eat when you are taking warfarin  You will need to see your healthcare provider for follow-up visits when you are on warfarin  You will need regular blood tests  These tests are used to decide how much medicine you need  Take your medicine as directed  Contact your healthcare provider if you think your medicine is not helping or if you have side effects  Tell him or her if you are allergic to any medicine  Keep a list of the medicines, vitamins, and herbs you take  Include the amounts, and when and why you take them  Bring the list or the pill bottles to follow-up visits   Carry your medicine list with you in case of an emergency  Surgery area care: Follow your surgeon's directions on how to care for the area  Ask when you can start showering or bathing  You may need to keep the area covered so it does not get wet  Ramachandran catheter care:  Keep the bag below your waist  If the bag is too high, urine will flow back into your bladder  This can cause an infection  Do not pull on the catheter  This may cause pain and bleeding, and the catheter may come out  Do not let the catheter tubing kink or twist  A kink or twist will block the flow of urine  Bladder control:  After surgery, you may leak urine and have trouble controlling when you urinate  The following can help decrease or manage urine leakage:  Do not have caffeine  Caffeine can cause problems with bladder control and increase your need to urinate  Limit liquids  Drink smaller amounts of liquid throughout the day  Do not drink before bedtime  Ask if you should decrease the amount of liquid you drink each day  This may help you control your bladder  Wear a pad or adult diapers, if needed  These may help to absorb leaking urine and decrease odor  Do pelvic floor muscle exercises  Pelvic floor muscle exercises, also called Kegels, may help improve your bladder control  These exercises are done by tightening and relaxing your pelvic muscles  Ask how to do pelvic floor muscle exercises, and how often to do them  Self-care:   Rest as needed  You may feel like resting more after surgery  Slowly start to do more each day  Ask when it is okay to return to your normal daily activities  You may need to wait 4 to 6 weeks after surgery  Your healthcare provider will tell you about the activities you should avoid after your surgery  These may include driving while you are taking pain medicines or until your catheter is removed  You may also be given a weight restriction on lifting objects  Walk when your healthcare provider says more activity is okay  Walking is an important activity to help with your recovery after surgery  Walking is a good way to improve your overall health and help you recover sooner  It also helps keep your blood flowing and reduces the risk of blood clots  Other types of exercise can also be an important part of your recovery  Ask about the exercises that are safe for you  Ask when it is okay to start having sex again  You may have trouble having an erection  Your erections may return with time  Medicines and mechanical aids may help  Talk to your healthcare provider about these options  Follow up with your surgeon or uro-oncologist in 1 week or as directed: You will need your urinary catheter removed  Tests will show if any cancer remains in your body after surgery  Write down your questions so you remember to ask them during your visits  © Copyright [facility] 2022 Information is for End User's use only and may not be sold, redistributed or otherwise used for commercial purposes  All illustrations and images included in CareNotes® are the copyrighted property of A D A M , Inc  or [facility] [employer]or   The above information is an  only  It is not intended as medical advice for individual conditions or treatments  Talk to your doctor, nurse or pharmacist before following any medical regimen to see if it is safe and effective for you

## 2022-08-13 NOTE — PLAN OF CARE
Problem: PAIN - ADULT  Goal: Verbalizes/displays adequate comfort level or baseline comfort level  Description: Interventions:  - Encourage patient to monitor pain and request assistance  - Assess pain using appropriate pain scale  - Administer analgesics based on type and severity of pain and evaluate response  - Implement non-pharmacological measures as appropriate and evaluate response  - Consider cultural and social influences on pain and pain management  - Notify physician/advanced practitioner if interventions unsuccessful or patient reports new pain  Outcome: Progressing     Problem: INFECTION - ADULT  Goal: Absence or prevention of progression during hospitalization  Description: INTERVENTIONS:  - Assess and monitor for signs and symptoms of infection  - Monitor lab/diagnostic results  - Monitor all insertion sites, i e  indwelling lines, tubes, and drains  - Monitor endotracheal if appropriate and nasal secretions for changes in amount and color  - [place] appropriate cooling/warming therapies per order  - Administer medications as ordered  - Instruct and encourage patient and family to use good hand hygiene technique  - Identify and instruct in appropriate isolation precautions for identified infection/condition  Outcome: Progressing     Problem: GENITOURINARY - ADULT  Goal: Maintains or returns to baseline urinary function  Description: INTERVENTIONS:  - Assess urinary function  - Encourage oral fluids to ensure adequate hydration if ordered  - Administer IV fluids as ordered to ensure adequate hydration  - Administer ordered medications as needed  - Offer frequent toileting  - Follow urinary retention protocol if ordered  Outcome: Progressing     Problem: GENITOURINARY - ADULT  Goal: Absence of urinary retention  Description: INTERVENTIONS:  - Assess patients ability to void and empty bladder  - Monitor I/O  - Bladder scan as needed  - Discuss with physician/AP medications to alleviate retention as needed  - Discuss catheterization for long term situations as appropriate  Outcome: Progressing     Problem: GENITOURINARY - ADULT  Goal: Urinary catheter remains patent  Description: INTERVENTIONS:  - Assess patency of urinary catheter  - If patient has a chronic malave, consider changing catheter if non-functioning  - Follow guidelines for intermittent irrigation of non-functioning urinary catheter  Outcome: Progressing     Problem: MOBILITY - ADULT  Goal: Maintain or return to baseline ADL function  Description: INTERVENTIONS:  -  Assess patient's ability to carry out ADLs; assess patient's baseline for ADL function and identify physical deficits which impact ability to perform ADLs (bathing, care of mouth/teeth, toileting, grooming, dressing, etc )  - Assess/evaluate cause of self-care deficits   - Assess range of motion  - Assess patient's mobility; develop plan if impaired  - Assess patient's need for assistive devices and provide as appropriate  - Encourage maximum independence but intervene and supervise when necessary  - Involve family in performance of ADLs  - Assess for home care needs following discharge   - Consider OT consult to assist with ADL evaluation and planning for discharge  - Provide patient education as appropriate  Outcome: Progressing     Problem: Prexisting or High Potential for Compromised Skin Integrity  Goal: Skin integrity is maintained or improved  Description: INTERVENTIONS:  - Identify patients at risk for skin breakdown  - Assess and monitor skin integrity  - Assess and monitor nutrition and hydration status  - Monitor labs   - Assess for incontinence   - Turn and reposition patient  - Assist with mobility/ambulation  - Relieve pressure over bony prominences  - Avoid friction and shearing  - Provide appropriate hygiene as needed including keeping skin clean and dry  - Evaluate need for skin moisturizer/barrier cream  - Collaborate with interdisciplinary team   - Patient/family teaching  - Consider wound care consult   Outcome: Progressing

## 2022-08-15 ENCOUNTER — TELEPHONE (OUTPATIENT)
Dept: UROLOGY | Facility: CLINIC | Age: 62
End: 2022-08-15

## 2022-08-15 DIAGNOSIS — C61 PROSTATE CANCER (HCC): Primary | ICD-10-CM

## 2022-08-15 RX ORDER — KETOROLAC TROMETHAMINE 10 MG/1
10 TABLET, FILM COATED ORAL EVERY 6 HOURS PRN
Qty: 20 TABLET | Refills: 0 | Status: SHIPPED | OUTPATIENT
Start: 2022-08-15 | End: 2022-08-29

## 2022-08-15 NOTE — TELEPHONE ENCOUNTER
----- Message from [employer]  Mary Ann Hogan sent at 8/15/2022  9:39 AM EDT -----  Regarding: Post op issues Octavio Evangelista   Wanted to check in about 3 things   1 still have enough drainage at Kevin site to saturate a 4x4  2 Leaking around malave  Dry when lying down but with standing or getting up still having a little leakage  Just been doing Tylenol 650 for pain , can I start any additional non narcotics?     Thanks   Manny Osman

## 2022-08-15 NOTE — TELEPHONE ENCOUNTER
Drainage should continue to improve at CLARE site  Can keep dressing to protect clothes  Leak around malave seems about where it was around time of discharge   I would like to do formal cystogram prior to plan for malave removal  I will order for 8/25 with plan for me to see 8/29 (possible malave removal then)    Yes, will order toradol and send to [facility]

## 2022-08-15 NOTE — TELEPHONE ENCOUNTER
Patient s/p RALP 8/11/2022 by Dr Unique Cerda  Called the patient who reports the leakage around the malave has improved  Taking the Ditropan as prescribed  Patient states the  site is saturating a 4X4 every 2 hours  Patient asking for Toradol prescription for bedtime  Will send encounter to MD for advice then call patient back

## 2022-08-15 NOTE — TELEPHONE ENCOUNTER
Called the patient to review Dr Fawad Caldera recommendations  Informed MD Easton feels CLARE site drainage should continue to improve  Patient mentions there is less drainage since this morning  Aware of Toradol prescription sent to [facility]  Discussed Dr Kimberli Huang wanting a cystogram prior to 8/29/2022 appointment  Called Central Scheduling, patient scheduled 8/24/2022 at 0930 at [facility]  Patient's wife aware of date and time

## 2022-08-17 LAB
ABO GROUP BLD BPU: NORMAL
ABO GROUP BLD BPU: NORMAL
BPU ID: NORMAL
BPU ID: NORMAL
CROSSMATCH: NORMAL
CROSSMATCH: NORMAL
UNIT DISPENSE STATUS: NORMAL
UNIT DISPENSE STATUS: NORMAL
UNIT PRODUCT CODE: NORMAL
UNIT PRODUCT CODE: NORMAL
UNIT PRODUCT VOLUME: 350 ML
UNIT PRODUCT VOLUME: 350 ML
UNIT RH: NORMAL
UNIT RH: NORMAL

## 2022-08-21 ENCOUNTER — APPOINTMENT (INPATIENT)
Dept: RADIOLOGY | Facility: HOSPITAL | Age: 62
DRG: 690 | End: 2022-08-21
Payer: COMMERCIAL

## 2022-08-21 ENCOUNTER — NURSE TRIAGE (OUTPATIENT)
Dept: OTHER | Facility: OTHER | Age: 62
End: 2022-08-21

## 2022-08-21 ENCOUNTER — HOSPITAL ENCOUNTER (INPATIENT)
Facility: HOSPITAL | Age: 62
LOS: 4 days | Discharge: HOME | DRG: 690 | End: 2022-08-25
Attending: EMERGENCY MEDICINE | Admitting: INTERNAL MEDICINE
Payer: COMMERCIAL

## 2022-08-21 DIAGNOSIS — Z90.79 H/O RADICAL PROSTATECTOMY: Primary | ICD-10-CM

## 2022-08-21 DIAGNOSIS — R50.9 FEVER IN ADULT: ICD-10-CM

## 2022-08-21 LAB
ALBUMIN SERPL BCP-MCNC: 3.3 G/DL (ref 3.5–5)
ALP SERPL-CCNC: 103 U/L (ref 46–116)
ALT SERPL W P-5'-P-CCNC: 35 U/L (ref 12–78)
ANION GAP SERPL CALCULATED.3IONS-SCNC: 4 MMOL/L (ref 4–13)
AST SERPL W P-5'-P-CCNC: 23 U/L (ref 5–45)
BACTERIA UR QL AUTO: ABNORMAL /HPF
BASOPHILS # BLD AUTO: 0.02 THOUSANDS/ΜL (ref 0–0.1)
BASOPHILS NFR BLD AUTO: 0 % (ref 0–1)
BILIRUB SERPL-MCNC: 0.77 MG/DL (ref 0.2–1)
BILIRUB UR QL STRIP: NEGATIVE
BUN SERPL-MCNC: 15 MG/DL (ref 5–25)
CALCIUM ALBUM COR SERPL-MCNC: 9.4 MG/DL (ref 8.3–10.1)
CALCIUM SERPL-MCNC: 8.8 MG/DL (ref 8.3–10.1)
CHLORIDE SERPL-SCNC: 106 MMOL/L (ref 96–108)
CLARITY UR: ABNORMAL
CO2 SERPL-SCNC: 25 MMOL/L (ref 21–32)
COLOR UR: ABNORMAL
CREAT SERPL-MCNC: 1.09 MG/DL (ref 0.6–1.3)
EOSINOPHIL # BLD AUTO: 0.14 THOUSAND/ΜL (ref 0–0.61)
EOSINOPHIL NFR BLD AUTO: 2 % (ref 0–6)
ERYTHROCYTE [DISTWIDTH] IN BLOOD BY AUTOMATED COUNT: 12.9 % (ref 11.6–15.1)
GFR SERPL CREATININE-BSD FRML MDRD: 71 ML/MIN/1.73SQ M
GLUCOSE SERPL-MCNC: 105 MG/DL (ref 65–140)
GLUCOSE UR STRIP-MCNC: NEGATIVE MG/DL
HCT VFR BLD AUTO: 40.6 % (ref 36.5–49.3)
HGB BLD-MCNC: 13.4 G/DL (ref 12–17)
HGB UR QL STRIP.AUTO: ABNORMAL
IMM GRANULOCYTES # BLD AUTO: 0.03 THOUSAND/UL (ref 0–0.2)
IMM GRANULOCYTES NFR BLD AUTO: 0 % (ref 0–2)
KETONES UR STRIP-MCNC: NEGATIVE MG/DL
LACTATE SERPL-SCNC: 0.9 MMOL/L (ref 0.5–2)
LEUKOCYTE ESTERASE UR QL STRIP: ABNORMAL
LYMPHOCYTES # BLD AUTO: 0.96 THOUSANDS/ΜL (ref 0.6–4.47)
LYMPHOCYTES NFR BLD AUTO: 10 % (ref 14–44)
MCH RBC QN AUTO: 28.9 PG (ref 26.8–34.3)
MCHC RBC AUTO-ENTMCNC: 33 G/DL (ref 31.4–37.4)
MCV RBC AUTO: 88 FL (ref 82–98)
MONOCYTES # BLD AUTO: 0.77 THOUSAND/ΜL (ref 0.17–1.22)
MONOCYTES NFR BLD AUTO: 8 % (ref 4–12)
NEUTROPHILS # BLD AUTO: 7.44 THOUSANDS/ΜL (ref 1.85–7.62)
NEUTS SEG NFR BLD AUTO: 80 % (ref 43–75)
NITRITE UR QL STRIP: POSITIVE
NON-SQ EPI CELLS URNS QL MICRO: ABNORMAL /HPF
NRBC BLD AUTO-RTO: 0 /100 WBCS
PH UR STRIP.AUTO: 6 [PH]
PLATELET # BLD AUTO: 227 THOUSANDS/UL (ref 149–390)
PMV BLD AUTO: 10.1 FL (ref 8.9–12.7)
POTASSIUM SERPL-SCNC: 3.9 MMOL/L (ref 3.5–5.3)
PROT SERPL-MCNC: 7 G/DL (ref 6.4–8.4)
PROT UR STRIP-MCNC: ABNORMAL MG/DL
RBC # BLD AUTO: 4.63 MILLION/UL (ref 3.88–5.62)
RBC #/AREA URNS AUTO: ABNORMAL /HPF
SODIUM SERPL-SCNC: 135 MMOL/L (ref 135–147)
SP GR UR STRIP.AUTO: 1.01 (ref 1–1.03)
UROBILINOGEN UR STRIP-ACNC: <2 MG/DL
WBC # BLD AUTO: 9.36 THOUSAND/UL (ref 4.31–10.16)
WBC #/AREA URNS AUTO: ABNORMAL /HPF
WBC CLUMPS # UR AUTO: PRESENT /UL

## 2022-08-21 PROCEDURE — 99285 EMERGENCY DEPT VISIT HI MDM: CPT | Performed by: EMERGENCY MEDICINE

## 2022-08-21 PROCEDURE — 87040 BLOOD CULTURE FOR BACTERIA: CPT | Performed by: INTERNAL MEDICINE

## 2022-08-21 PROCEDURE — 74177 CT ABD & PELVIS W/CONTRAST: CPT

## 2022-08-21 PROCEDURE — 80053 COMPREHEN METABOLIC PANEL: CPT | Performed by: INTERNAL MEDICINE

## 2022-08-21 PROCEDURE — 83605 ASSAY OF LACTIC ACID: CPT | Performed by: INTERNAL MEDICINE

## 2022-08-21 PROCEDURE — 99223 1ST HOSP IP/OBS HIGH 75: CPT | Performed by: INTERNAL MEDICINE

## 2022-08-21 PROCEDURE — 36415 COLL VENOUS BLD VENIPUNCTURE: CPT | Performed by: INTERNAL MEDICINE

## 2022-08-21 PROCEDURE — G1004 CDSM NDSC: HCPCS

## 2022-08-21 PROCEDURE — 85025 COMPLETE CBC W/AUTO DIFF WBC: CPT | Performed by: INTERNAL MEDICINE

## 2022-08-21 PROCEDURE — 87077 CULTURE AEROBIC IDENTIFY: CPT | Performed by: INTERNAL MEDICINE

## 2022-08-21 PROCEDURE — 81001 URINALYSIS AUTO W/SCOPE: CPT | Performed by: INTERNAL MEDICINE

## 2022-08-21 PROCEDURE — 99285 EMERGENCY DEPT VISIT HI MDM: CPT

## 2022-08-21 PROCEDURE — 87086 URINE CULTURE/COLONY COUNT: CPT | Performed by: INTERNAL MEDICINE

## 2022-08-21 PROCEDURE — 87186 SC STD MICRODIL/AGAR DIL: CPT | Performed by: INTERNAL MEDICINE

## 2022-08-21 RX ORDER — FLUTICASONE PROPIONATE 110 UG/1
2 AEROSOL, METERED RESPIRATORY (INHALATION) EVERY 12 HOURS SCHEDULED
Status: DISCONTINUED | OUTPATIENT
Start: 2022-08-21 | End: 2022-08-25 | Stop reason: HOSPADM

## 2022-08-21 RX ORDER — MORPHINE SULFATE 4 MG/ML
4 INJECTION, SOLUTION INTRAMUSCULAR; INTRAVENOUS ONCE
Status: COMPLETED | OUTPATIENT
Start: 2022-08-21 | End: 2022-08-21

## 2022-08-21 RX ORDER — OXYCODONE HYDROCHLORIDE 5 MG/1
2.5 TABLET ORAL EVERY 4 HOURS PRN
Status: DISCONTINUED | OUTPATIENT
Start: 2022-08-21 | End: 2022-08-25 | Stop reason: HOSPADM

## 2022-08-21 RX ORDER — DOCUSATE SODIUM 100 MG/1
100 CAPSULE, LIQUID FILLED ORAL 2 TIMES DAILY PRN
Status: DISCONTINUED | OUTPATIENT
Start: 2022-08-21 | End: 2022-08-25 | Stop reason: HOSPADM

## 2022-08-21 RX ORDER — LOSARTAN POTASSIUM 25 MG/1
25 TABLET ORAL DAILY
Status: DISCONTINUED | OUTPATIENT
Start: 2022-08-22 | End: 2022-08-25 | Stop reason: HOSPADM

## 2022-08-21 RX ORDER — SODIUM CHLORIDE, SODIUM GLUCONATE, SODIUM ACETATE, POTASSIUM CHLORIDE, MAGNESIUM CHLORIDE, SODIUM PHOSPHATE, DIBASIC, AND POTASSIUM PHOSPHATE .53; .5; .37; .037; .03; .012; .00082 G/100ML; G/100ML; G/100ML; G/100ML; G/100ML; G/100ML; G/100ML
125 INJECTION, SOLUTION INTRAVENOUS CONTINUOUS
Status: DISCONTINUED | OUTPATIENT
Start: 2022-08-21 | End: 2022-08-23

## 2022-08-21 RX ORDER — OXYCODONE HYDROCHLORIDE 5 MG/1
5 TABLET ORAL EVERY 4 HOURS PRN
Status: DISCONTINUED | OUTPATIENT
Start: 2022-08-21 | End: 2022-08-25 | Stop reason: HOSPADM

## 2022-08-21 RX ORDER — HYDROMORPHONE HCL IN WATER/PF 6 MG/30 ML
0.2 PATIENT CONTROLLED ANALGESIA SYRINGE INTRAVENOUS EVERY 4 HOURS PRN
Status: DISCONTINUED | OUTPATIENT
Start: 2022-08-21 | End: 2022-08-25 | Stop reason: HOSPADM

## 2022-08-21 RX ORDER — OXYBUTYNIN CHLORIDE 5 MG/1
5 TABLET ORAL EVERY 8 HOURS PRN
Status: DISCONTINUED | OUTPATIENT
Start: 2022-08-21 | End: 2022-08-25 | Stop reason: HOSPADM

## 2022-08-21 RX ORDER — ACETAMINOPHEN 325 MG/1
650 TABLET ORAL EVERY 6 HOURS PRN
Status: DISCONTINUED | OUTPATIENT
Start: 2022-08-21 | End: 2022-08-25 | Stop reason: HOSPADM

## 2022-08-21 RX ORDER — MAGNESIUM HYDROXIDE/ALUMINUM HYDROXICE/SIMETHICONE 120; 1200; 1200 MG/30ML; MG/30ML; MG/30ML
30 SUSPENSION ORAL EVERY 6 HOURS PRN
Status: DISCONTINUED | OUTPATIENT
Start: 2022-08-21 | End: 2022-08-25 | Stop reason: HOSPADM

## 2022-08-21 RX ORDER — ACETAMINOPHEN 325 MG/1
650 TABLET ORAL ONCE
Status: COMPLETED | OUTPATIENT
Start: 2022-08-21 | End: 2022-08-21

## 2022-08-21 RX ORDER — PRAVASTATIN SODIUM 40 MG
40 TABLET ORAL
Status: DISCONTINUED | OUTPATIENT
Start: 2022-08-22 | End: 2022-08-25 | Stop reason: HOSPADM

## 2022-08-21 RX ORDER — ENOXAPARIN SODIUM 100 MG/ML
40 INJECTION SUBCUTANEOUS DAILY
Status: DISCONTINUED | OUTPATIENT
Start: 2022-08-22 | End: 2022-08-25 | Stop reason: HOSPADM

## 2022-08-21 RX ORDER — ONDANSETRON 2 MG/ML
4 INJECTION INTRAMUSCULAR; INTRAVENOUS EVERY 6 HOURS PRN
Status: DISCONTINUED | OUTPATIENT
Start: 2022-08-21 | End: 2022-08-25 | Stop reason: HOSPADM

## 2022-08-21 RX ORDER — MONTELUKAST SODIUM 10 MG/1
10 TABLET ORAL
Status: DISCONTINUED | OUTPATIENT
Start: 2022-08-21 | End: 2022-08-25 | Stop reason: HOSPADM

## 2022-08-21 RX ADMIN — IOHEXOL 70 ML: 350 INJECTION, SOLUTION INTRAVENOUS at 23:26

## 2022-08-21 RX ADMIN — MORPHINE SULFATE 4 MG: 4 INJECTION INTRAVENOUS at 21:08

## 2022-08-21 RX ADMIN — OXYBUTYNIN CHLORIDE 5 MG: 5 TABLET ORAL at 23:37

## 2022-08-21 RX ADMIN — MONTELUKAST 10 MG: 10 TABLET, FILM COATED ORAL at 23:37

## 2022-08-21 RX ADMIN — ACETAMINOPHEN 650 MG: 325 TABLET ORAL at 21:07

## 2022-08-21 NOTE — ED ATTENDING ATTESTATION
8/21/2022  I, Stas Hooper MD, saw and evaluated the patient  I have discussed the patient with the resident/non-physician practitioner and agree with the resident's/non-physician practitioner's findings, Plan of Care, and MDM as documented in the resident's/non-physician practitioner's note, except where noted  All available labs and Radiology studies were reviewed  I was present for key portions of any procedure(s) performed by the resident/non-physician practitioner and I was immediately available to provide assistance  At this point I agree with the current assessment done in the Emergency Department  I have conducted an independent evaluation of this patient a history and physical is as follows:    OA: 61 y/o m with history of prostate cancer s/p radical prostatectomy on 8/11 who presents with lower abdominal pain with fevers/chills, fevers and fatigue that began this afternoon  Also notes that he has had leakage and irritation at the meatus since malave insertion after surgery  Pt measured fever at home 102, took medications at home earlier today but sxms persisted prompting him to call his urologist and come to the ED for further evaluation  Otherwise denies any fevers/chills/n/v but has had decreased inatke secondary to sxms  PE, NAD, febrile to 100 3, NC/AT, MMM, clear sclera/conjunctiva, neck supple/FROM, RR/-murmurs, lungs CTAB, -w/r, abd soft, +BS, + mild ttp over lower abdomen with r/g, incisions appear well healed, no dc/bleeding, no dc form penis, no swelling, very minimal irritation at meatus, - LE edema, - calf ttp, intact distal pulses and capillary refill < 2 sec, AAO  A/p fever and abd pain s/p prostatectomy  eval with labs, imaging, discuss with urology, IVF hydration, pain control and antipyretic  Monitor and treat accordingly      ED Course     Pt admitted to [facility]  CT pending upon admission  Medicine to follow       Critical Care Time  Procedures

## 2022-08-21 NOTE — TELEPHONE ENCOUNTER
Reason for Disposition   Fever > 100 4 F (38 0 C)    Answer Assessment - Initial Assessment Questions  1  SYMPTOM: "What's the main symptom you're concerned about?" (e g , pain, fever, vomiting)      Fever of 100 8 approx 30 minutes ago, severe chills earlier this afternoon  He is taking Tylenol and Toradol around the clock, and still has fever  2  ONSET: "When did this start?"      1pm    3  SURGERY: "What surgery was performed?"      Prostatectomy and bladder neck reconstruction    4  DATE of SURGERY: "When was surgery performed?"       8/11/22    5  PAIN: "Is there any pain?" If Yes, ask: "How bad is it?"  (Scale 1-10; or mild, moderate, severe)      "I'm sore in the taint area, increasing over past 2 days "    6  FEVER: "Do you have a fever?" If Yes, ask: "What is your temperature, how was it measured, and when did it start?"      As noted above     7  VOMITING: "Is there any vomiting?" If yes, ask: "How many times?"      Denies     8  BLEEDING: "Is there any bleeding?" If Yes, ask: "How much?" and "Where?"      Blood tinged urine, small amount of blood at urinary meatus (has malave)    10   OTHER SYMPTOMS: "Do you have any other symptoms?" (e g , drainage from wound, painful urination, constipation)        No appetite at all    Protocols used: POST-OP SYMPTOMS AND QUESTIONS-[facility]

## 2022-08-21 NOTE — ED PROVIDER NOTES
History  Chief Complaint   Patient presents with    Post-op Problem     Pt had radical prostatectomy 10 days ago, today around 1pm started with violent chills and pt temp was 101 at home as well as increased pain of the pelvic area  Pt noted urine in his malave bag appeared a little cloudy as well  Patient is a 26-year-old male with history of prostate cancer status post radical prostatectomy on August 11th who presents with fevers chills  Patient had been recovering well since his procedure, states that his pain was being well managed, had appropriate return of bowel and bladder function  He says that he went on a hour long car ride on Friday, 2 days ago and had a lot of pain after that  Says that today around noon he developed sudden onset fevers, chills and loss of appetite  He tried to take a hot shower when he was feeling cold but did not have any resolution of his symptoms  He took his temperature after that and it was recorded at 101 8° F  Patient called his urologist told to come to [facility] to be evaluated  Currently patient denies any headaches, chest pain, shortness a breath, back pain, or weakness  He also notes that since his procedure he has had leakage of urine around his Malave and he thinks that is cloudy your today  He thinks there is some sediment in the Malave catheter itself  Prior to Admission Medications   Prescriptions Last Dose Informant Patient Reported?  Taking?   acetaminophen (TYLENOL) 325 mg tablet 8/22/2022 at Unknown time  No Yes   Sig: Take 2 tablets (650 mg total) by mouth every 6 (six) hours as needed for mild pain   aspirin (ECOTRIN LOW STRENGTH) 81 mg EC tablet Past Week at Unknown time  Yes Yes   Sig: Take 81 mg by mouth daily   cefuroxime (CEFTIN) 500 mg tablet Not Taking at Unknown time  No No   Sig: Take one tablet with breakfast and dinner starting the day prior to catheter removal, for three days   Patient not taking: Reported on 8/22/2022   docusate sodium (COLACE) 100 mg capsule 8/21/2022 at Unknown time  No Yes   Sig: Take 1 capsule (100 mg total) by mouth 2 (two) times a day   fluticasone (Flovent HFA) 110 MCG/ACT inhaler Not Taking at Unknown time  No No   Sig: Inhale 2 puffs 2 (two) times a day Rinse mouth after use     Patient not taking: Reported on 8/22/2022   irbesartan (AVAPRO) 75 mg tablet Past Week at Unknown time Self No Yes   Sig: Take 1 tablet (75 mg total) by mouth daily   ketorolac (TORADOL) 10 mg tablet Not Taking at Unknown time  No No   Sig: Take 1 tablet (10 mg total) by mouth every 6 (six) hours as needed for moderate pain   Patient not taking: Reported on 8/22/2022   montelukast (SINGULAIR) 10 mg tablet Not Taking at Unknown time Self No No   Sig: Take 1 tablet (10 mg total) by mouth daily at bedtime   Patient not taking: Reported on 8/22/2022   oxybutynin (DITROPAN) 5 mg tablet Past Week at Unknown time  No Yes   Sig: Take 1 tablet (5 mg total) by mouth every 8 (eight) hours as needed (bladder spasm/penile pain)   rosuvastatin (CRESTOR) 5 mg tablet Past Week at Unknown time Self No Yes   Sig: Take 1 tablet (5 mg total) by mouth every other day      Facility-Administered Medications: None       Past Medical History:   Diagnosis Date    Anxiety     Cancer ([facility] [address] ) 6/15/22    Prostatic    GERD (gastroesophageal reflux disease) 2001    HL (hearing loss)     B/L    Hyperlipidemia     Hypertension     Obesity        Past Surgical History:   Procedure Laterality Date    COLONOSCOPY      VT BIOPSY OF PROSTATE,NEEDLE,TRANSPERINEAL N/A 06/08/2022    Procedure: TRANSPERINEAL MRI FUSION BIOPSY PROSTATE;  Surgeon: Milad Allen MD;  [facility];  Service: Urology    VT LAP,PROSTATECTOMY,RADICAL,W/NERVE SPARE,INCL ROBOTIC N/A 8/11/2022    Procedure: ROBOTIC PROSTATECTOMY RADICAL , BLADDER NECK RECONSTRUCTION;  Surgeon: Jose Holt MD;  Location: [facility];  Service: Urology   [place] [id number] SURGERY      VASECTOMY      WISDOM TOOTH EXTRACTION         Family History   Problem Relation Age of Onset    Sickle cell trait Mother     Hypertension Mother     Stroke Mother             Hypertension Father     Heart disease Father     Cardiomyopathy Brother     Sickle cell trait Maternal Grandmother     Sickle cell trait Maternal Grandfather     Hypertension Paternal Grandfather      I have reviewed and agree with the history as documented  E-Cigarette/Vaping    E-Cigarette Use Never User      E-Cigarette/Vaping Substances    Nicotine No     THC No     CBD No     Flavoring No     Other No     Unknown No      Social History     Tobacco Use    Smoking status: Never Smoker    Smokeless tobacco: Never Used   Vaping Use    Vaping Use: Never used   Substance Use Topics    Alcohol use: Yes     Alcohol/week: 2 0 standard drinks     Types: 2 Standard drinks or equivalent per week     Comment: 1 x month    Drug use: Not Currently     Types: Marijuana     Comment: College        Review of Systems   Constitutional: Negative for chills and fever  HENT: Negative for congestion and rhinorrhea  Eyes: Negative for pain and redness  Respiratory: Negative for cough and shortness of breath  Cardiovascular: Negative for chest pain, palpitations and leg swelling  Gastrointestinal: Negative for abdominal distention, abdominal pain, blood in stool, constipation, diarrhea, nausea and vomiting  Genitourinary: Positive for penile pain  Negative for flank pain and hematuria  Musculoskeletal: Negative for back pain and myalgias  Skin: Positive for rash  Negative for wound  Neurological: Negative for dizziness, weakness and headaches  Psychiatric/Behavioral: Negative for confusion and decreased concentration  All other systems reviewed and are negative        Physical Exam  ED Triage Vitals [22 1852]   Temperature Pulse Respirations Blood Pressure SpO2   97 8 °F (36 6 °C) 86 18 (!) 155/106 96 %      Temp Source Heart Rate Source Patient Position - Orthostatic VS BP Location FiO2 (%)   Temporal Monitor Standing Right arm --      Pain Score       8             Orthostatic Vital Signs  Vitals:    08/21/22 2333 08/22/22 0240 08/22/22 0727 08/22/22 1519   BP:  140/83 138/83 137/82   Pulse: 75 80 75 69   Patient Position - Orthostatic VS:           Physical Exam  Vitals and nursing note reviewed  Constitutional:       General: He is not in acute distress  Appearance: Normal appearance  He is obese  He is not ill-appearing, toxic-appearing or diaphoretic  HENT:      Head: Normocephalic and atraumatic  Right Ear: External ear normal       Left Ear: External ear normal       Nose: Nose normal  No congestion or rhinorrhea  Eyes:      General: No scleral icterus  Right eye: No discharge  Left eye: No discharge  Conjunctiva/sclera: Conjunctivae normal    Cardiovascular:      Rate and Rhythm: Normal rate and regular rhythm  Pulses: Normal pulses  Heart sounds: Normal heart sounds  No murmur heard  No friction rub  No gallop  Pulmonary:      Effort: Pulmonary effort is normal  No respiratory distress  Breath sounds: Normal breath sounds  No stridor  No wheezing or rales  Abdominal:      General: Abdomen is flat  Bowel sounds are normal  There is distension  Palpations: Abdomen is soft  Tenderness: There is no abdominal tenderness  There is no right CVA tenderness, left CVA tenderness or guarding  Comments: There are several well-healed laparoscopic incision sites around the abdomen  There is no erythema, warmth, drainage  Genitourinary:     Comments: There is some redness surrounding the distal end of the meatus  There is a small amount of pus on the had but the patient is using to collect leakage  There is some sediment noted in the Ramachandran catheter  Musculoskeletal:      Right lower leg: No edema        Left lower leg: No edema    Skin:     General: Skin is warm and dry  Findings: Rash present  No bruising  Comments: There are several pinpoint sized, scabbed-over wounds on the superior back  Neurological:      Mental Status: He is alert  Psychiatric:         Mood and Affect: Mood normal          Behavior: Behavior normal          Thought Content:  Thought content normal          Judgment: Judgment normal          ED Medications  Medications   fluticasone (FLOVENT HFA) 110 MCG/ACT inhaler 2 puff (2 puffs Inhalation Refused 8/22/22 0817)   losartan (COZAAR) tablet 25 mg (25 mg Oral Given 8/22/22 0817)   montelukast (SINGULAIR) tablet 10 mg (10 mg Oral Given 8/21/22 2337)   oxybutynin (DITROPAN) tablet 5 mg (5 mg Oral Given 8/22/22 1950)   pravastatin (PRAVACHOL) tablet 40 mg (40 mg Oral Given 8/22/22 1643)   multi-electrolyte (PLASMALYTE-A/ISOLYTE-S PH 7 4) IV solution (125 mL/hr Intravenous New Bag 8/22/22 1635)   acetaminophen (TYLENOL) tablet 650 mg (650 mg Oral Given 8/22/22 1643)   docusate sodium (COLACE) capsule 100 mg (100 mg Oral Given 8/22/22 1523)   ondansetron (ZOFRAN) injection 4 mg (has no administration in time range)   aluminum-magnesium hydroxide-simethicone (MYLANTA) oral suspension 30 mL (30 mL Oral Given 8/22/22 1950)   enoxaparin (LOVENOX) subcutaneous injection 40 mg (40 mg Subcutaneous Given 8/22/22 0817)   oxyCODONE (ROXICODONE) IR tablet 2 5 mg (has no administration in time range)   oxyCODONE (ROXICODONE) IR tablet 5 mg (has no administration in time range)   HYDROmorphone HCl (DILAUDID) injection 0 2 mg (has no administration in time range)   ceftriaxone (ROCEPHIN) 1 g/50 mL in dextrose IVPB (1,000 mg Intravenous New Bag 8/22/22 0346)   acetaminophen (TYLENOL) tablet 650 mg (650 mg Oral Given 8/21/22 2107)   morphine injection 4 mg (4 mg Intravenous Given 8/21/22 2108)   iohexol (OMNIPAQUE) 350 MG/ML injection (MULTI-DOSE) 100 mL (70 mL Intravenous Given 8/21/22 2512)       Diagnostic Studies  Results Reviewed     Procedure Component Value Units Date/Time    Procalcitonin [[id number]]  (Normal) Collected: 08/22/22 0527    Lab Status: Final result Specimen: Blood from Arm, Right Updated: 08/22/22 0734     Procalcitonin 0 16 ng/ml     Comprehensive metabolic panel [[id number]]  (Abnormal) Collected: 08/22/22 0527    Lab Status: Final result Specimen: Blood from Arm, Right Updated: 08/22/22 5891     Sodium 137 mmol/L      Potassium 3 8 mmol/L      Chloride 107 mmol/L      CO2 23 mmol/L      ANION GAP 7 mmol/L      BUN 12 mg/dL      Creatinine 1 00 mg/dL      Glucose 106 mg/dL      Calcium 8 3 mg/dL      Corrected Calcium 9 3 mg/dL      AST 18 U/L      ALT 31 U/L      Alkaline Phosphatase 100 U/L      Total Protein 6 6 g/dL      Albumin 2 8 g/dL      Total Bilirubin 0 82 mg/dL      eGFR 79 ml/min/1 73sq m     Narrative:      [place] guidelines for Chronic Kidney Disease (CKD):     Stage 1 with normal or high GFR (GFR > 90 mL/min/1 73 square meters)    Stage 2 Mild CKD (GFR = 60-89 mL/min/1 73 square meters)    Stage 3A Moderate CKD (GFR = 45-59 mL/min/1 73 square meters)    Stage 3B Moderate CKD (GFR = 30-44 mL/min/1 73 square meters)    Stage 4 Severe CKD (GFR = 15-29 mL/min/1 73 square meters)    Stage 5 End Stage CKD (GFR <15 mL/min/1 73 square meters)  Note: GFR calculation is accurate only with a steady state creatinine    Magnesium [[id number]]  (Normal) Collected: 08/22/22 0527    Lab Status: Final result Specimen: Blood from Arm, Right Updated: 08/22/22 0702     Magnesium 2 4 mg/dL     Phosphorus [[id number]]  (Normal) Collected: 08/22/22 0527    Lab Status: Final result Specimen: Blood from Arm, Right Updated: 08/22/22 0702     Phosphorus 3 2 mg/dL     CBC and differential [[id number]]  (Abnormal) Collected: 08/22/22 0527    Lab Status: Final result Specimen: Blood from Arm, Right Updated: 08/22/22 0628     WBC 9 82 Thousand/uL      RBC 4 38 Million/uL Hemoglobin 13 0 g/dL      Hematocrit 38 8 %      MCV 89 fL      MCH 29 7 pg      MCHC 33 5 g/dL      RDW 13 1 %      MPV 9 9 fL      Platelets 932 Thousands/uL      nRBC 0 /100 WBCs      Neutrophils Relative 81 %      Immat GRANS % 0 %      Lymphocytes Relative 9 %      Monocytes Relative 9 %      Eosinophils Relative 1 %      Basophils Relative 0 %      Neutrophils Absolute 7 89 Thousands/µL      Immature Grans Absolute 0 04 Thousand/uL      Lymphocytes Absolute 0 92 Thousands/µL      Monocytes Absolute 0 86 Thousand/µL      Eosinophils Absolute 0 09 Thousand/µL      Basophils Absolute 0 02 Thousands/µL     Blood culture #1 [[id number]] Collected: 08/21/22 2101    Lab Status: Preliminary result Specimen: Blood from Hand, Left Updated: 08/22/22 0203     Blood Culture Received in Microbiology Lab  Culture in Progress  Blood culture #2 [[id number]] Collected: 08/21/22 2056    Lab Status: Preliminary result Specimen: Blood from Arm, Left Updated: 08/22/22 0203     Blood Culture Received in Microbiology Lab  Culture in Progress  Urine Microscopic [[id number]]  (Abnormal) Collected: 08/21/22 2114    Lab Status: Final result Specimen: Urine, Indwelling Ramachandran Catheter Updated: 08/21/22 2200     RBC, UA 2-4 /hpf      WBC, UA Innumerable /hpf      Epithelial Cells Occasional /hpf      Bacteria, UA Moderate /hpf      WBC Clumps Present    Urine culture [[id number]] Collected: 08/21/22 2114    Lab Status:  In process Specimen: Urine, Indwelling Ramachandran Catheter Updated: 08/21/22 2200    UA w Reflex to Microscopic w Reflex to Culture [[id number]]  (Abnormal) Collected: 08/21/22 2114    Lab Status: Final result Specimen: Urine, Indwelling Ramachandran Catheter Updated: 08/21/22 2145     Color, UA Light Yellow     Clarity, UA Turbid     Specific Gravity, UA 1 007     pH, UA 6 0     Leukocytes, UA Large     Nitrite, UA Positive     Protein, UA 30 (1+) mg/dl      Glucose, UA Negative mg/dl      Ketones, UA Negative mg/dl      Urobilinogen, UA <2 0 mg/dl      Bilirubin, UA Negative     Occult Blood, UA Moderate    Lactic acid, plasma [[id number]]  (Normal) Collected: 08/21/22 2056    Lab Status: Final result Specimen: Blood from Arm, Left Updated: 08/21/22 2129     LACTIC ACID 0 9 mmol/L     Narrative:      Result may be elevated if tourniquet was used during collection      Comprehensive metabolic panel [[id number]]  (Abnormal) Collected: 08/21/22 2056    Lab Status: Final result Specimen: Blood from Arm, Left Updated: 08/21/22 2128     Sodium 135 mmol/L      Potassium 3 9 mmol/L      Chloride 106 mmol/L      CO2 25 mmol/L      ANION GAP 4 mmol/L      BUN 15 mg/dL      Creatinine 1 09 mg/dL      Glucose 105 mg/dL      Calcium 8 8 mg/dL      Corrected Calcium 9 4 mg/dL      AST 23 U/L      ALT 35 U/L      Alkaline Phosphatase 103 U/L      Total Protein 7 0 g/dL      Albumin 3 3 g/dL      Total Bilirubin 0 77 mg/dL      eGFR 71 ml/min/1 73sq m     Narrative:      [place] guidelines for Chronic Kidney Disease (CKD):     Stage 1 with normal or high GFR (GFR > 90 mL/min/1 73 square meters)    Stage 2 Mild CKD (GFR = 60-89 mL/min/1 73 square meters)    Stage 3A Moderate CKD (GFR = 45-59 mL/min/1 73 square meters)    Stage 3B Moderate CKD (GFR = 30-44 mL/min/1 73 square meters)    Stage 4 Severe CKD (GFR = 15-29 mL/min/1 73 square meters)    Stage 5 End Stage CKD (GFR <15 mL/min/1 73 square meters)  Note: GFR calculation is accurate only with a steady state creatinine    CBC and differential [[id number]]  (Abnormal) Collected: 08/21/22 2056    Lab Status: Final result Specimen: Blood from Arm, Left Updated: 08/21/22 2114     WBC 9 36 Thousand/uL      RBC 4 63 Million/uL      Hemoglobin 13 4 g/dL      Hematocrit 40 6 %      MCV 88 fL      MCH 28 9 pg      MCHC 33 0 g/dL      RDW 12 9 %      MPV 10 1 fL      Platelets 557 Thousands/uL      nRBC 0 /100 WBCs      Neutrophils Relative 80 %      Immat GRANS % 0 %      Lymphocytes Relative 10 %      Monocytes Relative 8 %      Eosinophils Relative 2 %      Basophils Relative 0 %      Neutrophils Absolute 7 44 Thousands/µL      Immature Grans Absolute 0 03 Thousand/uL      Lymphocytes Absolute 0 96 Thousands/µL      Monocytes Absolute 0 77 Thousand/µL      Eosinophils Absolute 0 14 Thousand/µL      Basophils Absolute 0 02 Thousands/µL                  CT abdomen pelvis with contrast   Final Result by Marcia Bentley MD (08/22 3689)      1  Fluid collection with gas along the right obturator internus muscle, possibly postoperative as no enhancing wall is visualized  Early abscess cannot be entirely excluded  2   Fullness in the bilateral collecting systems and ureters with periureteral stranding and ureteral enhancement suspicious for ascending urinary tract infection in the setting of cystitis  3   Mild hepatic steatosis  Findings are consistent with the preliminary report from [facility] which was provided shortly after completion of the exam          Workstation performed: [id number]               Procedures  Procedures      ED Course  ED Course as of 08/22/22 2016   Sun Aug 21, 2022   2007 Discussed case with on call urologist, Dr Luis Armando Cifuentes  Agrees with plan, will see patient in morning   2008 Patient's temperature now 100 3                                       MDM  Number of Diagnoses or Management Options  Diagnosis management comments: Patient is a 50-year-old male with history of prostate cancer status post radical prostatectomy on August 11th presents with fevers, chills, loss of appetite, and pus draining from his Ramachandran  Concerning for potential post op infection, UTI vs prostatitis  Unlikely to be due to PE, PNA, atelectasis, or drug reaction  Patient is currently medically stable but does have a temperature of 100 3° F   Will get CBC, CMP, UA, lactate, blood cultures, CT abdomen pelvis with contrast   Will consult Urology on-call for further management workup  Patient will likely be admitted  Labs largely unremarkable, lactate 0 9, urine concerning for urinary tract infection  Blood cultures pending  Per chart review patient was admitted and started on IV Rocephin         Amount and/or Complexity of Data Reviewed  Clinical lab tests: ordered and reviewed  Tests in the radiology section of CPT®: ordered and reviewed  Review and summarize past medical records: yes  Discuss the patient with other providers: yes    Patient Progress  Patient progress: stable      Disposition  Final diagnoses:   H/O radical prostatectomy   Fever in adult     Time reflects when diagnosis was documented in both MDM as applicable and the Disposition within this note     Time User Action Codes Description Comment    8/21/2022  8:38 PM Segetis Add [Z90 79] H/O radical prostatectomy     8/21/2022  8:38 PM Leslie Smith 429 [R50 9] Fever in adult       ED Disposition     None      Follow-up Information    None         Current Discharge Medication List      CONTINUE these medications which have NOT CHANGED    Details   acetaminophen (TYLENOL) 325 mg tablet Take 2 tablets (650 mg total) by mouth every 6 (six) hours as needed for mild pain  Qty: 30 tablet, Refills: 0    Associated Diagnoses: Prostate cancer ([facility] [address] )      aspirin (ECOTRIN LOW STRENGTH) 81 mg EC tablet Take 81 mg by mouth daily      docusate sodium (COLACE) 100 mg capsule Take 1 capsule (100 mg total) by mouth 2 (two) times a day  Qty: 30 capsule, Refills: 0    Associated Diagnoses: Prostate cancer ([facility] [address] )      irbesartan (AVAPRO) 75 mg tablet Take 1 tablet (75 mg total) by mouth daily  Qty: 90 tablet, Refills: 3    Associated Diagnoses: Hypertension, unspecified type      oxybutynin (DITROPAN) 5 mg tablet Take 1 tablet (5 mg total) by mouth every 8 (eight) hours as needed (bladder spasm/penile pain)  Qty: 30 tablet, Refills: 0    Associated Diagnoses: Prostate cancer ([facility] [address] )      rosuvastatin (CRESTOR) 5 mg tablet Take 1 tablet (5 mg total) by mouth every other day  Qty: 90 tablet, Refills: 3    Associated Diagnoses: Hyperlipidemia, unspecified hyperlipidemia type      cefuroxime (CEFTIN) 500 mg tablet Take one tablet with breakfast and dinner starting the day prior to catheter removal, for three days  Qty: 6 tablet, Refills: 0    Associated Diagnoses: Prostate cancer ([address] )      fluticasone (Flovent HFA) 110 MCG/ACT inhaler Inhale 2 puffs 2 (two) times a day Rinse mouth after use  Qty: 12 g, Refills: 0    Associated Diagnoses: Mild intermittent reactive airway disease with acute exacerbation      ketorolac (TORADOL) 10 mg tablet Take 1 tablet (10 mg total) by mouth every 6 (six) hours as needed for moderate pain  Qty: 20 tablet, Refills: 0    Associated Diagnoses: Prostate cancer (HCC)      montelukast (SINGULAIR) 10 mg tablet Take 1 tablet (10 mg total) by mouth daily at bedtime  Qty: 30 tablet, Refills: 5    Associated Diagnoses: Mild intermittent reactive airway disease with acute exacerbation           No discharge procedures on file  PDMP Review     None           ED Provider  Attending physically available and evaluated Yvette Murray  CLAUDETTE managed the patient along with the ED Attending      Electronically Signed by         Tessa Craft MD  08/22/22 2017

## 2022-08-21 NOTE — TELEPHONE ENCOUNTER
[address]ard[address] 8 temp   ----- Message from Sarbjit Swift sent at 2022  5:03 PM EDT -----  " My  has a temperature of 100 8 , he had surgery on   "

## 2022-08-22 ENCOUNTER — TELEPHONE (OUTPATIENT)
Dept: OTHER | Facility: OTHER | Age: 62
End: 2022-08-22

## 2022-08-22 DIAGNOSIS — R39.9 UTI SYMPTOMS: Primary | ICD-10-CM

## 2022-08-22 PROBLEM — K76.0 HEPATIC STEATOSIS: Status: ACTIVE | Noted: 2022-08-22

## 2022-08-22 LAB
ALBUMIN SERPL BCP-MCNC: 2.8 G/DL (ref 3.5–5)
ALP SERPL-CCNC: 100 U/L (ref 46–116)
ALT SERPL W P-5'-P-CCNC: 31 U/L (ref 12–78)
ANION GAP SERPL CALCULATED.3IONS-SCNC: 7 MMOL/L (ref 4–13)
AST SERPL W P-5'-P-CCNC: 18 U/L (ref 5–45)
BASOPHILS # BLD AUTO: 0.02 THOUSANDS/ΜL (ref 0–0.1)
BASOPHILS NFR BLD AUTO: 0 % (ref 0–1)
BILIRUB SERPL-MCNC: 0.82 MG/DL (ref 0.2–1)
BUN SERPL-MCNC: 12 MG/DL (ref 5–25)
CALCIUM ALBUM COR SERPL-MCNC: 9.3 MG/DL (ref 8.3–10.1)
CALCIUM SERPL-MCNC: 8.3 MG/DL (ref 8.3–10.1)
CHLORIDE SERPL-SCNC: 107 MMOL/L (ref 96–108)
CO2 SERPL-SCNC: 23 MMOL/L (ref 21–32)
CREAT SERPL-MCNC: 1 MG/DL (ref 0.6–1.3)
EOSINOPHIL # BLD AUTO: 0.09 THOUSAND/ΜL (ref 0–0.61)
EOSINOPHIL NFR BLD AUTO: 1 % (ref 0–6)
ERYTHROCYTE [DISTWIDTH] IN BLOOD BY AUTOMATED COUNT: 13.1 % (ref 11.6–15.1)
GFR SERPL CREATININE-BSD FRML MDRD: 79 ML/MIN/1.73SQ M
GLUCOSE SERPL-MCNC: 106 MG/DL (ref 65–140)
HCT VFR BLD AUTO: 38.8 % (ref 36.5–49.3)
HGB BLD-MCNC: 13 G/DL (ref 12–17)
IMM GRANULOCYTES # BLD AUTO: 0.04 THOUSAND/UL (ref 0–0.2)
IMM GRANULOCYTES NFR BLD AUTO: 0 % (ref 0–2)
LYMPHOCYTES # BLD AUTO: 0.92 THOUSANDS/ΜL (ref 0.6–4.47)
LYMPHOCYTES NFR BLD AUTO: 9 % (ref 14–44)
MAGNESIUM SERPL-MCNC: 2.4 MG/DL (ref 1.6–2.6)
MCH RBC QN AUTO: 29.7 PG (ref 26.8–34.3)
MCHC RBC AUTO-ENTMCNC: 33.5 G/DL (ref 31.4–37.4)
MCV RBC AUTO: 89 FL (ref 82–98)
MONOCYTES # BLD AUTO: 0.86 THOUSAND/ΜL (ref 0.17–1.22)
MONOCYTES NFR BLD AUTO: 9 % (ref 4–12)
NEUTROPHILS # BLD AUTO: 7.89 THOUSANDS/ΜL (ref 1.85–7.62)
NEUTS SEG NFR BLD AUTO: 81 % (ref 43–75)
NRBC BLD AUTO-RTO: 0 /100 WBCS
PHOSPHATE SERPL-MCNC: 3.2 MG/DL (ref 2.3–4.1)
PLATELET # BLD AUTO: 224 THOUSANDS/UL (ref 149–390)
PMV BLD AUTO: 9.9 FL (ref 8.9–12.7)
POTASSIUM SERPL-SCNC: 3.8 MMOL/L (ref 3.5–5.3)
PROCALCITONIN SERPL-MCNC: 0.16 NG/ML
PROT SERPL-MCNC: 6.6 G/DL (ref 6.4–8.4)
RBC # BLD AUTO: 4.38 MILLION/UL (ref 3.88–5.62)
SODIUM SERPL-SCNC: 137 MMOL/L (ref 135–147)
WBC # BLD AUTO: 9.82 THOUSAND/UL (ref 4.31–10.16)

## 2022-08-22 PROCEDURE — 84100 ASSAY OF PHOSPHORUS: CPT | Performed by: INTERNAL MEDICINE

## 2022-08-22 PROCEDURE — 83735 ASSAY OF MAGNESIUM: CPT | Performed by: INTERNAL MEDICINE

## 2022-08-22 PROCEDURE — 85025 COMPLETE CBC W/AUTO DIFF WBC: CPT | Performed by: INTERNAL MEDICINE

## 2022-08-22 PROCEDURE — NC001 PR NO CHARGE: Performed by: UROLOGY

## 2022-08-22 PROCEDURE — 84145 PROCALCITONIN (PCT): CPT | Performed by: INTERNAL MEDICINE

## 2022-08-22 PROCEDURE — 99448 NTRPROF PH1/NTRNET/EHR 21-30: CPT | Performed by: PHYSICIAN ASSISTANT

## 2022-08-22 PROCEDURE — 99233 SBSQ HOSP IP/OBS HIGH 50: CPT | Performed by: STUDENT IN AN ORGANIZED HEALTH CARE EDUCATION/TRAINING PROGRAM

## 2022-08-22 PROCEDURE — 80053 COMPREHEN METABOLIC PANEL: CPT | Performed by: INTERNAL MEDICINE

## 2022-08-22 RX ADMIN — ENOXAPARIN SODIUM 40 MG: 40 INJECTION SUBCUTANEOUS at 08:17

## 2022-08-22 RX ADMIN — ACETAMINOPHEN 650 MG: 325 TABLET ORAL at 03:46

## 2022-08-22 RX ADMIN — SODIUM CHLORIDE, SODIUM GLUCONATE, SODIUM ACETATE, POTASSIUM CHLORIDE, MAGNESIUM CHLORIDE, SODIUM PHOSPHATE, DIBASIC, AND POTASSIUM PHOSPHATE 125 ML/HR: .53; .5; .37; .037; .03; .012; .00082 INJECTION, SOLUTION INTRAVENOUS at 03:26

## 2022-08-22 RX ADMIN — SODIUM CHLORIDE, SODIUM GLUCONATE, SODIUM ACETATE, POTASSIUM CHLORIDE, MAGNESIUM CHLORIDE, SODIUM PHOSPHATE, DIBASIC, AND POTASSIUM PHOSPHATE 125 ML/HR: .53; .5; .37; .037; .03; .012; .00082 INJECTION, SOLUTION INTRAVENOUS at 16:35

## 2022-08-22 RX ADMIN — CEFTRIAXONE 1000 MG: 10 INJECTION, POWDER, FOR SOLUTION INTRAVENOUS at 03:46

## 2022-08-22 RX ADMIN — DOCUSATE SODIUM 100 MG: 100 CAPSULE, LIQUID FILLED ORAL at 15:23

## 2022-08-22 RX ADMIN — SODIUM CHLORIDE, SODIUM GLUCONATE, SODIUM ACETATE, POTASSIUM CHLORIDE, MAGNESIUM CHLORIDE, SODIUM PHOSPHATE, DIBASIC, AND POTASSIUM PHOSPHATE 125 ML/HR: .53; .5; .37; .037; .03; .012; .00082 INJECTION, SOLUTION INTRAVENOUS at 08:17

## 2022-08-22 RX ADMIN — OXYBUTYNIN CHLORIDE 5 MG: 5 TABLET ORAL at 11:24

## 2022-08-22 RX ADMIN — ALUMINA, MAGNESIA, AND SIMETHICONE ORAL SUSPENSION REGULAR STRENGTH 30 ML: 1200; 1200; 120 SUSPENSION ORAL at 19:50

## 2022-08-22 RX ADMIN — LOSARTAN POTASSIUM 25 MG: 25 TABLET, FILM COATED ORAL at 08:17

## 2022-08-22 RX ADMIN — ALUMINA, MAGNESIA, AND SIMETHICONE ORAL SUSPENSION REGULAR STRENGTH 30 ML: 1200; 1200; 120 SUSPENSION ORAL at 11:28

## 2022-08-22 RX ADMIN — CEFTRIAXONE 1000 MG: 10 INJECTION, POWDER, FOR SOLUTION INTRAVENOUS at 21:37

## 2022-08-22 RX ADMIN — PRAVASTATIN SODIUM 40 MG: 40 TABLET ORAL at 16:43

## 2022-08-22 RX ADMIN — OXYBUTYNIN CHLORIDE 5 MG: 5 TABLET ORAL at 19:50

## 2022-08-22 RX ADMIN — ACETAMINOPHEN 650 MG: 325 TABLET ORAL at 16:43

## 2022-08-22 NOTE — QUICK NOTE
The patient CT scan came back showing the following findings:  (Vrad)  · 6 9 x 1 9 centimetre gas/fluid collection on right pelvic sidewall likely postoperative seroma but super infection or abscess cannot be entirely excluded  · Trace free fluid along left pelvic sidewall secondary to recent surgery  · Acute cystitis by imaging criteria  · Very mild degree of bilateral symmetrical hydroureteronephrosis without obstructive utero lithiasis overall concerning for ascending urinary tract infection  · Small exophytic hyperdense cyst versus solid lesion of the right kidney  The patient is currently on Rocephin  Urology consult already placed  Patient currently NPO

## 2022-08-22 NOTE — TELEMEDICINE
e-Consult (IPC)  - Interventional Radiology  Yanira Clark 60 y o  male MRN: [id number]  Unit/Bed#: [facility] 824-01 Encounter: [id number]          Interventional Radiology has been consulted to evaluate Yanira Clark    We were consulted by urology concerning this patient with post operative fluid collection  IP Consult to IR  Consult performed by: Carlos Leonard PA-C  Consult ordered by: ANDRES Pereyra        08/22/22    Assessment/Recommendation:     60year old male with pmh of robotic prostatectomy 8/11/22, presenting with abdominal pain, fever  CT abdomen pelvis with right obturator internus muscle fluid collection 1 9 x 6 9cm    - unfortunately, collection in deep position  Would be at higher risk for damaging of surrounding structures with drain placement  - would recommend conservative management at this time  If patient does not improve, would recommend follow up CT in a few days to assess if collection easier to access  - please reconsult IR as needed     21-30 minutes, >50% of the total time devoted to medical consultative verbal/EMR discussion between providers  Written report will be generated in the EMR  Thank you for allowing Interventional Radiology to participate in the care of Yanira Clark  Please don't hesitate to call or TigerText us with any questions       Carlos Leonard PA-C

## 2022-08-22 NOTE — CONSULTS
Consult - Urology   Yanira Clark 1960, 60 y o  male MRN: [id number]    Unit/Bed#: [facility] 824-01 Encounter: [id number]      Assessment and plan   Postoperative fever and pelvic fluid collection- differential diagnosis includes not limited to hematoma, seroma, lymphocele, urinoma  Measures 6x1cm sliver on the right side obturator/deep pelvis  Not amenable to percutaneous drainage based on position closely adjacent to vessels and deep tissues today after review of images by IR team   No surgical intervention needed for bladder wall thickening or hydroureteronephrosis in postop period expected  Will continue IV antibiotics (ceftriaxone 1g q24h), and repeat CT scan or even CT cystogram in the next 24-48 hours  Given the surgical history urinoma vs hematoma is suspected  Consider exchanging his urethral Ramachandran catheter in the operating room with a modified catheter if cystogram confirms communication to bladder  Mr Marina Wright is postop day 8 robot assisted laparoscopic prostatectomy for prostate cancer  He did not have any pelvic lymph node dissection though he did require bladder neck reconstruction  His drain was on the llq  At the time there was some initial concern for anastomotic leak, though CLARE fluid output ceased as did his pericatheter leakage had improved prior to discharge  He was planned to have confirmatory cystogram this week  Spoke with surgeon on Thursday  He was feeling great walking around the neighborhood and eating well until Friday after a ride in the car his leakage and pain returned  Returns now with fevers rigors overnight and a right pelvic sidewall fluid collection on CT in the emergency room  Evaluation by intervention radiology team this morning, challenging location of the fluid collection, not acutely amenable to percutaneous drainage  Hgb 13 0  WBC 9  Creatinine 1 0    Will discuss with MD, expect additions to attestation above        Subjective:  Two days of fevers malaise anorexia at home rigors overnight on some pelvic and perineal pressure  Ramachandran catheter has been draining clear yellow urine  Burning and discharge at meatus  Fever broke overnight he feels much better today  Only has pain when sitting, no pain lying down at current time  He lifts his right buttock when sitting because that's where the pain hits him in the perineum  For the last week he felt he was doing well felt great up until Friday started not feeling well as above  Review of Systems   Constitutional: Positive for appetite change, chills, fatigue and fever  Negative for activity change and unexpected weight change  HENT: Negative  Respiratory: Negative  Negative for shortness of breath  Cardiovascular: Negative  Negative for chest pain  Gastrointestinal: Negative for abdominal distention, abdominal pain, constipation, diarrhea, nausea and vomiting  Endocrine: Negative  Genitourinary: Positive for penile discharge and penile pain  Negative for decreased urine volume, dysuria, flank pain, frequency, hematuria, penile swelling, scrotal swelling, testicular pain and urgency  Musculoskeletal: Negative for back pain and gait problem  Skin: Negative  Allergic/Immunologic: Negative  Neurological: Negative  Hematological: Negative for adenopathy  Does not bruise/bleed easily  Objective:  Vitals: Blood pressure 138/83, pulse 75, temperature 98 5 °F (36 9 °C), resp  rate 17, height 5' 9" (1 753 m), weight 106 kg (233 lb 9 6 oz), SpO2 93 %  ,Body mass index is 34 5 kg/m²  Physical Exam  Vitals and nursing note reviewed  Constitutional:       Appearance: Normal appearance  He is well-developed  He is not ill-appearing  HENT:      Head: Normocephalic and atraumatic  Cardiovascular:      Rate and Rhythm: Normal rate and regular rhythm  Heart sounds: Normal heart sounds  No murmur heard    Pulmonary:      Effort: Pulmonary effort is normal       Breath sounds: Normal breath sounds  Abdominal:      General: Bowel sounds are normal  There is no distension  Palpations: Abdomen is soft  Tenderness: There is no abdominal tenderness  There is no right CVA tenderness, left CVA tenderness, guarding or rebound  Comments: Well healed abdominal laparoscopy incisions   Genitourinary:     Comments: Circumcised penis, normal phallus, orthotopic patent meatus  Yellow fibrinous exudate at meatus  Testes smooth descended bilaterally into the scrotum nontender with no palpable mass  No crepitus or point tenderness in pelvis scrotum or perineum  Musculoskeletal:         General: Normal range of motion  Right lower leg: No edema  Left lower leg: No edema  Skin:     General: Skin is warm and dry  Capillary Refill: Capillary refill takes less than 2 seconds  Coloration: Skin is not pale  Neurological:      Mental Status: He is alert and oriented to person, place, and time  Imaging:    CT abdomen pelvis with contrast [[id number]] Collected: 08/22/22 5836   Order Status: Completed Updated: 08/22/22 0829   Narrative:     CT ABDOMEN AND PELVIS WITH IV CONTRAST     INDICATION:   post op prostatectomy   Fevers, chills, pelvic pain     COMPARISON:  None  TECHNIQUE:  CT examination of the abdomen and pelvis was performed  Axial, sagittal, and coronal 2D reformatted images were created from the source data and submitted for interpretation  Radiation dose length product (DLP) for this visit:  907 mGy-cm    This examination, like all CT scans performed in the [facility], was performed utilizing techniques to minimize radiation dose exposure, including the use of iterative   reconstruction and automated exposure control  IV Contrast:  70 mL of iohexol (OMNIPAQUE)   Enteric Contrast:  Enteric contrast was not administered       FINDINGS:     ABDOMEN     LOWER CHEST:  No clinically significant abnormality identified in the visualized lower chest      LIVER/BILIARY TREE:  Liver is diffusely decreased in density consistent with fatty change   No CT evidence of suspicious hepatic mass   Normal hepatic contours   No biliary dilatation  GALLBLADDER:  No calcified gallstones  No pericholecystic inflammatory change  SPLEEN:  Unremarkable  PANCREAS:  Unremarkable  ADRENAL GLANDS:  Unremarkable  KIDNEYS/URETERS: Lalla Tammy is mild fullness in the bilateral collecting systems and ureters to the level of the collapsed bladder with mild periureteral stranding and enhancement   Exophytic right renal cyst as seen on prior ultrasound  STOMACH AND BOWEL:  Unremarkable  APPENDIX:  No findings to suggest appendicitis  ABDOMINOPELVIC CAVITY: There is a fluid collection with gas along the obturator internus muscle on the right measuring 1 9 x 6 9 cm   There is no enhancing wall   No ascites   No pneumoperitoneum   No lymphadenopathy  VESSELS:  Unremarkable for patient's age  PELVIS     REPRODUCTIVE ORGANS:  Status post prostatectomy  URINARY BLADDER:  The bladder is collapsed with a Ramachandran catheter   There is circumferential wall thickening and adjacent stranding  ABDOMINAL WALL/INGUINAL REGIONS: Lalla Tammy are bilateral fat-containing inguinal hernias  OSSEOUS STRUCTURES:  No acute fracture or destructive osseous lesion  Impression:       1   Fluid collection with gas along the right obturator internus muscle, possibly postoperative as no enhancing wall is visualized   Early abscess cannot be entirely excluded  2   Fullness in the bilateral collecting systems and ureters with periureteral stranding and ureteral enhancement suspicious for ascending urinary tract infection in the setting of cystitis  3   Mild hepatic steatosis  Imaging reviewed - both report and images personally reviewed       Labs:  Recent Labs     08/21/22 2056 08/22/22  0527   WBC 9 36 9 82     Recent Labs     08/21/22 2056 08/22/22  0527   HGB 13 4 13 0       Recent Labs     08/21/22 2056 08/22/22  0527   CREATININE 1 09 1 00       Microbiology:  ucx pending    History:  Social History     Socioeconomic History    Marital status: /Civil Union     Spouse name: None    Number of children: None    Years of education: None    Highest education level: None   Occupational History    None   Tobacco Use    Smoking status: Never Smoker    Smokeless tobacco: Never Used   Vaping Use    Vaping Use: Never used   Substance and Sexual Activity    Alcohol use: Yes     Alcohol/week: 2 0 standard drinks     Types: 2 Standard drinks or equivalent per week     Comment: 1 x month    Drug use: Not Currently     Types: Marijuana     Comment: College    Sexual activity: Yes     Partners: Female     Birth control/protection: Male Sterilization   Other Topics Concern    None   Social History Narrative    None     Social Determinants of Health     Financial Resource Strain: Not on file   Food Insecurity: No Food Insecurity    Worried About Running Out of Food in the Last Year: Never true    Paula of Food in the Last Year: Never true   Transportation Needs: No Transportation Needs    Lack of Transportation (Medical): No    Lack of Transportation (Non-Medical): No   Physical Activity: Not on file   Stress: Not on file   Social Connections: Not on file   Intimate Partner Violence: Not on file   Housing Stability: Low Risk     Unable to Pay for Housing in the Last Year: No    Number of Places Lived in the Last Year: 1    Unstable Housing in the Last Year: No     Financial Resource Strain: Not on file   Food Insecurity: No Food Insecurity    Worried About [address] in the Last Year: Never true    [address] in the Last Year: Never true   Transportation Needs: No Transportation Needs    Lack of Transportation (Medical): No    Lack of Transportation (Non-Medical):  No   Physical Activity: Not on file   Stress: Not on file   Social Connections: Not on file   Intimate Partner Violence: Not on file   Housing Stability: [address] Unable to Pay for Housing in the Last Year: No    Number of Places Lived in the Last Year: 1    Unstable Housing in the Last Year: No      Diagnosis Date    Anxiety     Cancer ([facility] [address] ) 6/15/22    Prostatic    GERD (gastroesophageal reflux disease)     HL (hearing loss)     B/L    Hyperlipidemia     Hypertension     Obesity      Past Surgical History:   Procedure Laterality Date    COLONOSCOPY      LA BIOPSY OF PROSTATE,NEEDLE,TRANSPERINEAL N/A 2022    Procedure: TRANSPERINEAL MRI FUSION BIOPSY PROSTATE;  Surgeon: Lorenza Patel MD;  [facility];  Service: Urology    LA LAP,PROSTATECTOMY,RADICAL,W/NERVE SPARE,INCL ROBOTIC N/A 2022    Procedure: ROBOTIC PROSTATECTOMY RADICAL , BLADDER NECK RECONSTRUCTION;  Surgeon: Marco Reyes MD;  [facility];  Service: Urology    VARICOSE VEIN SURGERY      VASECTOMY      WISDOM TOOTH EXTRACTION       Family History   Problem Relation Age of Onset    Sickle cell trait Mother     Hypertension Mother     Stroke Mother             Hypertension Father     Heart disease Father     Cardiomyopathy Brother     Sickle cell trait Maternal Grandmother     Sickle cell trait Maternal Grandfather     Hypertension Paternal Grandfather        iesha [place], [place]  Date: 2022 Time: 9:34 AM

## 2022-08-22 NOTE — RESTORATIVE TECHNICIAN NOTE
Restorative Technician Note      Patient Name: Yris Watson     Restorative Tech Visit Date: 8/22/2022  Note Type: Mobility  Patient Position Upon Consult: Supine  Activity Performed: Ambulated  Assistive Device: Standard walker  Patient Position at End of Consult: Standing; All needs within reach;  Other (comment) (Continued ambulating with wife; pt has no other questions or concerns at this time)    Rachid Lopez  DPT, Restorative Technician

## 2022-08-22 NOTE — ASSESSMENT & PLAN NOTE
· Incidental finding on CT abdomen and pelvis done 8/21  · Not clinically relevant to presentation  · Patient showed have follow-up with PCP in consideration of referral to GI outpatient if needed

## 2022-08-22 NOTE — H&P
[facility]  H&P- Rio  1960, 60 y o  male MRN: [id number]  Unit/Bed#: [facility] 824-01 Encounter: [id number]  Primary Care Provider: Corine Chen DO   Date and time admitted to hospital: 2022  7:22 PM    * Fever in adult  Assessment & Plan  Blood cultures sent x2  Lactic acid  Urine culture  Procalcitonin  Patient admitted in lieu of fever and to observe temperatures and cultures  Urology is consulted  After blood work and blood cultures sent; urine culture, will administer Rocephin  Update:  Lactic acid 0 9  Vital signs have been stable  BUN creatinine 15 and 1 09; will continue fluids and antibiotic Rocephin  H/O radical prostatectomy  Assessment & Plan  Urology consult  Prostate cancer [facility])  Assessment & Plan  Status post prostatectomy; awaiting further urology opinion  Gastroesophageal reflux disease without esophagitis  Assessment & Plan  Maalox as needed  Hyperlipemia  Assessment & Plan  On Crestor  HTN (hypertension)  Assessment & Plan  Continue Avapro 75 mg daily  VTE Prophylaxis: Enoxaparin (Lovenox)  / sequential compression device   Code Status: Level 1 - Full Code as discussed with patient  POLST: There is no POLST form on file for this patient (pre-hospital)    Anticipated Length of Stay:  Patient will be admitted on an Inpatient basis with an anticipated length of stay of  greater than 2 midnights  Justification for Hospital Stay: Please see detailed plans noted above  Status post prostatectomy with development of fever and abdominal discomfort more at the underside of the testicles  Cloudy urine  Urinary tract infection being entertained  For urology consult  Chief Complaint:     Fever with underside of the testicle being painful  Post prostatectomy for prostate cancer    History of Present Illness:  Rio  is a 60 y o  male who has past medical history significant for Gastroesophageal reflux disease, hypertension, hyperlipidemia and vitamin-D deficiency and prostate cancer undergoing radical prostatectomy on the 11th of August   The patient was doing quite well after the procedure and was eating and with return of usual functions  Approximately Friday,  the patient went for a long car trip and since then has been complaining of lower abdominal discomfort  The abdominal discomfort is more so located just under side of the testicle  Likewise, noted that the urine draining in the Uro bag has been cloudy  Ever since noon time prior to being seen in the emergency room, the patient has been complaining of fever and chills with temperature as high as 101 8° F  Patient therefore called the urology office and was advised to come to the emergency room for further evaluation  No cough or cold  No chest pain  Patient denies having any cardiac conditions except for blood pressure problem as noted  Currently, patient is resting on bed  He denies having any symptoms as of this moment except for the discomfort as noted above  Review of Systems:    Constitutional:  Fever and chills as noted above      Eyes:  Denies change in visual acuity   HENT:  Denies nasal congestion or sore throat   Respiratory:  Denies cough or shortness of breath   Cardiovascular:  Denies chest pain or edema   GI:  Denies abdominal pain, except for posterior testicular pain nausea, vomiting, bloody stools or diarrhea   :  Denies dysuria   Musculoskeletal:  Denies back pain or joint pain   Integument:  Denies rash   Neurologic:  Denies headache, focal weakness or sensory changes   Endocrine:  Denies polyuria or polydipsia   Lymphatic:  Denies swollen glands   Psychiatric:  Denies depression or anxiety     Past Medical and Surgical History:   Past Medical History:   Diagnosis Date    Anxiety     [facility]    Prostatic    GERD (gastroesophageal reflux disease) 2001    HL (hearing loss)     B/L    Hyperlipidemia     Hypertension     Obesity      Past Surgical History:   Procedure Laterality Date    COLONOSCOPY      CT BIOPSY OF PROSTATE,NEEDLE,TRANSPERINEAL N/A 06/08/2022    Procedure: TRANSPERINEAL MRI FUSION BIOPSY PROSTATE;  Surgeon: Conner Cheney MD;  [facility];  Service: Urology    CT LAP,PROSTATECTOMY,RADICAL,W/NERVE [address] ROBOTIC N/A 8/11/2022    Procedure: ROBOTIC PROSTATECTOMY RADICAL , BLADDER NECK RECONSTRUCTION;  Surgeon: Igor Montes De Oca MD;  [facility];  Service: Urology    VARICOSE VEIN SURGERY      VASECTOMY      WISDOM TOOTH EXTRACTION         Meds/Allergies:  Medications Prior to Admission   Medication    acetaminophen (TYLENOL) 325 mg tablet    aspirin (ECOTRIN LOW STRENGTH) 81 mg EC tablet    [START ON 8/28/2022] cefuroxime (CEFTIN) 500 mg tablet    docusate sodium (COLACE) 100 mg capsule    fluticasone (Flovent HFA) 110 MCG/ACT inhaler    irbesartan (AVAPRO) 75 mg tablet    ketorolac (TORADOL) 10 mg tablet    montelukast (SINGULAIR) 10 mg tablet    oxybutynin (DITROPAN) 5 mg tablet    rosuvastatin (CRESTOR) 5 mg tablet       Allergies: No Known Allergies  History:  Marital Status: /Civil Union   Occupation:  [employer]  Patient Pre-hospital Living Situation:  Lives at home with wife  Wife is retired    Patient Pre-hospital Level of Mobility:  Ambulatory  Patient Pre-hospital Diet Restrictions:  Regular  Substance Use History:   Social History     Substance and Sexual Activity   Alcohol Use Yes    Alcohol/week: 2 0 standard drinks    Types: 2 Standard drinks or equivalent per week    Comment: 1 x month     Social History     Tobacco Use   Smoking Status Never Smoker   Smokeless Tobacco Never Used     Social History     Substance and Sexual Activity   Drug Use Not Currently    Types: Marijuana    Comment: College       Family History:  Family History   Problem Relation Age of Onset    Sickle cell trait Mother     Hypertension Mother     Stroke Mother             Hypertension Father     Heart disease Father     Cardiomyopathy Brother     Sickle cell trait Maternal Grandmother     Sickle cell trait Maternal Grandfather     Hypertension Paternal Grandfather        Physical Exam:     Vitals:   Blood Pressure: (!) 155/106 (22)  Pulse: 86 (22)  Temperature: 100 3 °F (37 9 °C) (22)  Temp Source: Oral (22)  Respirations: 18 (22)  SpO2: 96 % (22)    Constitutional:  Well developed, well nourished, no acute distress, non-toxic appearance   Eyes:  PERRL, conjunctiva normal   HENT:  Atraumatic, external ears normal, nose normal, oropharynx moist, no pharyngeal exudates  Neck- normal range of motion, no tenderness, supple   Respiratory:  No respiratory distress, normal breath sounds, no rales, no wheezing   Cardiovascular:  Normal rate, normal rhythm, no murmurs, no gallops, no rubs   GI:  Soft, nondistended, normal bowel sounds, nontender, no organomegaly, no mass, no rebound, no guarding   :  No costovertebral angle tenderness   Musculoskeletal:  No edema, no tenderness, no deformities  Back- no tenderness  Integument:  Well hydrated, no rash   Lymphatic:  No lymphadenopathy noted   Neurologic:  Alert &awake, communicative, CN 2-12 normal, normal motor function, normal sensory function, no focal deficits noted   Psychiatric:  Speech and behavior appropriate       Lab Results: I have personally reviewed pertinent reports        Results from last 7 days   Lab Units 22   WBC Thousand/uL 9 36   HEMOGLOBIN g/dL 13 4   HEMATOCRIT % 40 6   PLATELETS Thousands/uL 227   NEUTROS PCT % 80*   LYMPHS PCT % 10*   MONOS PCT % 8   EOS PCT % 2     Results from last 7 days   Lab Units 22   POTASSIUM mmol/L 3 9   CHLORIDE mmol/L 106   CO2 mmol/L 25   BUN mg/dL 15   CREATININE mg/dL 1 09   CALCIUM mg/dL 8 8   ALK PHOS U/L 103   ALT U/L 35   AST U/L 23             Imaging: I have personally reviewed pertinent reports  CT Adomen and pelvis is pending  No results found  ** Please Note: Dragon 360 Dictation voice to text software was used in the creation of this document   **

## 2022-08-22 NOTE — TELEPHONE ENCOUNTER
Patients wife would like the office to know that her  is currently in patient at [facility] AND [facility]

## 2022-08-22 NOTE — PROGRESS NOTES
[address] [facility]  Progress Note - Andriy Constant 1960, 60 y o  male MRN: [id number]  Unit/Bed#: [facility] 824-01 Encounter: [id number]  Primary Care Provider: Bib Perrin DO   Date and time admitted to hospital: 8/21/2022  7:22 PM    Hepatic steatosis  Assessment & Plan  · Incidental finding on CT abdomen and pelvis done 8/21  · Not clinically relevant to presentation  · Patient showed have follow-up with PCP in consideration of referral to GI outpatient if needed    H/O radical prostatectomy  Assessment & Plan  · Urology consult  Prostate cancer [facility])  Assessment & Plan  · Status post prostatectomy; awaiting further urology opinion  Gastroesophageal reflux disease without esophagitis  Assessment & Plan  · Maalox prn    Hyperlipemia  Assessment & Plan  · Continue statin    HTN (hypertension)  Assessment & Plan  · Continue ARB    * Fever in adult  Assessment & Plan  · Patient presented with fever, lactate normal in hemodynamically stable  · Per discussion with Urology, likely UTI and not related to imaging findings post-prostatectomy  · Continue IV ceftriaxone  · Follow-up blood and urine cultures        VTE Pharmacologic Prophylaxis:   Moderate Risk (Score 3-4) - Pharmacological DVT Prophylaxis Ordered: enoxaparin (Lovenox)  Patient Centered Rounds: I performed bedside rounds with nursing staff today  Discussions with Specialists or Other Care Team Provider:  Urology    Education and Discussions with Family / Patient: Updated  (wife) at bedside  Time Spent for Care: 30 minutes  More than 50% of total time spent on counseling and coordination of care as described above  Current Length of Stay: 1 day(s)  Current Patient Status: Inpatient   Certification Statement: The patient will continue to require additional inpatient hospital stay due to As above  Discharge Plan: Anticipate discharge in 48-72 hrs to home      Code Status: Level 1 - Full Code    Subjective:   Patient denies acute concerns, feels much better than when he came to the hospital specially since his fever has broken  No lightheadedness, chest pain, dyspnea, nausea/vomiting, abdominal pain  Objective:     Vitals:   Temp (24hrs), Av 2 °F (37 3 °C), Min:97 8 °F (36 6 °C), Max:100 7 °F (38 2 °C)    Temp:  [97 8 °F (36 6 °C)-100 7 °F (38 2 °C)] 98 9 °F (37 2 °C)  HR:  [68-86] 69  Resp:  [16-18] 18  BP: (137-174)/() 137/82  SpO2:  [93 %-96 %] 94 %  Body mass index is 34 5 kg/m²  Input and Output Summary (last 24 hours): Intake/Output Summary (Last 24 hours) at 2022 1742  Last data filed at 2022 1635  Gross per 24 hour   Intake 1643 75 ml   Output 6100 ml   Net -4456 25 ml       Physical Exam:   Physical Exam  Vitals reviewed  Constitutional:       General: He is not in acute distress  Appearance: He is not toxic-appearing  HENT:      Mouth/Throat:      Mouth: Mucous membranes are moist    Eyes:      General: No scleral icterus  Cardiovascular:      Rate and Rhythm: Normal rate and regular rhythm  Pulmonary:      Effort: Pulmonary effort is normal       Breath sounds: Normal breath sounds  Abdominal:      General: Bowel sounds are normal       Palpations: Abdomen is soft  Tenderness: There is no abdominal tenderness  Musculoskeletal:      Right lower leg: No edema  Left lower leg: No edema  Skin:     General: Skin is warm and dry  Neurological:      General: No focal deficit present  Mental Status: He is alert and oriented to person, place, and time     Psychiatric:         Mood and Affect: Mood normal          Behavior: Behavior normal           Additional Data:     Labs:  Results from last 7 days   Lab Units 22  0527   WBC Thousand/uL 9 82   HEMOGLOBIN g/dL 13 0   HEMATOCRIT % 38 8   PLATELETS Thousands/uL 224   NEUTROS PCT % 81*   LYMPHS PCT % 9*   MONOS PCT % 9   EOS PCT % 1     Results from last 7 days   Lab Units 08/22/22  0527   SODIUM mmol/L 137   POTASSIUM mmol/L 3 8   CHLORIDE mmol/L 107   CO2 mmol/L 23   BUN mg/dL 12   CREATININE mg/dL 1 00   ANION GAP mmol/L 7   CALCIUM mg/dL 8 3   ALBUMIN g/dL 2 8*   TOTAL BILIRUBIN mg/dL 0 82   ALK PHOS U/L 100   ALT U/L 31   AST U/L 18   GLUCOSE RANDOM mg/dL 106                 Results from last 7 days   Lab Units 08/22/22  0527 08/21/22 2056   LACTIC ACID mmol/L  --  0 9   PROCALCITONIN ng/ml 0 16  --        Lines/Drains:  Invasive Devices  Report    Peripheral Intravenous Line  Duration           Peripheral IV 08/22/22 Left;Ventral (anterior) Forearm <1 day          Drain  Duration           Urethral Catheter Non-latex 20 Fr  11 days              Urinary Catheter:  Goal for removal: N/A- Discharging with Ramachandran               Imaging: Reviewed radiology reports from this admission including: abdominal/pelvic CT    Recent Cultures (last 7 days):   Results from last 7 days   Lab Units 08/21/22 2101 08/21/22 2056   BLOOD CULTURE  Received in Microbiology Lab  Culture in Progress  Received in Microbiology Lab  Culture in Progress         Last 24 Hours Medication List:   Current Facility-Administered Medications   Medication Dose Route Frequency Provider Last Rate    acetaminophen  650 mg Oral Q6H PRN Lisa Arambula MD      aluminum-magnesium hydroxide-simethicone  30 mL Oral Q6H PRN Lisa Arambula MD      cefTRIAXone  1,000 mg Intravenous Q24H Lisa Arambula MD 1,000 mg (08/22/22 0346)    docusate sodium  100 mg Oral BID PRN Lisa Arambula MD      enoxaparin  40 mg Subcutaneous Daily Lisa Arambula MD      fluticasone  2 puff Inhalation Q12H [address] Yousuf García MD      HYDROmorphone  0 2 mg Intravenous Q4H PRN Lisa Arambula MD      losartan  25 mg Oral Daily Lisa Arambula MD      montelukast  10 mg Oral HS Lisa Arambula MD      multi-electrolyte  125 mL/hr Intravenous Continuous Lisa Arambula  mL/hr (08/22/22 9849)    ondansetron  4 mg Intravenous Q6H PRN Tello Mesa MD      oxybutynin  5 mg Oral Q8H PRN Tello Mesa MD      oxyCODONE  2 5 mg Oral Q4H PRN Tello Mesa MD      oxyCODONE  5 mg Oral Q4H PRN Tello Mesa MD      pravastatin  40 mg Oral Daily With Braden Brown MD          Today, Patient Was Seen By: Demetri Colvin MD    **Please Note: This note may have been constructed using a voice recognition system  **

## 2022-08-22 NOTE — PLAN OF CARE
Problem: Potential for Falls  Goal: Patient will remain free of falls  Description: INTERVENTIONS:  - Educate patient/family on patient safety including physical limitations  - Instruct patient to call for assistance with activity   - Consult OT/PT to assist with strengthening/mobility   - Keep Call bell within reach  - Keep bed low and locked with side rails adjusted as appropriate  - Keep care items and personal belongings within reach  - Initiate and maintain comfort rounds  - Make Fall Risk Sign visible to staff  - Apply yellow socks and bracelet for high fall risk patients  - Consider moving patient to room near nurses station  Outcome: Progressing     Problem: PAIN - ADULT  Goal: Verbalizes/displays adequate comfort level or baseline comfort level  Description: Interventions:  - Encourage patient to monitor pain and request assistance  - Assess pain using appropriate pain scale  - Administer analgesics based on type and severity of pain and evaluate response  - Implement non-pharmacological measures as appropriate and evaluate response  - Consider cultural and social influences on pain and pain management  - Notify physician/advanced practitioner if interventions unsuccessful or patient reports new pain  Outcome: Progressing     Problem: INFECTION - ADULT  Goal: Absence or prevention of progression during hospitalization  Description: INTERVENTIONS:  - Assess and monitor for signs and symptoms of infection  - Monitor lab/diagnostic results  - Monitor all insertion sites, i e  indwelling lines, tubes, and drains  - Monitor endotracheal if appropriate and nasal secretions for changes in amount and color  - [place] appropriate cooling/warming therapies per order  - Administer medications as ordered  - Instruct and encourage patient and family to use good hand hygiene technique  - Identify and instruct in appropriate isolation precautions for identified infection/condition  Outcome: Progressing  Goal: Absence of fever/infection during neutropenic period  Description: INTERVENTIONS:  - Monitor WBC    Outcome: Progressing     Problem: SAFETY ADULT  Goal: Patient will remain free of falls  Description: INTERVENTIONS:  - Educate patient/family on patient safety including physical limitations  - Instruct patient to call for assistance with activity   - Consult OT/PT to assist with strengthening/mobility   - Keep Call bell within reach  - Keep bed low and locked with side rails adjusted as appropriate  - Keep care items and personal belongings within reach  - Initiate and maintain comfort rounds  - Make Fall Risk Sign visible to staff  - Apply yellow socks and bracelet for high fall risk patients  - Consider moving patient to room near nurses station  Outcome: Progressing  Goal: Maintain or return to baseline ADL function  Description: INTERVENTIONS:  -  Assess patient's ability to carry out ADLs; assess patient's baseline for ADL function and identify physical deficits which impact ability to perform ADLs (bathing, care of mouth/teeth, toileting, grooming, dressing, etc )  - Assess/evaluate cause of self-care deficits   - Assess range of motion  - Assess patient's mobility; develop plan if impaired  - Assess patient's need for assistive devices and provide as appropriate  - Encourage maximum independence but intervene and supervise when necessary  - Involve family in performance of ADLs  - Assess for home care needs following discharge   - Consider OT consult to assist with ADL evaluation and planning for discharge  - Provide patient education as appropriate  Outcome: Progressing  Goal: Maintains/Returns to pre admission functional level  Description: INTERVENTIONS:  - Perform BMAT or MOVE assessment daily    - Set and communicate daily mobility goal to care team and patient/family/caregiver     - Collaborate with rehabilitation services on mobility goals if consulted  - Record patient progress and toleration of activity level Outcome: Progressing     Problem: DISCHARGE PLANNING  Goal: Discharge to home or other facility with appropriate resources  Description: INTERVENTIONS:  - Identify barriers to discharge w/patient and caregiver  - Arrange for needed discharge resources and transportation as appropriate  - Identify discharge learning needs (meds, wound care, etc )  - Arrange for interpretive services to assist at discharge as needed  - Refer to Case Management Department for coordinating discharge planning if the patient needs post-hospital services based on physician/advanced practitioner order or complex needs related to functional status, cognitive ability, or social support system  Outcome: Progressing     Problem: Knowledge Deficit  Goal: Patient/family/caregiver demonstrates understanding of disease process, treatment plan, medications, and discharge instructions  Description: Complete learning assessment and assess knowledge base    Interventions:  - Provide teaching at level of understanding  - Provide teaching via preferred learning methods  Outcome: Progressing     Problem: Prexisting or High Potential for Compromised Skin Integrity  Goal: Skin integrity is maintained or improved  Description: INTERVENTIONS:  - Identify patients at risk for skin breakdown  - Assess and monitor skin integrity  - Assess and monitor nutrition and hydration status  - Monitor labs   - Assess for incontinence   - Turn and reposition patient  - Assist with mobility/ambulation  - Relieve pressure over bony prominences  - Avoid friction and shearing  - Provide appropriate hygiene as needed including keeping skin clean and dry  - Evaluate need for skin moisturizer/barrier cream  - Collaborate with interdisciplinary team   - Patient/family teaching  - Consider wound care consult   Outcome: Progressing

## 2022-08-22 NOTE — ASSESSMENT & PLAN NOTE
Blood cultures sent x2  Lactic acid  Urine culture  Procalcitonin  Patient admitted in lieu of fever and to observe temperatures and cultures  Urology is consulted  After blood work and blood cultures sent; urine culture, will administer Rocephin  Update:  Lactic acid 0 9  Vital signs have been stable  BUN creatinine 15 and 1 09; will continue fluids and antibiotic Rocephin

## 2022-08-22 NOTE — UTILIZATION REVIEW
Initial Clinical Review    Admission: Date/Time/Statement:   Admission Orders (From admission, onward)     Ordered        08/21/22 2039  Inpatient Admission  Once                      Orders Placed This Encounter   Procedures    Inpatient Admission     Standing Status:   Standing     Number of Occurrences:   1     Order Specific Question:   Level of Care     Answer:   Med Surg [16]     Order Specific Question:   Estimated length of stay     Answer:   More than 2 Midnights     Order Specific Question:   Certification     Answer:   I certify that inpatient services are medically necessary for this patient for a duration of greater than two midnights  See H&P and MD Progress Notes for additional information about the patient's course of treatment  ED Arrival Information     Expected   8/21/2022 18:30    Arrival   8/21/2022 18:28    Acuity   Emergent            Means of arrival   Walk-In    Escorted by   Spouse    Service   Hospitalist    Admission type   Emergency            Arrival complaint   Fever, Post-op Problem           Chief Complaint   Patient presents with    Post-op Problem     Pt had radical prostatectomy 10 days ago, today around 1pm started with violent chills and pt temp was 101 at home as well as increased pain of the pelvic area  Pt noted urine in his malave bag appeared a little cloudy as well  Initial Presentation: 60 y o  male with PMH of GERD, HTN, HLD, vit D deficiency, prostate CA who underwent radical prostatectomy on the 11th of August presents to [facility] ED via personal vehicle with c/o low abd pain located mainly under testicle since Friday following a long car ride also noted urine in bag is cloudy, fever and chills with temp 101 8  Urology recommend going to ED for eval   See CT results below  Admit Inpatient med surg d/t fever:  Urology and IR consult, keep NPO, f/u on blood and urine cxs, lactic acid and procal   Start iv abx, continue ivf, continue home meds      Date: 8/22   Day 2: Urology Consult  Malave catheter has been draining clear yellow urine  Burning and discharge at meatus  Fever broke overnight he feels much better today  Only has pain when sitting, no pain lying down at current time  He lifts his right buttock when sitting because that's where the pain hits him in the perineum  Given the surgical history urinoma vs hematoma is suspected  Consider exchanging his urethral Malave catheter in the operating room with a modified catheter if cystogram confirms communication to bladder  Maintain NPO, malave catheter, IR consult for perc drain insertion  Continue iv abx     8/22 IR Consult:  CT abdomen pelvis with right obturator internus muscle fluid collection 1 9 x 6 9cm  Uunfortunately, collection in deep position  Would be at higher risk for damaging of surrounding structures with drain placement  Would recommend conservative management at this time  If patient does not improve, would recommend follow up CT in a few days to assess if collection easier to access      ED Triage Vitals [08/21/22 1852]   Temperature Pulse Respirations Blood Pressure SpO2   97 8 °F (36 6 °C) 86 18 (!) 155/106 96 %      Temp Source Heart Rate Source Patient Position - Orthostatic VS BP Location FiO2 (%)   Temporal Monitor Standing Right arm --      Pain Score       8          Wt Readings from Last 1 Encounters:   08/21/22 106 kg (233 lb 9 6 oz)     Additional Vital Signs:   Date/Time Temp Pulse Resp BP MAP (mmHg) SpO2 O2 Device Patient Position - Orthostatic VS   08/22/22 07:27:53 98 5 °F (36 9 °C) 75 17 138/83 101 93 % None (Room air) --   08/22/22 02:40:46 100 7 °F (38 2 °C) Abnormal  80 16 140/83 102 93 % -- --   08/21/22 23:33:02 98 1 °F (36 7 °C) 75 -- -- -- 94 % -- --   08/21/22 22:14:49 100 1 °F (37 8 °C) 68 -- 174/89 Abnormal  117 95 % -- --   08/21/22 2117 100 3 °F (37 9 °C) -- -- -- -- -- -- --   08/21/22 1852 97 8 °F (36 6 °C) 86 18 155/106 Abnormal  -- 96 % None (Room air) Standing Pertinent Labs/Diagnostic Test Results:   CT abdomen pelvis with contrast   Final Result by Delfin Thomas MD (45/98 6546)      1  Fluid collection with gas along the right obturator internus muscle, possibly postoperative as no enhancing wall is visualized  Early abscess cannot be entirely excluded  2   Fullness in the bilateral collecting systems and ureters with periureteral stranding and ureteral enhancement suspicious for ascending urinary tract infection in the setting of cystitis  3   Mild hepatic steatosis        Findings are consistent with the preliminary report from [facility] which was provided shortly after completion of the exam          Workstation performed: [id number]               Results from last 7 days   Lab Units 08/22/22 0527 08/21/22 2056   WBC Thousand/uL 9 82 9 36   HEMOGLOBIN g/dL 13 0 13 4   HEMATOCRIT % 38 8 40 6   PLATELETS Thousands/uL 224 227   NEUTROS ABS Thousands/µL 7 89* 7 44         Results from last 7 days   Lab Units 08/22/22 0527 08/21/22 2056   SODIUM mmol/L 137 135   POTASSIUM mmol/L 3 8 3 9   CHLORIDE mmol/L 107 106   CO2 mmol/L 23 25   ANION GAP mmol/L 7 4   BUN mg/dL 12 15   CREATININE mg/dL 1 00 1 09   EGFR ml/min/1 73sq m 79 71   CALCIUM mg/dL 8 3 8 8   MAGNESIUM mg/dL 2 4  --    PHOSPHORUS mg/dL 3 2  --      Results from last 7 days   Lab Units 08/22/22 0527 08/21/22 2056   AST U/L 18 23   ALT U/L 31 35   ALK PHOS U/L 100 103   TOTAL PROTEIN g/dL 6 6 7 0   ALBUMIN g/dL 2 8* 3 3*   TOTAL BILIRUBIN mg/dL 0 82 0 77     Results from last 7 days   Lab Units 08/22/22 0527 08/21/22 2056   GLUCOSE RANDOM mg/dL 106 105     Results from last 7 days   Lab Units 08/22/22 0527   PROCALCITONIN ng/ml 0 16     Results from last 7 days   Lab Units 08/21/22 2056   LACTIC ACID mmol/L 0 9     Results from last 7 days   Lab Units 08/21/22 2114   CLARITY UA  Turbid   COLOR UA  Light Yellow   SPEC GRAV UA  1 007   PH UA  6 0   GLUCOSE UA mg/dl Negative KETONES UA mg/dl Negative   BLOOD UA  Moderate*   PROTEIN UA mg/dl 30 (1+)*   NITRITE UA  Positive*   BILIRUBIN UA  Negative   UROBILINOGEN UA (BE) mg/dl <2 0   LEUKOCYTES UA  Large*   WBC UA /hpf Innumerable*   RBC UA /hpf 2-4*   BACTERIA UA /hpf Moderate*   EPITHELIAL CELLS WET PREP /hpf Occasional     Results from last 7 days   Lab Units 08/21/22 2101 08/21/22 2056   BLOOD CULTURE  Received in Microbiology Lab  Culture in Progress  Received in Microbiology Lab  Culture in Progress  ED Treatment:   Medication Administration from 08/21/2022 1749 to 08/21/2022 2138       Date/Time Order Dose Route Action     08/21/2022 2107 acetaminophen (TYLENOL) tablet 650 mg 650 mg Oral Given     08/21/2022 2108 morphine injection 4 mg 4 mg Intravenous Given        Past Medical History:   Diagnosis Date    Anxiety     Cancer ([facility] ) 6/15/22    Prostatic    GERD (gastroesophageal reflux disease) 2001    HL (hearing loss)     B/L    Hyperlipidemia     Hypertension     Obesity      Present on Admission:   Gastroesophageal reflux disease without esophagitis   HTN (hypertension)   Hyperlipemia   Prostate cancer ([place] [address] )      Admitting Diagnosis: Post-operative complication [D79  9XXA]  H/O radical prostatectomy [Z90 79]  Fever in adult [R50 9]  Age/Sex: 60 y o  male  Admission Orders:  Scheduled Medications:  cefTRIAXone, 1,000 mg, Intravenous, Q24H  enoxaparin, 40 mg, Subcutaneous, Daily  fluticasone, 2 puff, Inhalation, Q12H CHANEL  losartan, 25 mg, Oral, Daily  montelukast, 10 mg, Oral, HS  pravastatin, 40 mg, Oral, Daily With Dinner      Continuous IV Infusions:  multi-electrolyte, 125 mL/hr, Intravenous, Continuous      PRN Meds:  acetaminophen, 650 mg, Oral, Q6H PRN x1 thus far  aluminum-magnesium hydroxide-simethicone, 30 mL, Oral, Q6H PRN  docusate sodium, 100 mg, Oral, BID PRN  HYDROmorphone, 0 2 mg, Intravenous, Q4H PRN  ondansetron, 4 mg, Intravenous, Q6H PRN  oxybutynin, 5 mg, Oral, Q8H PRN x1 thus far  oxyCODONE, 2 5 mg, Oral, Q4H PRN  oxyCODONE, 5 mg, Oral, Q4H PRN    scd  IO, daily wts    IP CONSULT TO UROLOGY  IP CONSULT TO CASE MANAGEMENT  INPATIENT CONSULT TO IR    Network Utilization Review Department  ATTENTION: Please call with any questions or concerns to [phone] and carefully listen to the prompts so that you are directed to the right person  All voicemails are confidential   Tyler De Los Santos all requests for admission clinical reviews, approved or denied determinations and any other requests to dedicated fax number below belonging to the campus where the patient is receiving treatment   List of dedicated fax numbers for the Facilities:  [address] DENIALS (Administrative/Medical Necessity) [phone]   [address] (Maternity/NICU/Pediatrics) [phone]   [address]  [address] [id number] [place] [address] [address] Zulma [address] [facility] [place] [address] [phone]

## 2022-08-22 NOTE — CASE MANAGEMENT
Case Management Assessment & Discharge Planning Note    Patient name Dru Devries  [facility] MRN [id number]  : 1960 Date 2022       Current Admission Date: 2022  Current Admission Diagnosis:Fever in adult   Patient Active Problem List    Diagnosis Date Noted    Fever in adult 2022    H/O radical prostatectomy 2022    Gastroesophageal reflux disease without esophagitis 2022    Elevated BP without diagnosis of hypertension 2022    Benign essential tremor 2022    [facility]ra[facility]ricular tachycardia) ([facility] ) 2022    Prostate cancer ([facility] ) 06/15/2022    Abnormal MRI 2022    Abnormality detected on rectal examination of prostate 2022    Family history of early CAD 2020    HTN (hypertension) 2020    MRI of brain abnormal 2017    Vitamin D deficiency 10/13/2016    Myelopathy ([facility] ) 2015    Tremor 2015    Hyperlipemia 1999      LOS (days): 1  Geometric Mean LOS (GMLOS) (days):   Days to GMLOS:     OBJECTIVE:    Risk of Unplanned Readmission Score: 8 5         Current admission status: Inpatient       Preferred Pharmacy:   [address] [place] [address]  Phone: [phone] Fax: [phone], [address] [place] [address]  Phone: [phone] Fax: [phone]    Primary Care Provider: Carmel Cortez DO    Primary Insurance: BLUE CROSS  Secondary Insurance:     ASSESSMENT:  Active Health Care Proxies    There are no active Health Care Proxies on file         Advance Directives  Does patient have a Health Care POA?: Yes  Does patient have Advance Directives?: Yes  Advance Directives: Living will  Primary Contact: wife Tracy Ryan              Patient Information  Admitted from[de-identified] Home  Mental Status: Alert  During Assessment patient was accompanied by: Not accompanied during assessment  Assessment information provided by[de-identified] Patient  Primary Caregiver: Self  Support Systems: Spouse/significant other, Son, Daughter  Home entry access options   Select all that apply : No steps to enter home  Type of Current Residence: 2 story home  Upon entering residence, is there a bedroom on the main floor (no further steps)?: No  A bedroom is located on the following floor levels of residence (select all that apply):: 2nd Floor  Upon entering residence, is there a bathroom on the main floor (no further steps)?: Yes  Number of steps to 2nd floor from main floor: One Flight  In the last 12 months, was there a time when you were not able to pay the mortgage or rent on time?: No  In the last 12 months, how many places have you lived?: 1  In the last 12 months, was there a time when you did not have a steady place to sleep or slept in a shelter (including now)?: No  Homeless/housing insecurity resource given?: N/A  Living Arrangements: Lives w/ Spouse/significant other    Activities of Daily Living Prior to Admission  Functional Status: Independent  Completes ADLs independently?: Yes  Ambulates independently?: Yes  Does patient use assisted devices?: No  Does patient currently own DME?: No  Does patient have a history of Outpatient Therapy (PT/OT)?: No  Does the patient have a history of Short-Term Rehab?: No  Does patient have a history of HHC?: No         Patient Information Continued  Income Source: Employed  Does patient have prescription coverage?: Yes  Within the past 12 months, you worried that your food would run out before you got the money to buy more : Never true  Within the past 12 months, the food you bought just didn't last and you didn't have money to get more : Never true  Food insecurity resource given?: N/A  Does patient receive dialysis treatments?: No  Does patient have a history of substance abuse?: No  Does patient have a history of Mental Health Diagnosis?: No         Means of Transportation  Means of Transport to Appts[de-identified] Drives Self  In the past 12 months, has lack of transportation kept you from medical appointments or from getting medications?: No  In the past 12 months, has lack of transportation kept you from meetings, work, or from getting things needed for daily living?: No  Was application for public transport provided?: N/A        DISCHARGE DETAILS:    Discharge planning discussed with[de-identified] pt  Freedom of Choice: Yes                   Contacts  Patient Contacts: wife Latasha Xavier  Relationship to Patient[de-identified] Family  Contact Method: Phone  Phone Number: [phone]  Reason/Outcome: Continuity of Care, Emergency Contact, Discharge Planning                   Would you like to participate in our [address] service program?  : No - Declined     CM reviewed d/c planning process including the following: identifying help at home, patient preference for d/c planning needs, Discharge Lounge, [facility] Meds to Bed program, availability of treatment team to discuss questions or concerns patient and/or family may have regarding understanding medications and recognizing signs and symptoms once discharged  CM also encouraged patient to follow up with all recommended appointments after discharge  Patient advised of importance for patient and family to participate in managing patients medical well being  Patient/caregiver received discharge checklist   Content reviewed  Patient/caregiver encouraged to participate in discharge plan of care prior to discharge home

## 2022-08-22 NOTE — ASSESSMENT & PLAN NOTE
· Patient presented with fever, lactate normal in hemodynamically stable  · Per discussion with Urology, likely UTI and not related to imaging findings post-prostatectomy  · Continue IV ceftriaxone  · Follow-up blood and urine cultures

## 2022-08-23 LAB
ANION GAP SERPL CALCULATED.3IONS-SCNC: 5 MMOL/L (ref 4–13)
BUN SERPL-MCNC: 10 MG/DL (ref 5–25)
CALCIUM SERPL-MCNC: 8.6 MG/DL (ref 8.3–10.1)
CHLORIDE SERPL-SCNC: 108 MMOL/L (ref 96–108)
CO2 SERPL-SCNC: 25 MMOL/L (ref 21–32)
CREAT SERPL-MCNC: 0.97 MG/DL (ref 0.6–1.3)
GFR SERPL CREATININE-BSD FRML MDRD: 82 ML/MIN/1.73SQ M
GLUCOSE SERPL-MCNC: 95 MG/DL (ref 65–140)
POTASSIUM SERPL-SCNC: 4.2 MMOL/L (ref 3.5–5.3)
SODIUM SERPL-SCNC: 138 MMOL/L (ref 135–147)

## 2022-08-23 PROCEDURE — NC001 PR NO CHARGE: Performed by: UROLOGY

## 2022-08-23 PROCEDURE — 99232 SBSQ HOSP IP/OBS MODERATE 35: CPT | Performed by: FAMILY MEDICINE

## 2022-08-23 PROCEDURE — 80048 BASIC METABOLIC PNL TOTAL CA: CPT | Performed by: STUDENT IN AN ORGANIZED HEALTH CARE EDUCATION/TRAINING PROGRAM

## 2022-08-23 PROCEDURE — BT14ZZZ FLUOROSCOPY OF KIDNEYS, URETERS AND BLADDER: ICD-10-PCS | Performed by: UROLOGY

## 2022-08-23 RX ORDER — SACCHAROMYCES BOULARDII 250 MG
250 CAPSULE ORAL 2 TIMES DAILY
Status: DISCONTINUED | OUTPATIENT
Start: 2022-08-23 | End: 2022-08-25 | Stop reason: HOSPADM

## 2022-08-23 RX ORDER — SODIUM CHLORIDE, SODIUM GLUCONATE, SODIUM ACETATE, POTASSIUM CHLORIDE, MAGNESIUM CHLORIDE, SODIUM PHOSPHATE, DIBASIC, AND POTASSIUM PHOSPHATE .53; .5; .37; .037; .03; .012; .00082 G/100ML; G/100ML; G/100ML; G/100ML; G/100ML; G/100ML; G/100ML
100 INJECTION, SOLUTION INTRAVENOUS CONTINUOUS
Status: DISCONTINUED | OUTPATIENT
Start: 2022-08-23 | End: 2022-08-24

## 2022-08-23 RX ADMIN — CEFTRIAXONE 1000 MG: 10 INJECTION, POWDER, FOR SOLUTION INTRAVENOUS at 20:51

## 2022-08-23 RX ADMIN — PRAVASTATIN SODIUM 40 MG: 40 TABLET ORAL at 17:22

## 2022-08-23 RX ADMIN — LOSARTAN POTASSIUM 25 MG: 25 TABLET, FILM COATED ORAL at 10:32

## 2022-08-23 RX ADMIN — Medication 250 MG: at 17:36

## 2022-08-23 RX ADMIN — Medication 250 MG: at 10:41

## 2022-08-23 RX ADMIN — SODIUM CHLORIDE, SODIUM GLUCONATE, SODIUM ACETATE, POTASSIUM CHLORIDE, MAGNESIUM CHLORIDE, SODIUM PHOSPHATE, DIBASIC, AND POTASSIUM PHOSPHATE 100 ML/HR: .53; .5; .37; .037; .03; .012; .00082 INJECTION, SOLUTION INTRAVENOUS at 10:33

## 2022-08-23 RX ADMIN — ENOXAPARIN SODIUM 40 MG: 40 INJECTION SUBCUTANEOUS at 10:32

## 2022-08-23 RX ADMIN — DOCUSATE SODIUM 100 MG: 100 CAPSULE, LIQUID FILLED ORAL at 10:32

## 2022-08-23 RX ADMIN — OXYBUTYNIN CHLORIDE 5 MG: 5 TABLET ORAL at 06:48

## 2022-08-23 RX ADMIN — SODIUM CHLORIDE, SODIUM GLUCONATE, SODIUM ACETATE, POTASSIUM CHLORIDE, MAGNESIUM CHLORIDE, SODIUM PHOSPHATE, DIBASIC, AND POTASSIUM PHOSPHATE 125 ML/HR: .53; .5; .37; .037; .03; .012; .00082 INJECTION, SOLUTION INTRAVENOUS at 01:22

## 2022-08-23 RX ADMIN — ACETAMINOPHEN 650 MG: 325 TABLET ORAL at 06:48

## 2022-08-23 RX ADMIN — Medication 250 MG: at 17:22

## 2022-08-23 RX ADMIN — PRAVASTATIN SODIUM 40 MG: 40 TABLET ORAL at 17:36

## 2022-08-23 RX ADMIN — ACETAMINOPHEN 650 MG: 325 TABLET ORAL at 20:52

## 2022-08-23 NOTE — TELEPHONE ENCOUNTER
Pt is admitted in the [facility]  Central scheduling is wanting to know if CT CYSTOGRAM for tomorrow should be canceled or rescheduled? He is in [facility] but imaging was scheduled at [address] [place] [address]  Not sure if they can just do this at the [facility] or if we should reschedule or if the provider even feels it is neccessary any more?

## 2022-08-23 NOTE — TELEPHONE ENCOUNTER
Will have ct cystogram while inpatient tomorrow, reordered  Can cancel the outpatient one, will have inpatient

## 2022-08-23 NOTE — PROGRESS NOTES
UROLOGY PROGRESS NOTE   Patient Identifiers: Kamala Oliver (MRN [id number])  Date of Service: 8/23/2022    Assessment:   s/p robotic prostatectomy/bladder neck reconstruction 8/11/22 readmitted with fever    Plan:     Febrile source appears to be urinary infection  Preliminary urine culture is Gram-negative rods 100,000 colony-forming units  Patient is on empiric ceftriaxone  Having symptomatic improvement  Last low-grade temperature yesterday at 2 in the morning, 100 7  Has now been afebrile for more than 24 hours  Plan to continue ceftriaxone at this time  Will await final speciation and sensitivities of the urine culture to direct oral antibiotic therapy  Will plan for inpatient CT cystogram tomorrow to assess healing of the patient's difficult anastomosis  20 cc of fluid had been placed in the postoperative urinary catheter  I personally removed 5 cc today  There is some mild fullness of the bilateral collecting systems on admission and he continues to have some mild bladder spasms  Removal of 5 cc of fluid for a total of 15 cc in the Ramachandran catheter balloon may decrease spasms and improved ureteral drainage  Will add probiotics to the patient's regimen  Urology will follow for plan after tomorrow's inpatient CT cystogram     Subjective:     24 HR EVENTS:   Patient has not had a fever since yesterday at 2:00 a m , 100 7  Is feeling symptomatically improved  Has not had a bowel movement since Sunday  Ambulating in the halls        Objective:     VITALS:    Vitals:    08/22/22 2150   BP: 137/83   Pulse: 71   Resp: 16   Temp: 98 6 °F (37 °C)   SpO2: 94%       INS & OUTS:  4 7L urine output    LABS:  Lab Results   Component Value Date    HGB 13 0 08/22/2022    HCT 38 8 08/22/2022    WBC 9 82 08/22/2022     08/22/2022   ]    Lab Results   Component Value Date    K 4 2 08/23/2022     08/23/2022    CO2 25 08/23/2022    BUN 10 08/23/2022    CREATININE 0 97 08/23/2022    CALCIUM 8 6 08/23/2022   ]    INPATIENT MEDS:    Current Facility-Administered Medications:     acetaminophen (TYLENOL) tablet 650 mg, 650 mg, Oral, Q6H PRN, Ritu Castanon MD, 650 mg at 08/23/22 0648    aluminum-magnesium hydroxide-simethicone (MYLANTA) oral suspension 30 mL, 30 mL, Oral, Q6H PRN, Ritu Castanon MD, 30 mL at 08/22/22 1950    ceftriaxone (ROCEPHIN) 1 g/50 mL in dextrose IVPB, 1,000 mg, Intravenous, Q24H, Ritu Castanon MD, Last Rate: 100 mL/hr at 08/22/22 2137, 1,000 mg at 08/22/22 2137    docusate sodium (COLACE) capsule 100 mg, 100 mg, Oral, BID PRN, Ritu Castanon MD, 100 mg at 08/22/22 1523    enoxaparin (LOVENOX) subcutaneous injection 40 mg, 40 mg, Subcutaneous, Daily, Ritu Castanon MD, 40 mg at 08/22/22 0817    fluticasone (FLOVENT HFA) 110 MCG/ACT inhaler 2 puff, 2 puff, Inhalation, Q12H [facility], Ritu Castanon MD    HYDROmorphone HCl (DILAUDID) injection 0 2 mg, 0 2 mg, Intravenous, Q4H PRN, Ritu Castanon MD    losartan (COZAAR) tablet 25 mg, 25 mg, Oral, Daily, Ritu Castanon MD, 25 mg at 08/22/22 0817    montelukast (SINGULAIR) tablet 10 mg, 10 mg, Oral, HS, Ritu Castanon MD, 10 mg at 08/21/22 2337    multi-electrolyte (PLASMALYTE-A/ISOLYTE-S PH 7 4) IV solution, 125 mL/hr, Intravenous, Continuous, Ritu Castanon MD, Last Rate: 125 mL/hr at 08/23/22 0122, 125 mL/hr at 08/23/22 0122    ondansetron (ZOFRAN) injection 4 mg, 4 mg, Intravenous, Q6H PRN, Ritu Castanon MD    oxybutynin (DITROPAN) tablet 5 mg, 5 mg, Oral, Q8H PRN, Ritu Castanon MD, 5 mg at 08/23/22 0648    oxyCODONE (ROXICODONE) IR tablet 2 5 mg, 2 5 mg, Oral, Q4H PRN, Ritu Castanon MD    oxyCODONE (ROXICODONE) IR tablet 5 mg, 5 mg, Oral, Q4H PRN, Ritu Castanon MD    pravastatin (PRAVACHOL) tablet 40 mg, 40 mg, Oral, Daily With Phillip Sebastian MD, 40 mg at 08/22/22 1643      Physical Exam:   /83   Pulse 71   Temp 98 6 °F (37 °C)   Resp 16   Ht 5' 9" (1 753 m)   Wt 106 kg (233 lb 9 6 oz)   SpO2 94%   BMI 34 50 kg/m²   GEN: No acute distress nontoxic appearing  RESP: breathing comfortably with no accessory muscle use  CV: no significant peripheral edema lower extremities are soft and nontender  ABD: soft, non-tender, non-distended  INCISION: clean dry and intact  [place]: in place draining clear yellow urine    RADIOLOGY:   8/21/22  CT ABDOMEN AND PELVIS WITH IV CONTRAST     INDICATION:   post op prostatectomy  Fevers, chills, pelvic pain     COMPARISON:  None      TECHNIQUE:  CT examination of the abdomen and pelvis was performed  Axial, sagittal, and coronal 2D reformatted images were created from the source data and submitted for interpretation      Radiation dose length product (DLP) for this visit:  29-45-37-51 mGy-cm   This examination, like all CT scans performed in the [facility], was performed utilizing techniques to minimize radiation dose exposure, including the use of iterative   reconstruction and automated exposure control      IV Contrast:  70 mL of iohexol (OMNIPAQUE)  Enteric Contrast:  Enteric contrast was not administered      FINDINGS:     ABDOMEN     LOWER CHEST:  No clinically significant abnormality identified in the visualized lower chest      LIVER/BILIARY TREE:  Liver is diffusely decreased in density consistent with fatty change  No CT evidence of suspicious hepatic mass  Normal hepatic contours  No biliary dilatation      GALLBLADDER:  No calcified gallstones  No pericholecystic inflammatory change      SPLEEN:  Unremarkable      PANCREAS:  Unremarkable      ADRENAL GLANDS:  Unremarkable      KIDNEYS/URETERS:  There is mild fullness in the bilateral collecting systems and ureters to the level of the collapsed bladder with mild periureteral stranding and enhancement    Exophytic right renal cyst as seen on prior ultrasound      STOMACH AND BOWEL:  Unremarkable      APPENDIX:  No findings to suggest appendicitis      ABDOMINOPELVIC CAVITY: There is a fluid collection with gas along the obturator internus muscle on the right measuring 1 9 x 6 9 cm  There is no enhancing wall  No ascites  No pneumoperitoneum  No lymphadenopathy      VESSELS:  Unremarkable for patient's age      PELVIS     REPRODUCTIVE ORGANS:  Status post prostatectomy      URINARY BLADDER:  The bladder is collapsed with a Ramachandran catheter  There is circumferential wall thickening and adjacent stranding      ABDOMINAL WALL/INGUINAL REGIONS:  There are bilateral fat-containing inguinal hernias      OSSEOUS STRUCTURES:  No acute fracture or destructive osseous lesion      IMPRESSION:     1  Fluid collection with gas along the right obturator internus muscle, possibly postoperative as no enhancing wall is visualized  Early abscess cannot be entirely excluded      2  Fullness in the bilateral collecting systems and ureters with periureteral stranding and ureteral enhancement suspicious for ascending urinary tract infection in the setting of cystitis      3   Mild hepatic steatosis

## 2022-08-23 NOTE — TELEPHONE ENCOUNTER
Contacted central scheduling and cancelled outpatient CT cystogram for tomorrow, as patient will have imaging completed while inpatient

## 2022-08-23 NOTE — PLAN OF CARE
Problem: Potential for Falls  Goal: Patient will remain free of falls  Description: INTERVENTIONS:  - Educate patient/family on patient safety including physical limitations  - Instruct patient to call for assistance with activity   - Consult OT/PT to assist with strengthening/mobility   - Keep Call bell within reach  - Keep bed low and locked with side rails adjusted as appropriate  - Keep care items and personal belongings within reach  - Initiate and maintain comfort rounds  - Make Fall Risk Sign visible to staff    - Apply yellow socks and bracelet for high fall risk patients  - Consider moving patient to room near nurses station  Outcome: Progressing     Problem: PAIN - ADULT  Goal: Verbalizes/displays adequate comfort level or baseline comfort level  Description: Interventions:  - Encourage patient to monitor pain and request assistance  - Assess pain using appropriate pain scale  - Administer analgesics based on type and severity of pain and evaluate response  - Implement non-pharmacological measures as appropriate and evaluate response  - Consider cultural and social influences on pain and pain management  - Notify physician/advanced practitioner if interventions unsuccessful or patient reports new pain  Outcome: Progressing     Problem: INFECTION - ADULT  Goal: Absence or prevention of progression during hospitalization  Description: INTERVENTIONS:  - Assess and monitor for signs and symptoms of infection  - Monitor lab/diagnostic results  - Monitor all insertion sites, i e  indwelling lines, tubes, and drains  - Monitor endotracheal if appropriate and nasal secretions for changes in amount and color  - [place] appropriate cooling/warming therapies per order  - Administer medications as ordered  - Instruct and encourage patient and family to use good hand hygiene technique  - Identify and instruct in appropriate isolation precautions for identified infection/condition  Outcome: Progressing  Goal: Absence of fever/infection during neutropenic period  Description: INTERVENTIONS:  - Monitor WBC    Outcome: Progressing     Problem: SAFETY ADULT  Goal: Patient will remain free of falls  Description: INTERVENTIONS:  - Educate patient/family on patient safety including physical limitations  - Instruct patient to call for assistance with activity   - Consult OT/PT to assist with strengthening/mobility   - Keep Call bell within reach  - Keep bed low and locked with side rails adjusted as appropriate  - Keep care items and personal belongings within reach  - Initiate and maintain comfort rounds  - Make Fall Risk Sign visible to staff    - Apply yellow socks and bracelet for high fall risk patients  - Consider moving patient to room near nurses station  Outcome: Progressing  Goal: Maintain or return to baseline ADL function  Description: INTERVENTIONS:  -  Assess patient's ability to carry out ADLs; assess patient's baseline for ADL function and identify physical deficits which impact ability to perform ADLs (bathing, care of mouth/teeth, toileting, grooming, dressing, etc )  - Assess/evaluate cause of self-care deficits   - Assess range of motion  - Assess patient's mobility; develop plan if impaired  - Assess patient's need for assistive devices and provide as appropriate  - Encourage maximum independence but intervene and supervise when necessary  - Involve family in performance of ADLs  - Assess for home care needs following discharge   - Consider OT consult to assist with ADL evaluation and planning for discharge  - Provide patient education as appropriate  Outcome: Progressing  Goal: Maintains/Returns to pre admission functional level  Description: INTERVENTIONS:  - Perform BMAT or MOVE assessment daily    - Set and communicate daily mobility goal to care team and patient/family/caregiver     - Collaborate with rehabilitation services on mobility goals if consulted  - Out of bed for toileting  - Record patient progress and toleration of activity level   Outcome: Progressing     Problem: DISCHARGE PLANNING  Goal: Discharge to home or other facility with appropriate resources  Description: INTERVENTIONS:  - Identify barriers to discharge w/patient and caregiver  - Arrange for needed discharge resources and transportation as appropriate  - Identify discharge learning needs (meds, wound care, etc )  - Arrange for interpretive services to assist at discharge as needed  - Refer to Case Management Department for coordinating discharge planning if the patient needs post-hospital services based on physician/advanced practitioner order or complex needs related to functional status, cognitive ability, or social support system  Outcome: Progressing     Problem: Knowledge Deficit  Goal: Patient/family/caregiver demonstrates understanding of disease process, treatment plan, medications, and discharge instructions  Description: Complete learning assessment and assess knowledge base    Interventions:  - Provide teaching at level of understanding  - Provide teaching via preferred learning methods  Outcome: Progressing     Problem: Prexisting or High Potential for Compromised Skin Integrity  Goal: Skin integrity is maintained or improved  Description: INTERVENTIONS:  - Identify patients at risk for skin breakdown  - Assess and monitor skin integrity  - Assess and monitor nutrition and hydration status  - Monitor labs   - Assess for incontinence   - Turn and reposition patient  - Assist with mobility/ambulation  - Relieve pressure over bony prominences  - Avoid friction and shearing  - Provide appropriate hygiene as needed including keeping skin clean and dry  - Evaluate need for skin moisturizer/barrier cream  - Collaborate with interdisciplinary team   - Patient/family teaching  - Consider wound care consult   Outcome: Progressing

## 2022-08-23 NOTE — ASSESSMENT & PLAN NOTE
· Patient presented with fever, lactate normal in hemodynamically stable  · Per discussion with Urology, likely UTI and not related to imaging findings post-prostatectomy  · Continue IV ceftriaxone  · Follow-up blood and urine cultures-will transition to p o  antibiotics once the sensitivities back

## 2022-08-23 NOTE — PROGRESS NOTES
[address] [facility]  Progress Note - Rogerio Christensen 1960, 60 y o  male MRN: [id number]  Unit/Bed#: [facility] 824-01 Encounter: [id number]  Primary Care Provider: Vane Johnston DO   Date and time admitted to hospital: 8/21/2022  7:22 PM    * Fever in adult  Assessment & Plan  · Patient presented with fever, lactate normal in hemodynamically stable  · Per discussion with Urology, likely UTI and not related to imaging findings post-prostatectomy  · Continue IV ceftriaxone  · Follow-up blood and urine cultures-will transition to p o  antibiotics once the sensitivities back    Hepatic steatosis  Assessment & Plan  · Incidental finding on CT abdomen and pelvis done 8/21  · Not clinically relevant to presentation  · Patient showed have follow-up with PCP in consideration of referral to GI outpatient if needed    H/O radical prostatectomy  Assessment & Plan  · Urology consult  Prostate cancer [facility])  Assessment & Plan  · Status post prostatectomy; awaiting further urology opinion  Gastroesophageal reflux disease without esophagitis  Assessment & Plan  · Maalox prn    Hyperlipemia  Assessment & Plan  · Continue statin    HTN (hypertension)  Assessment & Plan  · Continue ARB        VTE Pharmacologic Prophylaxis:   Moderate Risk (Score 3-4) - Pharmacological DVT Prophylaxis Ordered: enoxaparin (Lovenox)  Patient Centered Rounds: I performed bedside rounds with nursing staff today  Discussions with Specialists or Other Care Team Provider:     Education and Discussions with Family / Patient: Updated  (wife) at bedside  Time Spent for Care: 30 minutes  More than 50% of total time spent on counseling and coordination of care as described above      Current Length of Stay: 2 day(s)  Current Patient Status: Inpatient   Certification Statement:  Patient need continued inpatient stay due to IV antibiotics and cystogram tomorrow  Discharge Plan: Anticipate discharge tomorrow to home     Code Status: Level 1 - Full Code    Subjective:   Patient seen and examined  No events overnight    Objective:     Vitals:   Temp (24hrs), Av 4 °F (36 9 °C), Min:97 9 °F (36 6 °C), Max:98 7 °F (37 1 °C)    Temp:  [97 9 °F (36 6 °C)-98 7 °F (37 1 °C)] 98 7 °F (37 1 °C)  HR:  [64-71] 64  Resp:  [16-20] 20  BP: (134-137)/(82-83) 134/82  SpO2:  [92 %-95 %] 95 %  Body mass index is 34 5 kg/m²  Input and Output Summary (last 24 hours): Intake/Output Summary (Last 24 hours) at 2022 1604  Last data filed at 2022 1540  Gross per 24 hour   Intake 3393 42 ml   Output 6500 ml   Net -3106 58 ml       Physical Exam:   Physical Exam  Constitutional:       General: He is not in acute distress  Appearance: Normal appearance  HENT:      Head: Normocephalic  Nose: Nose normal    Eyes:      General: No scleral icterus  Cardiovascular:      Rate and Rhythm: Regular rhythm  Heart sounds: Normal heart sounds  Pulmonary:      Breath sounds: Normal breath sounds  Abdominal:      General: There is no distension  Musculoskeletal:      Cervical back: Normal range of motion and neck supple  Skin:     General: Skin is warm  Neurological:      General: No focal deficit present  Mental Status: He is alert           Additional Data:     Labs:  Results from last 7 days   Lab Units 22  0527   WBC Thousand/uL 9 82   HEMOGLOBIN g/dL 13 0   HEMATOCRIT % 38 8   PLATELETS Thousands/uL 224   NEUTROS PCT % 81*   LYMPHS PCT % 9*   MONOS PCT % 9   EOS PCT % 1     Results from last 7 days   Lab Units 22  0657 22  0527   SODIUM mmol/L 138 137   POTASSIUM mmol/L 4 2 3 8   CHLORIDE mmol/L 108 107   CO2 mmol/L 25 23   BUN mg/dL 10 12   CREATININE mg/dL 0 97 1 00   ANION GAP mmol/L 5 7   CALCIUM mg/dL 8 6 8 3   ALBUMIN g/dL  --  2 8*   TOTAL BILIRUBIN mg/dL  --  0 82   ALK PHOS U/L  --  100   ALT U/L  --  31   AST U/L  --  18   GLUCOSE RANDOM mg/dL 95 106                 Results from last 7 days   Lab Units 08/22/22  0527 08/21/22 2056   LACTIC ACID mmol/L  --  0 9   PROCALCITONIN ng/ml 0 16  --        Lines/Drains:  Invasive Devices  Report    Peripheral Intravenous Line  Duration           Peripheral IV 08/22/22 Left;Ventral (anterior) Forearm 1 day          Drain  Duration           Urethral Catheter Non-latex 20 Fr  12 days              Urinary Catheter:  Goal for removal: N/A- Discharging with Ramachandran               Imaging: Reviewed radiology reports from this admission including: abdominal/pelvic CT    Recent Cultures (last 7 days):   Results from last 7 days   Lab Units 08/21/22 2114 08/21/22 2101 08/21/22 2056   BLOOD CULTURE   --  No Growth at 24 hrs  No Growth at 24 hrs     URINE CULTURE  >100,000 cfu/ml Klebsiella pneumoniae*  --   --        Last 24 Hours Medication List:   Current Facility-Administered Medications   Medication Dose Route Frequency Provider Last Rate    acetaminophen  650 mg Oral Q6H PRN Reinaldo Melgar MD      aluminum-magnesium hydroxide-simethicone  30 mL Oral Q6H PRN Reinaldo Melgar MD      cefTRIAXone  1,000 mg Intravenous Q24H Reinaldo Melgar MD 1,000 mg (08/22/22 2137)    docusate sodium  100 mg Oral BID PRN Reinaldo Melgar MD      enoxaparin  40 mg Subcutaneous Daily Reinaldo Melgar MD      fluticasone  2 puff Inhalation Q12H [facility] Yousuf Guthrie MD      HYDROmorphone  0 2 mg Intravenous Q4H PRN Reinaldo Melgar MD      losartan  25 mg Oral Daily Reinaldo Melgar MD      montelukast  10 mg Oral HS Reinaldo Melgar MD      multi-electrolyte  100 mL/hr Intravenous Continuous Michael Gilford,  mL/hr (08/23/22 1033)    ondansetron  4 mg Intravenous Q6H PRN Reinaldo Melgar MD      oxybutynin  5 mg Oral Q8H PRN Reinaldo Melgar MD      oxyCODONE  2 5 mg Oral Q4H PRN Reinaldo Melgar MD      oxyCODONE  5 mg Oral Q4H PRN Reinaldo Melgar MD      pravastatin  40 mg Oral Daily With Alysha Deluca MD      saccharomyces boulardii  250 mg Oral BID Allie Winston MD          Today, Patient Was Seen By: Jose Ramon Tang MD    **Please Note: This note may have been constructed using a voice recognition system  **

## 2022-08-23 NOTE — PLAN OF CARE
Problem: Potential for Falls  Goal: Patient will remain free of falls  Description: INTERVENTIONS:  - Educate patient/family on patient safety including physical limitations  - Instruct patient to call for assistance with activity   - Consult OT/PT to assist with strengthening/mobility   - Keep Call bell within reach  - Keep bed low and locked with side rails adjusted as appropriate  - Keep care items and personal belongings within reach  - Initiate and maintain comfort rounds  - Make Fall Risk Sign visible to staff  - Apply yellow socks and bracelet for high fall risk patients  - Consider moving patient to room near nurses station  Outcome: Progressing     Problem: PAIN - ADULT  Goal: Verbalizes/displays adequate comfort level or baseline comfort level  Description: Interventions:  - Encourage patient to monitor pain and request assistance  - Assess pain using appropriate pain scale  - Administer analgesics based on type and severity of pain and evaluate response  - Implement non-pharmacological measures as appropriate and evaluate response  - Consider cultural and social influences on pain and pain management  - Notify physician/advanced practitioner if interventions unsuccessful or patient reports new pain  Outcome: Progressing     Problem: INFECTION - ADULT  Goal: Absence or prevention of progression during hospitalization  Description: INTERVENTIONS:  - Assess and monitor for signs and symptoms of infection  - Monitor lab/diagnostic results  - Monitor all insertion sites, i e  indwelling lines, tubes, and drains  - Monitor endotracheal if appropriate and nasal secretions for changes in amount and color  - [place] appropriate cooling/warming therapies per order  - Administer medications as ordered  - Instruct and encourage patient and family to use good hand hygiene technique  - Identify and instruct in appropriate isolation precautions for identified infection/condition  Outcome: Progressing  Goal: Absence of fever/infection during neutropenic period  Description: INTERVENTIONS:  - Monitor WBC    Outcome: Progressing     Problem: SAFETY ADULT  Goal: Patient will remain free of falls  Description: INTERVENTIONS:  - Educate patient/family on patient safety including physical limitations  - Instruct patient to call for assistance with activity   - Consult OT/PT to assist with strengthening/mobility   - Keep Call bell within reach  - Keep bed low and locked with side rails adjusted as appropriate  - Keep care items and personal belongings within reach  - Initiate and maintain comfort rounds  - Make Fall Risk Sign visible to staff  - Apply yellow socks and bracelet for high fall risk patients  - Consider moving patient to room near nurses station  Outcome: Progressing  Goal: Maintain or return to baseline ADL function  Description: INTERVENTIONS:  -  Assess patient's ability to carry out ADLs; assess patient's baseline for ADL function and identify physical deficits which impact ability to perform ADLs (bathing, care of mouth/teeth, toileting, grooming, dressing, etc )  - Assess/evaluate cause of self-care deficits   - Assess range of motion  - Assess patient's mobility; develop plan if impaired  - Assess patient's need for assistive devices and provide as appropriate  - Encourage maximum independence but intervene and supervise when necessary  - Involve family in performance of ADLs  - Assess for home care needs following discharge   - Consider OT consult to assist with ADL evaluation and planning for discharge  - Provide patient education as appropriate  Outcome: Progressing  Goal: Maintains/Returns to pre admission functional level  Description: INTERVENTIONS:  - Perform BMAT or MOVE assessment daily    - Set and communicate daily mobility goal to care team and patient/family/caregiver     - Collaborate with rehabilitation services on mobility goals if consulted  - Out of bed for toileting  - Record patient progress and toleration of activity level   Outcome: Progressing     Problem: Prexisting or High Potential for Compromised Skin Integrity  Goal: Skin integrity is maintained or improved  Description: INTERVENTIONS:  - Identify patients at risk for skin breakdown  - Assess and monitor skin integrity  - Assess and monitor nutrition and hydration status  - Monitor labs   - Assess for incontinence   - Turn and reposition patient  - Assist with mobility/ambulation  - Relieve pressure over bony prominences  - Avoid friction and shearing  - Provide appropriate hygiene as needed including keeping skin clean and dry  - Evaluate need for skin moisturizer/barrier cream  - Collaborate with interdisciplinary team   - Patient/family teaching  - Consider wound care consult   Outcome: Progressing

## 2022-08-23 NOTE — UTILIZATION REVIEW
Continued Stay Review    Date: 8/23/2022                        Current Patient Class: inpatient  Current Level of Care: med/surg  HPI:62 y o  male initially admitted on 8/21 with fever  Assessment/Plan: Last fever 8/22 2 2 AM, 100 7  States he is feeling better  No bm in several days  Ambulating halls  Suspect fever course to be urinary infection  Preliminary urine culture is Gram-negative rods 100,000 colony-forming units  On empiric ceftriaxone, plan to continue at this time  Await final speciation and sensitivities of the urine culture to direct oral antibiotic therapy  Continue malave cath for now  Adding probiotics  Plan for CT cystogram      Vital Signs:   Date/Time Temp Pulse Resp BP MAP (mmHg) SpO2 O2 Device   08/23/22 1200 -- -- -- -- -- -- None (Room air)   08/23/22 07:39:59 97 9 °F (36 6 °C) 68 18 135/83 100 92 % --   08/22/22 21:50:21 98 6 °F (37 °C) 71 16 137/83 101 94 % --   08/22/22 1950 -- -- -- -- -- -- None (Room air)   08/22/22 15:19:14 98 9 °F (37 2 °C) 69 18 137/82 100 94 % --   08/22/22 07:27:53 98 5 °F (36 9 °C) 75 17 138/83 101 93 % None (Room air)   08/22/22 02:40:46 100 7 °F (38 2 °C) Abnormal  80 16 140/83 102 93 % --   08/21/22 23:33:02 98 1 °F (36 7 °C) 75 -- -- -- 94 % --   08/21/22 22:14:49 100 1 °F (37 8 °C) 68 -- 174/89 Abnormal  117 95 % --   08/21/22 2117 100 3 °F (37 9 °C) -- -- -- -- -- --   08/21/22 1852 97 8 °F (36 6 °C) 86 18 155/106 Abnormal  -- 96 % None (Room air)       Pertinent Labs/Diagnostic Results:      CT a/p 8/22:  1  Fluid collection with gas along the right obturator internus muscle, possibly postoperative as no enhancing wall is visualized  Early abscess cannot be entirely excluded  2  Fullness in the bilateral collecting systems and ureters with periureteral stranding and ureteral enhancement suspicious for ascending urinary tract infection in the setting of cystitis  3  Mild hepatic steatosis       Results from last 7 days   Lab Units 08/22/22  9926 08/21/22 2056   WBC Thousand/uL 9 82 9 36   HEMOGLOBIN g/dL 13 0 13 4   HEMATOCRIT % 38 8 40 6   PLATELETS Thousands/uL 224 227   NEUTROS ABS Thousands/µL 7 89* 7 44     Results from last 7 days   Lab Units 08/23/22 0657 08/22/22 0527 08/21/22 2056   SODIUM mmol/L 138 137 135   POTASSIUM mmol/L 4 2 3 8 3 9   CHLORIDE mmol/L 108 107 106   CO2 mmol/L 25 23 25   ANION GAP mmol/L 5 7 4   BUN mg/dL 10 12 15   CREATININE mg/dL 0 97 1 00 1 09   EGFR ml/min/1 73sq m 82 79 71   CALCIUM mg/dL 8 6 8 3 8 8   MAGNESIUM mg/dL  --  2 4  --    PHOSPHORUS mg/dL  --  3 2  --      Results from last 7 days   Lab Units 08/22/22 0527 08/21/22 2056   AST U/L 18 23   ALT U/L 31 35   ALK PHOS U/L 100 103   TOTAL PROTEIN g/dL 6 6 7 0   ALBUMIN g/dL 2 8* 3 3*   TOTAL BILIRUBIN mg/dL 0 82 0 77     Results from last 7 days   Lab Units 08/23/22  0657 08/22/22 0527 08/21/22 2056   GLUCOSE RANDOM mg/dL 95 106 105     Results from last 7 days   Lab Units 08/22/22 0527   PROCALCITONIN ng/ml 0 16     Results from last 7 days   Lab Units 08/21/22 2114   CLARITY UA  Turbid   COLOR UA  Light Yellow   SPEC GRAV UA  1 007   PH UA  6 0   GLUCOSE UA mg/dl Negative   KETONES UA mg/dl Negative   BLOOD UA  Moderate*   PROTEIN UA mg/dl 30 (1+)*   NITRITE UA  Positive*   BILIRUBIN UA  Negative   UROBILINOGEN UA (BE) mg/dl <2 0   LEUKOCYTES UA  Large*   WBC UA /hpf Innumerable*   RBC UA /hpf 2-4*   BACTERIA UA /hpf Moderate*   EPITHELIAL CELLS WET PREP /hpf Occasional     Results from last 7 days   Lab Units 08/21/22 2114 08/21/22 2101 08/21/22 2056   BLOOD CULTURE   --  No Growth at 24 hrs  No Growth at 24 hrs     URINE CULTURE  >100,000 cfu/ml Klebsiella pneumoniae*  --   --        Medications:   Scheduled Medications:  cefTRIAXone, 1,000 mg, Intravenous, Q24H  enoxaparin, 40 mg, Subcutaneous, Daily  fluticasone, 2 puff, Inhalation, Q12H CHANEL  losartan, 25 mg, Oral, Daily  montelukast, 10 mg, Oral, HS  pravastatin, 40 mg, Oral, Daily With Dinner  saccharomyces boulardii, 250 mg, Oral, BID    Continuous IV Infusions:  multi-electrolyte, 100 mL/hr, Intravenous, Continuous    PRN Meds:  acetaminophen, 650 mg, Oral, Q6H PRN 8/22 x2, 8/23 x1  aluminum-magnesium hydroxide-simethicone, 30 mL, Oral, Q6H PRN 8/22 x2  docusate sodium, 100 mg, Oral, BID PRN 8/22 x1, 8/23 x1  HYDROmorphone, 0 2 mg, Intravenous, Q4H PRN  ondansetron, 4 mg, Intravenous, Q6H PRN  oxybutynin, 5 mg, Oral, Q8H PRN 8/21 x1, 8/22 x2, 8/23 x1  oxyCODONE, 2 5 mg, Oral, Q4H PRN  oxyCODONE, 5 mg, Oral, Q4H PRN        Discharge Plan: TBD    Network Utilization Review Department  ATTENTION: Please call with any questions or concerns to [phone] and carefully listen to the prompts so that you are directed to the right person  All voicemails are confidential   Roberta Herman all requests for admission clinical reviews, approved or denied determinations and any other requests to dedicated fax number below belonging to the campus where the patient is receiving treatment   List of dedicated fax numbers for the Facilities:  [address] DENIALS (Administrative/Medical Necessity) [phone]   [address] (Maternity/NICU/Pediatrics) [phone]   [address]  [address] [address] [place] [address] [address] [facility] [phone]

## 2022-08-24 ENCOUNTER — APPOINTMENT (INPATIENT)
Dept: RADIOLOGY | Facility: HOSPITAL | Age: 62
DRG: 690 | End: 2022-08-24
Payer: COMMERCIAL

## 2022-08-24 LAB — BACTERIA UR CULT: ABNORMAL

## 2022-08-24 PROCEDURE — 72194 CT PELVIS W/O & W/DYE: CPT

## 2022-08-24 PROCEDURE — G1004 CDSM NDSC: HCPCS

## 2022-08-24 PROCEDURE — NC001 PR NO CHARGE: Performed by: PHYSICIAN ASSISTANT

## 2022-08-24 PROCEDURE — 99232 SBSQ HOSP IP/OBS MODERATE 35: CPT | Performed by: FAMILY MEDICINE

## 2022-08-24 RX ORDER — LIDOCAINE HYDROCHLORIDE 20 MG/ML
1 JELLY TOPICAL DAILY PRN
Status: DISCONTINUED | OUTPATIENT
Start: 2022-08-24 | End: 2022-08-25 | Stop reason: HOSPADM

## 2022-08-24 RX ORDER — SULFAMETHOXAZOLE AND TRIMETHOPRIM 800; 160 MG/1; MG/1
1 TABLET ORAL EVERY 12 HOURS SCHEDULED
Status: DISCONTINUED | OUTPATIENT
Start: 2022-08-25 | End: 2022-08-25 | Stop reason: HOSPADM

## 2022-08-24 RX ADMIN — IOHEXOL 10 ML: 350 INJECTION, SOLUTION INTRAVENOUS at 10:54

## 2022-08-24 RX ADMIN — ENOXAPARIN SODIUM 40 MG: 40 INJECTION SUBCUTANEOUS at 08:53

## 2022-08-24 RX ADMIN — ACETAMINOPHEN 650 MG: 325 TABLET ORAL at 21:52

## 2022-08-24 RX ADMIN — LOSARTAN POTASSIUM 25 MG: 25 TABLET, FILM COATED ORAL at 08:53

## 2022-08-24 RX ADMIN — Medication 250 MG: at 17:27

## 2022-08-24 RX ADMIN — ACETAMINOPHEN 650 MG: 325 TABLET ORAL at 12:31

## 2022-08-24 RX ADMIN — PRAVASTATIN SODIUM 40 MG: 40 TABLET ORAL at 16:37

## 2022-08-24 RX ADMIN — OXYBUTYNIN CHLORIDE 5 MG: 5 TABLET ORAL at 12:31

## 2022-08-24 RX ADMIN — Medication 250 MG: at 08:53

## 2022-08-24 RX ADMIN — CEFTRIAXONE 1000 MG: 10 INJECTION, POWDER, FOR SOLUTION INTRAVENOUS at 21:51

## 2022-08-24 NOTE — PROGRESS NOTES
Progress Note - Urology  Dru Devries 60 y o  male MRN: [id number]  Unit/Bed#: [facility] 824-01 Encounter: [id number]    Assessment & Plan:  79-year-old male status post robotic prostatectomy/bladder neck reconstruction on 08/11/2020 to readmitted fever:  -CT scan on admission revealed fluid collection with gas along the right obturator internus muscle, possibly postoperative is no enhancing walls visualized early abscess cannot be excluded, bilateral fullness in collecting systems  [place] ureteral stranding and ureteral enhancement suspicious for ascending urinary tract infection  -urine culture positive for Klebsiella pneumoniae, currently receiving ceftriaxone, patient was semen additional dose of IV antibiotics tonight with transition to oral antibiotics tomorrow a m  For discussion with  attending patient will be discharged on Bactrim ten-day course   -patient currently afebrile for over 24 hours  -no leukocytosis  -0 97  -hemoglobin 13 0  -CT cystogram performed today reveals no evidence of bladder leak at the vesicourethral anastomosis  Postoperative changes in the pelvis suspected small amount hematoma upper postoperative bed and small postoperative fluid collection along the right lower pelvis sidewall   attending evaluated CT imaging with low concern  No plan for drainage of fluid collection noted previously IR consulted earlier in admission for drain  With no plan for drainage  Postoperative changes may also represent Surgiflow    -urethral Ramachandran catheter removed today by myself  Urology will continue to follow  Anticipate discharge tomorrow a m  On oral antibiotics    Subjective/Objective   Chief Complaint:  None    Subjective:   Patient currently sitting comfortably in bed no acute distress denying any nausea, vomiting, fevers, chills, flank pain or abdominal pain  Only complaint that he has at this time is a very subtle change in his voice most likely from recent anesthesia    He will continue to monitor  No additional complaints at this time    Objective:     Blood pressure 144/88, pulse 60, temperature 98 1 °F (36 7 °C), resp  rate 18, height 5' 9" (1 753 m), weight 106 kg (233 lb 9 6 oz), SpO2 95 %  ,Body mass index is 34 5 kg/m²  Intake/Output Summary (Last 24 hours) at 8/24/2022 1450  Last data filed at 8/24/2022 1151  Gross per 24 hour   Intake --   Output 5350 ml   Net -5350 ml       Invasive Devices  Report    Peripheral Intravenous Line  Duration           Peripheral IV 08/22/22 Left;Ventral (anterior) Forearm 2 days          Drain  Duration           Urethral Catheter Non-latex 20 Fr  13 days                Physical Exam  Constitutional:       General: He is not in acute distress  Appearance: He is normal weight  He is not ill-appearing, toxic-appearing or diaphoretic  HENT:      Head: Normocephalic and atraumatic  Right Ear: External ear normal       Left Ear: External ear normal       Nose: Nose normal       Mouth/Throat:      Pharynx: Oropharynx is clear  Eyes:      General: No scleral icterus  Conjunctiva/sclera: Conjunctivae normal    Cardiovascular:      Rate and Rhythm: Normal rate and regular rhythm  Pulses: Normal pulses  Heart sounds: No murmur heard  No friction rub  No gallop  Pulmonary:      Effort: Pulmonary effort is normal  No respiratory distress  Breath sounds: No wheezing, rhonchi or rales  Abdominal:      General: Bowel sounds are normal  There is no distension  Tenderness: There is no abdominal tenderness  Comments: Surgical incisions intact, no sign of wound dehiscence or  underlying infection, patient does have some pelvic ecchymosis  Bowel sounds present throughout, abdomen nontender in all quadrants     Genitourinary:     Comments: Urethral Ramachandran catheter in place draining clear yellow urine, Ramachandran catheter removed without difficulty a trauma  Musculoskeletal:      Cervical back: Normal range of motion  Skin:     General: Skin is warm and dry  Neurological:      General: No focal deficit present  Mental Status: He is alert and oriented to person, place, and time  Psychiatric:         Mood and Affect: Mood normal          Behavior: Behavior normal          Thought Content: Thought content normal          Judgment: Judgment normal            Lab, Imaging and other studies:I have personally reviewed pertinent lab results      Lab Results   Component Value Date    WBC 9 82 08/22/2022    HGB 13 0 08/22/2022    HCT 38 8 08/22/2022    MCV 89 08/22/2022     08/22/2022     Lab Results   Component Value Date    SODIUM 138 08/23/2022    K 4 2 08/23/2022     08/23/2022    CO2 25 08/23/2022    BUN 10 08/23/2022    CREATININE 0 97 08/23/2022    GLUC 95 08/23/2022    CALCIUM 8 6 08/23/2022       VTE Pharmacologic Prophylaxis: Enoxaparin (Lovenox)  VTE Mechanical Prophylaxis: sequential compression device    Sybil Saavedra PA-C

## 2022-08-24 NOTE — PLAN OF CARE
Problem: Potential for Falls  Goal: Patient will remain free of falls  Description: INTERVENTIONS:  - Educate patient/family on patient safety including physical limitations  - Instruct patient to call for assistance with activity   - Consult OT/PT to assist with strengthening/mobility   - Keep Call bell within reach  - Keep bed low and locked with side rails adjusted as appropriate  - Keep care items and personal belongings within reach  - Initiate and maintain comfort rounds  - Make Fall Risk Sign visible to staff  - Apply yellow socks and bracelet for high fall risk patients  - Consider moving patient to room near nurses station  Outcome: Progressing     Problem: PAIN - ADULT  Goal: Verbalizes/displays adequate comfort level or baseline comfort level  Description: Interventions:  - Encourage patient to monitor pain and request assistance  - Assess pain using appropriate pain scale  - Administer analgesics based on type and severity of pain and evaluate response  - Implement non-pharmacological measures as appropriate and evaluate response  - Consider cultural and social influences on pain and pain management  - Notify physician/advanced practitioner if interventions unsuccessful or patient reports new pain  Outcome: Progressing     Problem: INFECTION - ADULT  Goal: Absence or prevention of progression during hospitalization  Description: INTERVENTIONS:  - Assess and monitor for signs and symptoms of infection  - Monitor lab/diagnostic results  - Monitor all insertion sites, i e  indwelling lines, tubes, and drains  - Monitor endotracheal if appropriate and nasal secretions for changes in amount and color  - [place] appropriate cooling/warming therapies per order  - Administer medications as ordered  - Instruct and encourage patient and family to use good hand hygiene technique  - Identify and instruct in appropriate isolation precautions for identified infection/condition  Outcome: Progressing  Goal: Absence of fever/infection during neutropenic period  Description: INTERVENTIONS:  - Monitor WBC    Outcome: Progressing

## 2022-08-24 NOTE — RESTORATIVE TECHNICIAN NOTE
Restorative Technician Note      Patient Name: Miranda Lee     Restorative Tech Visit Date: 8/24/2022  Note Type: Mobility  Patient Position Upon Consult: Supine  Activity Performed: Ambulated  Assistive Device: Other (Comment) (IV pole)  Patient Position at End of Consult: Supine;  All needs within reach    Isela Gerber  DPT, Restorative Technician

## 2022-08-24 NOTE — PROGRESS NOTES
[address] [facility]  Progress Note - Jessica Inman 1960, 60 y o  male MRN: [id number]  [facility] Encounter: [id number]  Primary Care Provider: Gómez Loza DO   Date and time admitted to hospital: 8/21/2022  7:22 PM    * Fever in adult  Assessment & Plan  · Patient presented with fever, lactate normal in hemodynamically stable  · Per discussion with Urology, likely UTI and not related to imaging findings post-prostatectomy  · Continue IV ceftriaxone  · Follow-up blood and urine cultures-will transition to p o  antibiotics once the sensitivities back    Hepatic steatosis  Assessment & Plan  · Incidental finding on CT abdomen and pelvis done 8/21  · Not clinically relevant to presentation  · Patient showed have follow-up with PCP in consideration of referral to GI outpatient if needed    H/O radical prostatectomy  Assessment & Plan  · Urology consult  Prostate cancer [facility])  Assessment & Plan  · Status post prostatectomy; awaiting further urology opinion  Gastroesophageal reflux disease without esophagitis  Assessment & Plan  · Maalox prn    Hyperlipemia  Assessment & Plan  · Continue statin    HTN (hypertension)  Assessment & Plan  · Continue ARB          VTE Pharmacologic Prophylaxis:   Moderate Risk (Score 3-4) - Pharmacological DVT Prophylaxis Ordered: enoxaparin (Lovenox)  Patient Centered Rounds: I performed bedside rounds with nursing staff today  Discussions with Specialists or Other Care Team Provider:     Education and Discussions with Family / Patient: Updated  (wife) at bedside  Time Spent for Care: 30 minutes  More than 50% of total time spent on counseling and coordination of care as described above      Current Length of Stay: 3 day(s)  Current Patient Status: Inpatient   Certification Statement: The patient will continue to require additional inpatient hospital stay due to Pending voiding trial  Discharge Plan: Anticipate discharge in 24-48 hrs to home  Code Status: Level 1 - Full Code    Subjective:   Patient seen and examined  Status post cystogram today-no leak noted  Urology to take the Ramachandran catheter out today and likely DC tomorrow if remained stable    Objective:     Vitals:   Temp (24hrs), Av 9 °F (36 6 °C), Min:97 5 °F (36 4 °C), Max:98 1 °F (36 7 °C)    Temp:  [97 5 °F (36 4 °C)-98 1 °F (36 7 °C)] 98 1 °F (36 7 °C)  HR:  [60-62] 62  Resp:  [16-18] 16  BP: (138-144)/(86-94) 142/86  SpO2:  [94 %-95 %] 94 %  Body mass index is 34 5 kg/m²  Input and Output Summary (last 24 hours): Intake/Output Summary (Last 24 hours) at 2022 1706  Last data filed at 2022 1526  Gross per 24 hour   Intake --   Output 3450 ml   Net -3450 ml       Physical Exam:   Physical Exam  Constitutional:       General: He is not in acute distress  Appearance: He is normal weight  HENT:      Head: Normocephalic  Nose: Nose normal    Eyes:      General: No scleral icterus  Cardiovascular:      Rate and Rhythm: Normal rate  Pulses: Normal pulses  Heart sounds: Normal heart sounds  Pulmonary:      Effort: Pulmonary effort is normal    Abdominal:      General: Abdomen is flat  Genitourinary:     Comments: Ramachandran catheter present  Musculoskeletal:         General: Normal range of motion  Cervical back: Normal range of motion and neck supple  Skin:     General: Skin is warm  Neurological:      General: No focal deficit present           Additional Data:     Labs:  Results from last 7 days   Lab Units 22  0527   WBC Thousand/uL 9 82   HEMOGLOBIN g/dL 13 0   HEMATOCRIT % 38 8   PLATELETS Thousands/uL 224   NEUTROS PCT % 81*   LYMPHS PCT % 9*   MONOS PCT % 9   EOS PCT % 1     Results from last 7 days   Lab Units 22  0657 22  0527   SODIUM mmol/L 138 137   POTASSIUM mmol/L 4 2 3 8   CHLORIDE mmol/L 108 107   CO2 mmol/L 25 23   BUN mg/dL 10 12   CREATININE mg/dL 0 97 1 00   ANION GAP mmol/L 5 7   CALCIUM mg/dL 8 6 8 3   ALBUMIN g/dL  --  2 8*   TOTAL BILIRUBIN mg/dL  --  0 82   ALK PHOS U/L  --  100   ALT U/L  --  31   AST U/L  --  18   GLUCOSE RANDOM mg/dL 95 106                 Results from last 7 days   Lab Units 08/22/22  0527 08/21/22 2056   LACTIC ACID mmol/L  --  0 9   PROCALCITONIN ng/ml 0 16  --        Lines/Drains:  Invasive Devices  Report    Peripheral Intravenous Line  Duration           Peripheral IV 08/22/22 Left;Ventral (anterior) Forearm 2 days          Drain  Duration           Urethral Catheter Non-latex 20 Fr  13 days              Urinary Catheter:  Goal for removal: Urology planning to remove Ramachandran catheter today               Imaging:   Recent Cultures (last 7 days):   Results from last 7 days   Lab Units 08/21/22 2114 08/21/22 2101 08/21/22 2056   BLOOD CULTURE   --  No Growth at 48 hrs  No Growth at 48 hrs     URINE CULTURE  >100,000 cfu/ml Klebsiella pneumoniae*  --   --        Last 24 Hours Medication List:   Current Facility-Administered Medications   Medication Dose Route Frequency Provider Last Rate    acetaminophen  650 mg Oral Q6H PRN Jose J Parra MD      aluminum-magnesium hydroxide-simethicone  30 mL Oral Q6H PRN Jose J Parra MD      docusate sodium  100 mg Oral BID PRN Jose J Parra MD      enoxaparin  40 mg Subcutaneous Daily Jose J Parra MD      fluticasone  2 puff Inhalation Q12H [address] Yousuf El MD      HYDROmorphone  0 2 mg Intravenous Q4H PRN Jose J Parra MD      lidocaine  1 application Urethral Daily PRN Blaise Linda PA-C      losartan  25 mg Oral Daily Jose J Parra MD      montelukast  10 mg Oral HS Jose J Parra MD      ondansetron  4 mg Intravenous Q6H PRN Jose J Parra MD      oxybutynin  5 mg Oral Q8H PRN Jose J Parra MD      oxyCODONE  2 5 mg Oral Q4H PRN Jose J Parra MD      oxyCODONE  5 mg Oral Q4H PRN Jose J Parra MD      pravastatin  40 mg Oral Daily With Shawna Sullivan MD      saccharomyces boulardii  250 mg Oral BID MD Tramaine Helms ON 8/25/2022] sulfamethoxazole-trimethoprim  1 tablet Oral Q12H [facility] Marly Figueroa MD          Today, Patient Was Seen By: Jorge Grace MD    **Please Note: This note may have been constructed using a voice recognition system  **

## 2022-08-25 ENCOUNTER — TRANSITIONAL CARE MANAGEMENT (OUTPATIENT)
Dept: FAMILY MEDICINE CLINIC | Facility: CLINIC | Age: 62
End: 2022-08-25

## 2022-08-25 VITALS
RESPIRATION RATE: 16 BRPM | HEART RATE: 64 BPM | TEMPERATURE: 98 F | WEIGHT: 233.6 LBS | HEIGHT: 69 IN | SYSTOLIC BLOOD PRESSURE: 146 MMHG | OXYGEN SATURATION: 95 % | DIASTOLIC BLOOD PRESSURE: 86 MMHG | BODY MASS INDEX: 34.6 KG/M2

## 2022-08-25 PROCEDURE — 99239 HOSP IP/OBS DSCHRG MGMT >30: CPT | Performed by: FAMILY MEDICINE

## 2022-08-25 PROCEDURE — NC001 PR NO CHARGE: Performed by: PHYSICIAN ASSISTANT

## 2022-08-25 RX ORDER — SULFAMETHOXAZOLE AND TRIMETHOPRIM 800; 160 MG/1; MG/1
1 TABLET ORAL EVERY 12 HOURS SCHEDULED
Qty: 19 TABLET | Refills: 0 | Status: SHIPPED | OUTPATIENT
Start: 2022-08-25 | End: 2022-09-04

## 2022-08-25 RX ADMIN — SULFAMETHOXAZOLE AND TRIMETHOPRIM 1 TABLET: 800; 160 TABLET ORAL at 08:31

## 2022-08-25 RX ADMIN — LOSARTAN POTASSIUM 25 MG: 25 TABLET, FILM COATED ORAL at 08:31

## 2022-08-25 RX ADMIN — Medication 250 MG: at 08:30

## 2022-08-25 RX ADMIN — ENOXAPARIN SODIUM 40 MG: 40 INJECTION SUBCUTANEOUS at 08:30

## 2022-08-25 NOTE — PLAN OF CARE
Problem: PAIN - ADULT  Goal: Verbalizes/displays adequate comfort level or baseline comfort level  Description: Interventions:  - Encourage patient to monitor pain and request assistance  - Assess pain using appropriate pain scale  - Administer analgesics based on type and severity of pain and evaluate response  - Implement non-pharmacological measures as appropriate and evaluate response  - Consider cultural and social influences on pain and pain management  - Notify physician/advanced practitioner if interventions unsuccessful or patient reports new pain  Outcome: Progressing     Problem: INFECTION - ADULT  Goal: Absence or prevention of progression during hospitalization  Description: INTERVENTIONS:  - Assess and monitor for signs and symptoms of infection  - Monitor lab/diagnostic results  - Monitor all insertion sites, i e  indwelling lines, tubes, and drains  - Monitor endotracheal if appropriate and nasal secretions for changes in amount and color  - [place] appropriate cooling/warming therapies per order  - Administer medications as ordered  - Instruct and encourage patient and family to use good hand hygiene technique  - Identify and instruct in appropriate isolation precautions for identified infection/condition  Outcome: Progressing     Problem: INFECTION - ADULT  Goal: Absence of fever/infection during neutropenic period  Description: INTERVENTIONS:  - Monitor WBC    Outcome: Progressing

## 2022-08-25 NOTE — ASSESSMENT & PLAN NOTE
· Incidental finding on CT abdomen and pelvis done 8/21  · Not clinically relevant to presentation  · Patient can  have follow-up with PCP in consideration of referral to GI outpatient if needed

## 2022-08-25 NOTE — ASSESSMENT & PLAN NOTE
· Patient presented with fever, lactate normal in hemodynamically stable  · Per discussion with Urology, likely UTI and not related to imaging findings post-prostatectomy  · Patient was on IV ceftriaxone in the hospital and will finish the course with Bactrim  · Secondary to UTI  · Urine culture is growing Klebsiella-finish the course of antibiotics with Bactrim as out patient

## 2022-08-25 NOTE — DISCHARGE SUMMARY
[facility]  Discharge- Yvette Murray 1960, 60 y o  male MRN: [id number]  Unit/Bed#: [facility] 824-01 Encounter: [id number]  Primary Care Provider: Tyler Block DO   Date and time admitted to hospital: 8/21/2022  7:22 PM    * Fever in adult  Assessment & Plan  · Patient presented with fever, lactate normal in hemodynamically stable  · Per discussion with Urology, likely UTI and not related to imaging findings post-prostatectomy  · Patient was on IV ceftriaxone in the hospital and will finish the course with Bactrim  · Secondary to UTI-patient had Ramachandran catheter after the procedure    With recent intervention unable to determine if the UTIs related to catheter  · Urine culture is growing Klebsiella-finish the course of antibiotics with Bactrim as out patient    Hepatic steatosis  Assessment & Plan  · Incidental finding on CT abdomen and pelvis done 8/21  · Not clinically relevant to presentation  · Patient can  have follow-up with PCP in consideration of referral to GI outpatient if needed    H/O radical prostatectomy  Assessment & Plan  · Urology consult appreciated  · Patient had cystogram done while in the hospital  · Status post Ramachandran catheter removal   · Follow-up with Urology as outpatient    Prostate cancer [facility])  Assessment & Plan  · Status post prostatectomy;   · Follow-up with Urology as an outpatient for further management    Gastroesophageal reflux disease without esophagitis  Assessment & Plan  · Continue with PPI    Hyperlipemia  Assessment & Plan  · Continue statin    HTN (hypertension)  Assessment & Plan  · Continue ARB      Medical Problems             Resolved Problems  Date Reviewed: 8/25/2022   None               Discharging Physician / Practitioner: Martina Montes De Oca MD  PCP: Tyler Block DO  Admission Date:   Admission Orders (From admission, onward)     Ordered        08/21/22 2039  Inpatient Admission  Once                      Discharge Date: 08/25/22    Consultations During Hospital Stay:  · Urology    Procedures Performed:     Significant Findings / Test Results:   ·   · Cystogram-no evidence of bladder leak at the vesicourethral anastomosis  Postoperative changes in the pelvis including suspected small amount of hematoma  · CT abdomen and pelvis-fluid collection with gas along the right obturator internus muscle possibly postoperative as no enhancing wall is visualized  Fullness in the bilateral collecting system and ureters with periurethral standing and ureteral enhancement suspicious for ascending in urinary tract infection in the setting of cystitis  Mild hepatic steatosis  Urine culture-Klebsiella  Incidental Findings:   ·     Test Results Pending at Discharge (will require follow up):   ·      Outpatient Tests Requested:  ·     Complications:   none    Reason for Admission:  Fever    Hospital Course:   Radha Srivastava is a 60 y o  male patient who originally presented to the hospital on 8/21/2022 due to fever  Patient recently had a radical prostatectomy done  Patient had Ramachandran catheter postprocedure  Patient was admitted to the hospital and started on broad-spectrum antibiotics  Felt that fever is likely related urinary tract infection  Urine culture is growing Klebsiella  Urology followed the patient during the hospitalization  Blood cultures remained negative  Patient antibiotics was changed to Bactrim at the time of discharge  Patient had a cystogram done while in the hospital and there was no evidence of leakage and his Ramachandran catheter was removed  Patient voided after the Ramachandran catheter removal   Cleared by Urology for discharge and he was discharged in a stable condition on 8/25/2022  For details refer to the chart      Please see above list of diagnoses and related plan for additional information  Condition at Discharge: good    Discharge Day Visit / Exam:   Subjective:  Patient seen and examined    No specific complaints eager to go home  Vitals: Blood Pressure: 146/86 (08/25/22 0711)  Pulse: 64 (08/24/22 2142)  Temperature: 98 °F (36 7 °C) (08/25/22 0711)  Temp Source: Oral (08/21/22 2117)  Respirations: 16 (08/25/22 0711)  Height: 5' 9" (175 3 cm) (08/21/22 2117)  Weight - Scale: 106 kg (233 lb 9 6 oz) (08/21/22 2117)  SpO2: 95 % (08/24/22 2142)  Exam:   Physical Exam  Constitutional:       Appearance: Normal appearance  HENT:      Head: Normocephalic  Nose: Nose normal    Cardiovascular:      Rate and Rhythm: Normal rate and regular rhythm  Pulmonary:      Effort: Pulmonary effort is normal       Breath sounds: Normal breath sounds  Abdominal:      General: There is no distension  Musculoskeletal:         General: Normal range of motion  Cervical back: Normal range of motion and neck supple  Skin:     General: Skin is warm  Neurological:      General: No focal deficit present  Mental Status: He is alert  Discussion with Family: Updated  (wife) at bedside  Discharge instructions/Information to patient and family:   See after visit summary for information provided to patient and family  Provisions for Follow-Up Care:  See after visit summary for information related to follow-up care and any pertinent home health orders  Disposition:   Home    Planned Readmission:      Discharge Statement:  I spent 45  minutes discharging the patient  This time was spent on the day of discharge  I had direct contact with the patient on the day of discharge  Greater than 50% of the total time was spent examining patient, answering all patient questions, arranging and discussing plan of care with patient as well as directly providing post-discharge instructions  Additional time then spent on discharge activities  Discharge Medications:  See after visit summary for reconciled discharge medications provided to patient and/or family        **Please Note: This note may have been constructed using a voice recognition system**

## 2022-08-25 NOTE — PROGRESS NOTES
Progress Note - Urology  Jessica Inman 60 y o  male MRN: [id number]  [facility] Encounter: [id number]    Assessment & Plan:  58-year-old male status post robotic prostatectomy/bladder neck reconstruction on 08/11/2020 to readmitted fever:  -CT scan on admission revealed fluid collection with gas along the right obturator internus muscle, possibly postoperative is no enhancing walls visualized early abscess cannot be excluded, bilateral fullness in collecting systems  Carmen ureteral stranding and ureteral enhancement suspicious for ascending urinary tract infection  -urine culture positive for Klebsiella pneumoniae, currently receiving ceftriaxone, patient was semen additional dose of IV antibiotics tonight with transition to oral antibiotics tomorrow a m  For discussion with  attending patient will be discharged on Bactrim ten-day course   -patient currently afebrile for over 24 hours  -no leukocytosis  -0 97  -hemoglobin 13 0  -CT cystogram performed today reveals no evidence of bladder leak at the vesicourethral anastomosis  Postoperative changes in the pelvis suspected small amount hematoma upper postoperative bed and small postoperative fluid collection along the right lower pelvis sidewall   attending evaluated CT imaging with low concern  No plan for drainage of fluid collection noted previously IR consulted earlier in admission for drain  With no plan for drainage  Postoperative changes may also represent Surgiflow    -Ramachandran catheter removed yesterday   -patient received last IV dose of antibiotics, and transitioned to Bactrim 10 day course  Patient has close outpatient follow-up with  attending on 08/29/2022  Patient clear from discharge from urologic standpoint  Subjective/Objective   Chief Complaint:  None    Subjective:   Patient currently lives sitting comfortably in bed in no acute distress that any fevers, chills, flank pain or abdominal pain    Reports since Ramachandran catheter removal he has been incontinent which is expected post prostatectomy  Reports some mild tenderness at the urethral meatus  Objective:     Blood pressure 146/86, pulse 64, temperature 98 °F (36 7 °C), resp  rate 16, height 5' 9" (1 753 m), weight 106 kg (233 lb 9 6 oz), SpO2 95 %  ,Body mass index is 34 5 kg/m²  Intake/Output Summary (Last 24 hours) at 8/25/2022 1330  Last data filed at 8/25/2022 0900  Gross per 24 hour   Intake 240 ml   Output 600 ml   Net -360 ml       Invasive Devices  Report    None                 Physical Exam  Constitutional:       General: He is not in acute distress  Appearance: He is normal weight  He is not ill-appearing, toxic-appearing or diaphoretic  HENT:      Head: Normocephalic and atraumatic  Right Ear: External ear normal       Left Ear: External ear normal       Nose: Nose normal       Mouth/Throat:      Pharynx: Oropharynx is clear  Eyes:      General: No scleral icterus  Conjunctiva/sclera: Conjunctivae normal    Cardiovascular:      Rate and Rhythm: Normal rate and regular rhythm  Pulses: Normal pulses  Heart sounds: No murmur heard  No friction rub  No gallop  Pulmonary:      Effort: Pulmonary effort is normal  No respiratory distress  Breath sounds: No wheezing, rhonchi or rales  Abdominal:      General: Bowel sounds are normal  There is no distension  Tenderness: There is no abdominal tenderness  Comments: Surgical incisions intact, no sign of wound dehiscence or  underlying infection, patient does have some pelvic ecchymosis  Bowel sounds present throughout, abdomen nontender in all quadrants     Genitourinary:     Comments: Patient requiring brief and incontinent  Musculoskeletal:      Cervical back: Normal range of motion  Skin:     General: Skin is warm and dry  Neurological:      General: No focal deficit present  Mental Status: He is alert and oriented to person, place, and time     Psychiatric: Mood and Affect: Mood normal          Behavior: Behavior normal          Thought Content: Thought content normal          Judgment: Judgment normal            Lab, Imaging and other studies:I have personally reviewed pertinent lab results      Lab Results   Component Value Date    WBC 9 82 08/22/2022    HGB 13 0 08/22/2022    HCT 38 8 08/22/2022    MCV 89 08/22/2022     08/22/2022     Lab Results   Component Value Date    SODIUM 138 08/23/2022    K 4 2 08/23/2022     08/23/2022    CO2 25 08/23/2022    BUN 10 08/23/2022    CREATININE 0 97 08/23/2022    GLUC 95 08/23/2022    CALCIUM 8 6 08/23/2022       VTE Pharmacologic Prophylaxis: Enoxaparin (Lovenox)  VTE Mechanical Prophylaxis: sequential compression device    Blaise Linda PA-C

## 2022-08-26 NOTE — UTILIZATION REVIEW
Notification of Discharge   This is a Notification of Discharge from our facility [address]  Please be advised that this patient has been discharge from our facility  Below you will find the admission and discharge date and time including the patients disposition  UTILIZATION REVIEW CONTACT:  Lord Johnson  Utilization   Network Utilization Review Department  Phone: [phone] carefully listen to the prompts  All voicemails are confidential   Email: [email or web address]     PHYSICIAN ADVISORY SERVICES:  FOR OUIL-ZE-GLDI REVIEW - MEDICAL NECESSITY DENIAL  Phone: [phone]  Fax: [phone]  Email: [email or web address]     PRESENTATION DATE: 8/21/2022  7:22 PM  OBERVATION ADMISSION DATE:   INPATIENT ADMISSION DATE: 8/21/22  8:39 PM   DISCHARGE DATE: 8/25/2022 12:24 PM  DISPOSITION: Home/Self Care Home/Self Care      IMPORTANT INFORMATION:  Send all requests for admission clinical reviews, approved or denied determinations and any other requests to dedicated fax number below belonging to the campus where the patient is receiving treatment   List of dedicated fax numbers:  [address] DENIALS (Administrative/Medical Necessity) [phone]   [address] (Maternity/NICU/Pediatrics) [phone]   [facility] [phone]   130 [facility] [phone]   [address] [place] [address] [phone]   2000 [facility] [address] [place] [address],[address] [phone]   [facility]  [phone]   [address] [facility] [address]  24[address] [facility] [address] [facility] [phone]

## 2022-08-27 LAB
BACTERIA BLD CULT: NORMAL
BACTERIA BLD CULT: NORMAL

## 2022-08-29 ENCOUNTER — OFFICE VISIT (OUTPATIENT)
Dept: UROLOGY | Facility: CLINIC | Age: 62
End: 2022-08-29

## 2022-08-29 VITALS
BODY MASS INDEX: 33.92 KG/M2 | HEIGHT: 69 IN | WEIGHT: 229 LBS | SYSTOLIC BLOOD PRESSURE: 118 MMHG | DIASTOLIC BLOOD PRESSURE: 72 MMHG

## 2022-08-29 DIAGNOSIS — N52.31 ERECTILE DYSFUNCTION AFTER RADICAL PROSTATECTOMY: Primary | ICD-10-CM

## 2022-08-29 DIAGNOSIS — C61 PROSTATE CANCER (HCC): ICD-10-CM

## 2022-08-29 PROCEDURE — 99024 POSTOP FOLLOW-UP VISIT: CPT | Performed by: UROLOGY

## 2022-08-29 RX ORDER — TADALAFIL 5 MG/1
5 TABLET ORAL DAILY PRN
Qty: 90 TABLET | Refills: 3 | Status: SHIPPED | OUTPATIENT
Start: 2022-08-29 | End: 2022-09-09 | Stop reason: CLARIF

## 2022-08-29 NOTE — PROGRESS NOTES
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  Now s/p prostate biopsy  Still having some hematospermia  JORGE 13    Patient ultimately agreed to proceed to the operating room on 8/11/2022  Robotic prostatectomy performed  Patient had a difficult apical dissection and anastomosis which required bladder neck reconstruction on the left side  Hospital stay was uncomplicated  Patient did note some pericatheter urethral leak with spasms  Unfortunately, he was readmitted August 21st with fevers and a Klebsiella UTI  Antibiotic therapy was administered, inpatient cystogram performed which was negative, catheter removed        Past Medical History:     Past Medical History:   Diagnosis Date    Anxiety     Cancer ([address] ) 6/15/22    Prostatic    GERD (gastroesophageal reflux disease) 2001    HL (hearing loss)     B/L    Hyperlipidemia     Hypertension     Obesity        PAST SURGICAL HISTORY:     Past Surgical History:   Procedure Laterality Date    COLONOSCOPY      CT CYSTOGRAM  8/24/2022    MI BIOPSY OF PROSTATE,NEEDLE,TRANSPERINEAL N/A 06/08/2022    Procedure: TRANSPERINEAL MRI FUSION BIOPSY PROSTATE;  Surgeon: Prescott Leyden, MD;  [facility];  Service: Urology    MI LAP,PROSTATECTOMY,RADICAL,W/NERVE [address] ROBOTIC N/A 8/11/2022    Procedure: ROBOTIC PROSTATECTOMY RADICAL , BLADDER NECK RECONSTRUCTION;  Surgeon: Whitney James MD;  Location: [facility];  Service: Urology   [place] [id number] SURGERY      VASECTOMY      WISDOM TOOTH EXTRACTION         CURRENT MEDICATIONS:     Current Outpatient Medications   Medication Sig Dispense Refill    acetaminophen (TYLENOL) 325 mg tablet Take 2 tablets (650 mg total) by mouth every 6 (six) hours as needed for mild pain 30 tablet 0    aspirin (ECOTRIN LOW STRENGTH) 81 mg EC tablet Take 81 mg by mouth daily      docusate sodium (COLACE) 100 mg capsule Take 1 capsule (100 mg total) by mouth 2 (two) times a day 30 capsule 0    irbesartan (AVAPRO) 75 mg tablet Take 1 tablet (75 mg total) by mouth daily 90 tablet 3    rosuvastatin (CRESTOR) 5 mg tablet Take 1 tablet (5 mg total) by mouth every other day 90 tablet 3    sulfamethoxazole-trimethoprim (BACTRIM DS) 800-160 mg per tablet Take 1 tablet by mouth every 12 (twelve) hours for 10 days 19 tablet 0    tadalafil (CIALIS) 5 MG tablet Take 1 tablet (5 mg total) by mouth daily as needed for erectile dysfunction 90 tablet 3    fluticasone (Flovent HFA) 110 MCG/ACT inhaler Inhale 2 puffs 2 (two) times a day Rinse mouth after use  (Patient not taking: No sig reported) 12 g 0    montelukast (SINGULAIR) 10 mg tablet Take 1 tablet (10 mg total) by mouth daily at bedtime (Patient not taking: No sig reported) 30 tablet 5     No current facility-administered medications for this visit  ALLERGIES:   No Known Allergies    SOCIAL HISTORY:     Social History     Socioeconomic History    Marital status: /Civil Union     Spouse name: None    Number of children: None    Years of education: None    Highest education level: None   Occupational History    None   Tobacco Use    Smoking status: Never Smoker    Smokeless tobacco: Never Used   Vaping Use    Vaping Use: Never used   Substance and Sexual Activity    Alcohol use:  Yes     Alcohol/week: 2 0 standard drinks     Types: 2 Standard drinks or equivalent per week     Comment: 1 x month    Drug use: Not Currently     Types: Marijuana     Comment: [employer]  Sexual activity: Yes     Partners: Female     Birth control/protection: Male Sterilization   Other Topics Concern    None   Social History Narrative    None     Social Determinants of Health     Financial Resource Strain: Not on file   Food Insecurity: No Food Insecurity    Worried About Running Out of Food in the Last Year: Never true    Paula of Food in the Last Year: Never true   Transportation Needs: No Transportation Needs    Lack of Transportation (Medical): No    Lack of Transportation (Non-Medical): No   Physical Activity: Not on file   Stress: Not on file   Social Connections: Not on file   Intimate Partner Violence: Not on file   Housing Stability: Low Risk     Unable to Pay for Housing in the Last Year: No    Number of Places Lived in the Last Year: 1    Unstable Housing in the Last Year: No       SOCIAL HISTORY:     Family History   Problem Relation Age of Onset    Sickle cell trait Mother     Hypertension Mother     Stroke Mother             Hypertension Father     Heart disease Father     Cardiomyopathy Brother     Sickle cell trait Maternal Grandmother     Sickle cell trait Maternal Grandfather     Hypertension Paternal Grandfather        REVIEW OF SYSTEMS:     Review of Systems   Constitutional: Negative  Respiratory: Negative  Cardiovascular: Negative  Gastrointestinal: Negative  Genitourinary: Positive for scrotal swelling  Musculoskeletal: Negative  Skin: Negative  Psychiatric/Behavioral: Negative  PHYSICAL EXAM:     /72   Ht 5' 9" (1 753 m)   Wt 104 kg (229 lb)   BMI 33 82 kg/m²     General:  Healthy appearing male in no acute distress  They have a normal affect  There is not appear to be any gross neurologic defects or abnormalities  HEENT:  Normocephalic, atraumatic  Neck is supple without any palpable lymphadenopathy  Cardiovascular:  Patient has normal palpable distal radial pulses    There is no significant peripheral edema  No JVD is noted  Respiratory:  Patient has unlabored respirations  There is no audible wheeze or rhonchi  Abdomen:  Abdomen is without surgical scars  Abdomen is soft and nontender  There is no tympany  Inguinal and umbilical hernia are not appreciated  Genitourinary: no penile lesions or discharge, no testicular masses or tenderness, grade 2 varicocele on the right, no hernias, small 0 5 cm nodule on the right apex of the prostate    Musculoskeletal:  Patient does not have significant CVA tenderness in the  flank with palpation or percussion  They full range of motion in all 4 extremities  Strength in all 4 extremities appears congruent  Patient is able to ambulate without assistance or difficulty  Dermatologic:  Patient has no skin abnormalities or rashes  LABS:     CBC:   Lab Results   Component Value Date    WBC 9 82 2022    HGB 13 0 2022    HCT 38 8 2022    MCV 89 2022     2022       BMP:   Lab Results   Component Value Date    CALCIUM 8 6 2022    K 4 2 2022    CO2 25 2022     2022    BUN 10 2022    CREATININE 0 97 2022         IMAGIN/13/22  MULTIPARAMETRIC MRI OF THE PROSTATE WITH AND WITHOUT CONTRAST-WITH 3-D POSTPROCESSING      INDICATION:   R97 20: Elevated prostate specific antigen (PSA)  N40 3: Nodular prostate with lower urinary tract symptoms  N13 8: Other obstructive and reflux uropathy      COMPARISON: None     PSA LEVEL: 2 6 ng/ml  2022  PRIOR BIOPSY DATE: Unknown  BIOPSY RESULTS: Unknown      TECHNIQUE: The following pulse sequences were obtained:  Small field-of-view axial T1-weighted and multiplanar T2-weighted images; DWI axial and ADC map; large field of view axial T2 weighted images; T1w in-phase and opposed-phase axials of entire pelvis   and dynamic 3D T1w axial before and during IV contrast injection    Imaging performed on 3 0T MRI      CONTRAST:  Gadobutrol (Gadavist) 9 mL of Gadobutrol injection (SINGLE-DOSE)     TECHNICAL LIMITATIONS: None      FINDINGS:     PROSTATE:     Size: 5 1 x 4 4 x 4 6 cm = 49 7 cc  Post-biopsy hemorrhage:  None  Central gland enlargement (BPH): Mild      Focal lesions -      Lesion: 1       Size: 1 2 x 0 8 x 0 9 cm, 0 49 cc  Location: Right posterior medial peripheral zone at the apex       T2-weighted images: Score 3: Heterogeneous signal or noncircumscribed, rounded, moderate hypointensity; includes others that do not qualify as 2, 4 or 5  Diffusion-weighted images: Score 4: Focal markedly hypointense on ADC and markedly hyperintense on high b-value DWI; less than 1 5 cm in greatest dimension  Dynamic post-contrast images: (-) Lesion that does not enhance early compared to the surrounding prostate or enhances diffusely so that the margins of the enhancing area do not correspond to a finding on T2-weighted images and/or DWI  PI-RADS Assessment Category: 4, High (clinically significant cancer is likely to be present)  Extra-prostatic extension (EPE): Abuts capsule without visualized EPE      SEMINAL VESICLES: Unremarkable     Note: Clinically significant cancer is defined on pathology/histology as Pamela score greater than or equal to 7, and/or volume of greater than or equal to 0 5 mL, and/or extraprostatic extension      URINARY BLADDER: Unremarkable      LYMPH NODES: No pelvic lymphadenopathy      BONES: No suspicious osseous lesion      There are bilateral fat-containing inguinal hernias  There is diverticulosis coli      IMPRESSION:     1  PI-RADSv2 1 Category 4 -High (clinically significant cancer is likely to be present)  Right posterior medial peripheral zone at the apex      2  No extraprostatic tumor, seminal vesicle invasion, pelvic lymphadenopathy, or pelvic osseous metastatic disease      3  Calculated prostate volume of 49 7 cc  PATHOLOGY:     6/8/22  Final Diagnosis   A   Prostate, RIGHT ANTERIOR MEDIAL needle biopsy:    -Benign prostatic tissue          B  Prostate, RIGHT BASE, needle Biopsy:   - Prostatic adenocarcinoma, confirmed by triple stain   -Portland grade 3 +4 = score  7  -Percentage of Grade 4:  10%  -Tumor involves two submitted cores  -Tumor  discontinuously involves approximately 10% , 80% of  each core biopsy  - Perineural invasion: Not identified        C  Prostate, RIGHT POSTERIOR MEDIAL, needle biopsy:    -Benign prostatic tissue          D  Prostate, LEFT BASE, needle biopsy:    -Prostate tissue with small focus of atypical glands          E  Prostate, LEFT POSTERIOR MEDIAL, needle biopsy:    -Benign prostatic tissue  Note  Triple stain shows positive staining of myoepithelial cell layer by , P63  Racemase stain is negative  Controls reacted appropriately      F  Prostate, LEFT POSTERIOR LATERAL, needle biopsy:    -Prostate tissue with small focus of atypical glands  Note  Triple stain shows focal absent staining of myoepithelial cell layer by , P63  Racemase stain is negative  Controls reacted appropriately      G  Prostate, LEFT ANTERIOR LATERAL, needle biopsy:    -Benign prostatic tissue          H  Prostate, LEFT ANTERIOR MEDIAL,  needle biopsy:    -Benign prostatic tissue          I  Prostate, RIGHT POSTERIOR LATERAL, needle Biopsy:   - Prostatic adenocarcinoma, confirmed by triple stain   -Portland grade 3 + 3 = score  6  -Tumor involves one of two submitted cores  -Tumor involves approximately 10% of  core biopsy  - Perineural invasion: Not identified        J  Prostate, RIGHT ANTERIOR LATERAL,  needle Biopsy:   - Prostatic adenocarcinoma, confirmed by triple stain   -Portland grade 3 +4 = score  7  -Percentage of Grade 4:  10%  -Tumor involves two submitted cores  -Tumor involves approximately 20% , 20% of  each core biopsy  - Perineural invasion: Not identified        K   Prostate, Region of Interest,  needle Biopsy:   - Prostatic adenocarcinoma, confirmed by triple stain -Upham grade 3 +3 = score  6  -Tumor involves four of five submitted cores  -Tumor involves approximately 35% , 50%, 60%, 75%  of  each core biopsy  - Perineural invasion: Not identified     NOMOGRAM:       ASSESSMENT:     8/11/22  Final Diagnosis   A  Soft Tissue, cely-prostatic fat:  - Benign fibroadipose tissue   - Negative for carcinoma      B  Prostate, prostate and seminal vesicles:  - Prostatic adenocarcinoma, Upham score 7 (3+4), grade group 2   - Lymphovascular invasion is not identified  - Extraprostatic extension is not identified  - Inked margins, negative for carcinoma  - Seminal vesicles, negative for carcinoma  - Please see note and synoptic report  ASSESSMENT:     60 y o  male with [id number] Upham 3+4=7 (negative SM) s/p RALP 8/11/22    PLAN:     Reviewed pathology with patient  This is an excellent pathologic result  I am hopeful for surgical cure  PSA in 2 months  Patient is leaking and is frustrated with his early leakage  He understands and expected this based on our preoperative discussion  He is working with Kegel exercises in can return to pelvic floor physical therapy at 6 weeks  Discussed penile rehabilitation for sexual function  Started Cialis 5 mg daily  Information provided for vacuum erection device  Double Island Pedicle Flap Text: The defect edges were debeveled with a #15 scalpel blade.  Given the location of the defect, shape of the defect and the proximity to free margins a double island pedicle advancement flap was deemed most appropriate.  Using a sterile surgical marker, an appropriate advancement flap was drawn incorporating the defect, outlining the appropriate donor tissue and placing the expected incisions within the relaxed skin tension lines where possible.    The area thus outlined was incised deep to adipose tissue with a #15 scalpel blade.  The skin margins were undermined to an appropriate distance in all directions around the primary defect and laterally outward around the island pedicle utilizing iris scissors.  There was minimal undermining beneath the pedicle flap.

## 2022-09-07 ENCOUNTER — OFFICE VISIT (OUTPATIENT)
Dept: FAMILY MEDICINE CLINIC | Facility: CLINIC | Age: 62
End: 2022-09-07
Payer: COMMERCIAL

## 2022-09-07 VITALS
DIASTOLIC BLOOD PRESSURE: 74 MMHG | HEIGHT: 69 IN | TEMPERATURE: 96.9 F | OXYGEN SATURATION: 98 % | SYSTOLIC BLOOD PRESSURE: 122 MMHG | HEART RATE: 97 BPM | BODY MASS INDEX: 34 KG/M2 | WEIGHT: 229.6 LBS

## 2022-09-07 DIAGNOSIS — Z12.12 SCREENING FOR COLORECTAL CANCER: ICD-10-CM

## 2022-09-07 DIAGNOSIS — Z90.79 H/O RADICAL PROSTATECTOMY: Primary | ICD-10-CM

## 2022-09-07 DIAGNOSIS — Z11.4 SCREENING FOR HIV (HUMAN IMMUNODEFICIENCY VIRUS): ICD-10-CM

## 2022-09-07 DIAGNOSIS — Z12.11 SCREENING FOR COLORECTAL CANCER: ICD-10-CM

## 2022-09-07 DIAGNOSIS — N39.0 URINARY TRACT INFECTION WITHOUT HEMATURIA, SITE UNSPECIFIED: ICD-10-CM

## 2022-09-07 DIAGNOSIS — Z11.59 NEED FOR HEPATITIS C SCREENING TEST: ICD-10-CM

## 2022-09-07 PROCEDURE — 99495 TRANSJ CARE MGMT MOD F2F 14D: CPT | Performed by: FAMILY MEDICINE

## 2022-09-07 PROCEDURE — 1111F DSCHRG MED/CURRENT MED MERGE: CPT | Performed by: FAMILY MEDICINE

## 2022-09-07 NOTE — PROGRESS NOTES
Assessment/Plan:  Patient seems to be doing well postoperatively  Recommend follow-up with Urology regarding postoperative care  He will monitor for any UTI symptoms including fever  He continues on aspirin and Cialis  He also continues on Crestor  1  H/O radical prostatectomy    2  Need for hepatitis C screening test  -     Hepatitis C Antibody ([facility]); Future    3  Screening for HIV (human immunodeficiency virus)  -     HIV 1/2 Antigen/Antibody (4th Generation) w Reflex [facility]; Future    4  Screening for colorectal cancer  -     Ambulatory referral for colonoscopy; Future    5  Urinary tract infection without hematuria, site unspecified  -     Urine culture; Future  -     Basic metabolic panel; Future          Subjective:      Patient ID: Eduardo Rainey is a 60 y o  male  Patient is here for postoperative visit after prostatectomy  Denies any fevers  He continues with erectile dysfunction and some urinary incontinence and is continuing to do Kegel exercises  He has fever free and appetite is good  He has not yet returned to work  He is due to follow up with Urology in the coming weeks  TCM Call     Date and time call was made  8/25/2022  1:45 PM    Patient was hospitialized at  [facility]    Date of Admission  08/21/22    Date of discharge  08/25/22    Diagnosis  Fever in adult    Disposition  Home    Current Symptoms  Fever      TCM Call     Should patient be enrolled in anticoag monitoring? No    Scheduled for follow up? Yes    Did you obtain your prescribed medications  Yes    Do you need help managing your prescriptions or medications  No    Is transportation to your appointment needed  No    I have advised the patient to call PCP with any new or worsening symptoms  Ilya Barton MA    Living Arrangements  Friends;  Family members; Spouse or Significiant other    Support System  Family; Friends    The type of support provided  Emotional; Physical    Do you have social support  Yes, as much as I need    Are you recieving any outpatient services  Yes    Are you recieving home care services  No    Are you using any community resources  No          TCM Call     Date and time call was made  8/25/2022  1:45 PM    Patient was hospitialized at  [facility]    Date of Admission  08/21/22    Date of discharge  08/25/22    Diagnosis  Fever in adult    Disposition  Home    Current Symptoms  Fever      TCM Call     Should patient be enrolled in anticoag monitoring? No    Scheduled for follow up? Yes    Did you obtain your prescribed medications  Yes    Do you need help managing your prescriptions or medications  No    Is transportation to your appointment needed  No    I have advised the patient to call PCP with any new or worsening symptoms  Belem Wright MA    Living Arrangements  Friends; Family members; Spouse or Significiant other    Support System  Family; Friends    The type of support provided  Emotional; Physical    Do you have social support  Yes, as much as I need    Are you recieving any outpatient services  Yes    Are you recieving home care services  No    Are you using any community resources  No          The following portions of the patient's history were reviewed and updated as appropriate: allergies, current medications, past family history, past medical history, past social history, past surgical history, and problem list     Review of Systems   Constitutional: Negative  HENT: Negative  Eyes: Negative  Respiratory: Negative  Cardiovascular: Negative  Gastrointestinal: Negative  Endocrine: Negative  Genitourinary:        As noted in HPI   Musculoskeletal: Negative  Skin: Negative  Allergic/Immunologic: Negative  Neurological: Negative  Hematological: Negative  Psychiatric/Behavioral: Negative            Objective:      /74 (BP Location: Left arm, Patient Position: Sitting, Cuff Size: Standard)   Pulse 97   Temp [facility] ) 96 9 °F (36 1 °C) (Temporal)   Ht 5' 9" (1 753 m)   Wt 104 kg (229 lb 9 6 oz)   SpO2 98%   BMI 33 91 kg/m²          Physical Exam  Vitals reviewed  Constitutional:       Appearance: He is well-developed  HENT:      Head: Normocephalic and atraumatic  Right Ear: External ear normal  Tympanic membrane is not erythematous or bulging  Left Ear: External ear normal  Tympanic membrane is not erythematous or bulging  Nose: Nose normal       Mouth/Throat:      Mouth: No oral lesions  Pharynx: No oropharyngeal exudate  Eyes:      General: No scleral icterus  Right eye: No discharge  Left eye: No discharge  Conjunctiva/sclera: Conjunctivae normal    Neck:      Thyroid: No thyromegaly  Cardiovascular:      Rate and Rhythm: Normal rate and regular rhythm  Heart sounds: Normal heart sounds  No murmur heard  No friction rub  No gallop  Pulmonary:      Effort: Pulmonary effort is normal  No respiratory distress  Breath sounds: No wheezing or rales  Chest:      Chest wall: No tenderness  Abdominal:      General: Bowel sounds are normal  There is no distension  Palpations: Abdomen is soft  There is no mass  Tenderness: There is no abdominal tenderness  There is no guarding or rebound  Musculoskeletal:         General: No tenderness or deformity  Normal range of motion  Cervical back: Normal range of motion and neck supple  Lymphadenopathy:      Cervical: No cervical adenopathy  Skin:     General: Skin is warm and dry  Coloration: Skin is not pale  Findings: No erythema or rash  Neurological:      Mental Status: He is alert and oriented to person, place, and time  Cranial Nerves: No cranial nerve deficit  Motor: No abnormal muscle tone  Coordination: Coordination normal       Deep Tendon Reflexes: Reflexes are normal and symmetric     Psychiatric:         Behavior: Behavior normal

## 2022-09-08 ENCOUNTER — APPOINTMENT (OUTPATIENT)
Dept: LAB | Facility: CLINIC | Age: 62
End: 2022-09-08
Payer: COMMERCIAL

## 2022-09-08 DIAGNOSIS — Z11.4 SCREENING FOR HIV (HUMAN IMMUNODEFICIENCY VIRUS): ICD-10-CM

## 2022-09-08 DIAGNOSIS — Z11.59 NEED FOR HEPATITIS C SCREENING TEST: ICD-10-CM

## 2022-09-08 DIAGNOSIS — N39.0 URINARY TRACT INFECTION WITHOUT HEMATURIA, SITE UNSPECIFIED: ICD-10-CM

## 2022-09-08 LAB
ANION GAP SERPL CALCULATED.3IONS-SCNC: 5 MMOL/L (ref 4–13)
BUN SERPL-MCNC: 22 MG/DL (ref 5–25)
CALCIUM SERPL-MCNC: 9.1 MG/DL (ref 8.3–10.1)
CHLORIDE SERPL-SCNC: 108 MMOL/L (ref 96–108)
CO2 SERPL-SCNC: 23 MMOL/L (ref 21–32)
CREAT SERPL-MCNC: 0.96 MG/DL (ref 0.6–1.3)
GFR SERPL CREATININE-BSD FRML MDRD: 83 ML/MIN/1.73SQ M
GLUCOSE P FAST SERPL-MCNC: 109 MG/DL (ref 65–99)
HCV AB SER QL: NORMAL
POTASSIUM SERPL-SCNC: 4.2 MMOL/L (ref 3.5–5.3)
SODIUM SERPL-SCNC: 136 MMOL/L (ref 135–147)

## 2022-09-08 PROCEDURE — 87389 HIV-1 AG W/HIV-1&-2 AB AG IA: CPT

## 2022-09-08 PROCEDURE — 87086 URINE CULTURE/COLONY COUNT: CPT

## 2022-09-08 PROCEDURE — 80048 BASIC METABOLIC PNL TOTAL CA: CPT

## 2022-09-08 PROCEDURE — 86803 HEPATITIS C AB TEST: CPT

## 2022-09-08 PROCEDURE — 36415 COLL VENOUS BLD VENIPUNCTURE: CPT

## 2022-09-09 DIAGNOSIS — N52.31 ERECTILE DYSFUNCTION AFTER RADICAL PROSTATECTOMY: ICD-10-CM

## 2022-09-09 LAB — BACTERIA UR CULT: NORMAL

## 2022-09-09 RX ORDER — TADALAFIL 5 MG/1
5 TABLET ORAL DAILY
Qty: 90 TABLET | Refills: 3 | Status: SHIPPED | OUTPATIENT
Start: 2022-09-09

## 2022-09-09 NOTE — TELEPHONE ENCOUNTER
Patient's wife called the insurance regarding the Rx tadalafil (CIALIS) 5 MG tablet is not being covered  She explain that he is taking it due to prior surgery and cancer  They advise her that the provider needs to be submit the information for review to get approved and to sent it in as urgent  She would want the Rx sent to be resent to [facility] script instead of local pharmacy  She would like the process started on getting the Rx authorized  Please give patient's wife a call back when it has been submitted

## 2022-09-09 NOTE — TELEPHONE ENCOUNTER
Paperwork for Vacuum Therapy Device order form completed and fax to [phone]  Office notes, face sheet and insurance cards fax along with order form  Confirmation received and scanned into patient's chart

## 2022-09-09 NOTE — TELEPHONE ENCOUNTER
Ms David Denisha returned my call  She was agreeable to the cost-savings afforded by the Yary Thayer   Script discontinued at [facility] and reque[facility]      Script for the requested medication was queued and forwarded to the Advanced Practitioner covering the [facility] location for approval

## 2022-09-10 LAB — HIV 1+2 AB+HIV1 P24 AG SERPL QL IA: NORMAL

## 2022-09-19 ENCOUNTER — APPOINTMENT (OUTPATIENT)
Dept: LAB | Facility: MEDICAL CENTER | Age: 62
End: 2022-09-19
Payer: COMMERCIAL

## 2022-09-19 DIAGNOSIS — R39.9 UTI SYMPTOMS: ICD-10-CM

## 2022-09-19 LAB
AMORPH URATE CRY URNS QL MICRO: ABNORMAL
BACTERIA UR QL AUTO: ABNORMAL /HPF
BILIRUB UR QL STRIP: NEGATIVE
CLARITY UR: ABNORMAL
COLOR UR: YELLOW
GLUCOSE UR STRIP-MCNC: NEGATIVE MG/DL
HGB UR QL STRIP.AUTO: ABNORMAL
KETONES UR STRIP-MCNC: NEGATIVE MG/DL
LEUKOCYTE ESTERASE UR QL STRIP: ABNORMAL
MUCOUS THREADS UR QL AUTO: ABNORMAL
NITRITE UR QL STRIP: NEGATIVE
NON-SQ EPI CELLS URNS QL MICRO: ABNORMAL /HPF
PH UR STRIP.AUTO: 6 [PH]
PROT UR STRIP-MCNC: ABNORMAL MG/DL
RBC #/AREA URNS AUTO: ABNORMAL /HPF
SP GR UR STRIP.AUTO: 1.04 (ref 1–1.03)
UROBILINOGEN UR STRIP-ACNC: <2 MG/DL
WBC #/AREA URNS AUTO: ABNORMAL /HPF

## 2022-09-19 PROCEDURE — 87086 URINE CULTURE/COLONY COUNT: CPT

## 2022-09-19 PROCEDURE — 87186 SC STD MICRODIL/AGAR DIL: CPT

## 2022-09-19 PROCEDURE — 81001 URINALYSIS AUTO W/SCOPE: CPT

## 2022-09-19 PROCEDURE — 87077 CULTURE AEROBIC IDENTIFY: CPT

## 2022-09-19 NOTE — TELEPHONE ENCOUNTER
Patient's wife calling for recommendations  She is concerned because the patient is running a temperature of 100 and shaking  She also stated he is having pelvic pain and had a previous infection 2 weeks after his prostatectomy and was in the hospital for a week  Transferred to Triage nurse Jonathan Lujan

## 2022-09-19 NOTE — TELEPHONE ENCOUNTER
Spoke with wife, reports patient has low grade temp of 100, pelvic pressure and not feeling good  States also has chills  No issues urinating  Wife and patient very concerned due to previous infection  Patient states feels like when he was sick before  Orders placed for UA/UC  Advised increase hydration  ED precautions reviewed  Advised will forward message to provider to see if any further recommendation      Pharmacy confirmed as [facility] in [place]

## 2022-09-20 ENCOUNTER — TELEPHONE (OUTPATIENT)
Dept: UROLOGY | Facility: CLINIC | Age: 62
End: 2022-09-20

## 2022-09-20 DIAGNOSIS — C61 PROSTATE CANCER (HCC): Primary | ICD-10-CM

## 2022-09-20 DIAGNOSIS — N39.0 URINARY TRACT INFECTION WITHOUT HEMATURIA, SITE UNSPECIFIED: ICD-10-CM

## 2022-09-20 RX ORDER — SULFAMETHOXAZOLE AND TRIMETHOPRIM 800; 160 MG/1; MG/1
1 TABLET ORAL EVERY 12 HOURS SCHEDULED
Qty: 20 TABLET | Refills: 0 | Status: SHIPPED | OUTPATIENT
Start: 2022-09-20 | End: 2022-09-30

## 2022-09-20 NOTE — TELEPHONE ENCOUNTER
Called the patient and made [place] aware Bactrim sent to his pharmacy    Instructed patient to please call the office if he is not responding to the Bactrim

## 2022-09-20 NOTE — TELEPHONE ENCOUNTER
----- Message from [facility] Kamron Landeros sent at 9/19/2022  9:54 PM EDT -----  Regarding: High fever   Dr Kimberli Huang   This afternoon I had a fever  Called your office and had a Urinalysis done  Just got results  My fever is 102 7, chills, no appetite, and lower abdominal discomfort  Please how to proceed      Ilan Long

## 2022-09-20 NOTE — TELEPHONE ENCOUNTER
Wife reports patient has fevers up to 103, chills, abd discomfort, no appetite  He has surgery August 11th

## 2022-09-20 NOTE — TELEPHONE ENCOUNTER
Called patient, states that overnight fever was up to 103 and had chills  Reports no appetitie  Victor Manuel Inman is taking tynelol and advil  This morning temp is 97 5  States did complete his UA yesterday  Pt is incontintent since surgery, wife concerned having a hard time adjusting to this  Did advise will forward message to provider to see if they would like to start something since having symptoms for UTI  Also advised will find out about support group or what other help we can offer for him

## 2022-09-20 NOTE — TELEPHONE ENCOUNTER
Called the patient who reports starting yesterday he developed a temperature of 102 7 with shaking chills  No appetite  Lower abdominal discomfort  Patient continues with urinary incontinence but denies dysuria or hematuria  Taking Tylenol every 2 hours with Advil every 2 hours  Urine culture in process  Urinalysis finalized    Will send encounter to Dr Aaliyah Rodriguez then call patient back

## 2022-09-21 LAB — BACTERIA UR CULT: ABNORMAL

## 2022-09-22 ENCOUNTER — TELEPHONE (OUTPATIENT)
Dept: UROLOGY | Facility: CLINIC | Age: 62
End: 2022-09-22

## 2022-09-22 ENCOUNTER — EVALUATION (OUTPATIENT)
Dept: PHYSICAL THERAPY | Facility: REHABILITATION | Age: 62
End: 2022-09-22
Payer: COMMERCIAL

## 2022-09-22 DIAGNOSIS — N39.45 CONTINUOUS LEAKAGE OF URINE: Primary | ICD-10-CM

## 2022-09-22 DIAGNOSIS — M62.89 PELVIC FLOOR WEAKNESS, MALE: ICD-10-CM

## 2022-09-22 DIAGNOSIS — R27.9 UNSPECIFIED LACK OF COORDINATION: ICD-10-CM

## 2022-09-22 PROCEDURE — 97163 PT EVAL HIGH COMPLEX 45 MIN: CPT

## 2022-09-22 PROCEDURE — 97530 THERAPEUTIC ACTIVITIES: CPT

## 2022-09-22 NOTE — PROGRESS NOTES
PT Evaluation     Today's date: 2022  Patient name: Miranda Lee  : 1960  MRN: [id number]  Referring provider: Radha Becerril, *  Dx:   Encounter Diagnosis     ICD-10-CM    1  Continuous leakage of urine  N39 45    2  Pelvic floor weakness, male  M62 89    3  Unspecified lack of coordination  R27 9        Start Time: 1355  Stop Time: 1505  Total time in clinic (min): 70 minutes    Assessment  Assessment details: Earl Morocho is a 60y o  year old male with complaints of urinary incontinence and perineal pain  The following impairments were found on evaluation: decreased strength of PFMs, decreased coordination of PFMs, poor bladder habits, poor fluid intake, increased muscle tone/tenderness to PFMs, decreased scar mobility  Patient's presentation is consistent with pelvic floor dysfunction  These impairments contribute to the following functional limitations: decreased tolerance to functional activity and ability to complete tasks for work; and the following disability: decreased quality of life and increased risk of infection  Earl Morocho  is a good candidate for therapy and would benefit from skilled physical therapy to address the above impairments in order to allow the patient to achieve below goals and return to his prior level of function  Patient education provided on PFM anatomy and function, timed voiding, scar massage, fluid intake  Patient educated on diagnosis, plan of care and prognosis  Sandra Lopez are in agreement with recommended plan of care, goals for therapy and demonstrates motivation for active participation in proposed plan of care      Thank you Dr Gerardo Soto for this referral!    Impairments: abnormal coordination, abnormal or restricted ROM, impaired physical strength, lacks appropriate home exercise program, poor posture  and poor body mechanics  Barriers to therapy: none  Understanding of Dx/Px/POC: good   Prognosis: good    Goals  STG to be completed in 8 weeks from 9/22/2022:  1  Anthony Arroyo will be independent with initial home exercise program    2  Anthony Arroyo will demonstrate ability to contract, relax and actively lengthen PFM  3  Anthony Arroyo will report 50% improvement in UI    4  Anthony Arroyo will report pain no greater than 2/10 with sitting with good posture  5  Anthony Arroyo will report wearing 5 or less pads in a day  10  Anthony Arroyo will report voiding every 2-4 hours  LTG to be completed in 12 weeks from 9/22/2022 or upon discharge from PFPT:  1  Easton will be independent with advanced home exercise program for self-management of symptoms  2  Anthony Arroyo will demonstrate ability to appropriately activate deep core muscles with functional mobility tasks  3  Easton report 90% improvement or better in UI    4  Anthony Arroyo will be able to wear no pad or liner to decrease risk of urinary tract infection  5  Anthony Arroyo will report pain no greater than 0/10 with sitting for 60 min or more           Plan  Patient would benefit from: skilled physical therapy  Planned modality interventions: biofeedback, cryotherapy, thermotherapy: hydrocollator packs and ultrasound  Planned therapy interventions: abdominal trunk stabilization, activity modification, ADL retraining, balance, balance/weight bearing training, behavior modification, body mechanics training, breathing training, coordination, flexibility, functional ROM exercises, gait training, graded activity, graded exercise, graded motor, home exercise program, joint mobilization, manual therapy, massage, motor coordination training, neuromuscular re-education, patient education, postural training, strengthening, stretching, therapeutic activities and therapeutic exercise  Frequency: 1x week  Duration in weeks: 12  Plan of Care beginning date: 9/22/2022  Plan of Care expiration date: 12/15/2022  Treatment plan discussed with: patient, PTA and referring physician        PT Pelvic Floor Subjective:   History of Present Illness:   Benyn Avalos is a 60 y o  male who presents to OPPT with primary complaint of post-op prostatectomy  They are referred to OPPT by Dr Kaitlyn De La Cruz reports he had radical prostatectomy 8/11/2022 with Jaqueline Martino with bladder neck reconstruction  He was supposed to be d/c the following AM and was kept and extra day due to leaking urine around the malave cathter  Came home 11/13/2022 and was readmitted the next Sunday Friday he lot appetite and Sunday sat down and was shaking and had 103 fever  Was admitted to hospital for 5 days  Last day in the hospital was the 25th  Pulled the catheter 8/24  He was grossly incontinent of urine  He immediately started doing kegals  Was unable to do the sit to stand and difficulty with pelvic contractions due to perineal pain  He has been doing kegals 3-4 times a day and found a video online that was slightly different but he has stopped doing those  About a week and a half ago he noticed he gets a feeling of bladder feeling full when he is lying flat  But as soon as he rolls out of bed and gets to toilet and pulls down brief and then has to pinch off and then will have a urine stream   2 times in the last 10 days he woke up in the AM and felt he somewhat had an erection but is not sure because he also had a wet diaper  He no longer has a lifting restriction  He had another UTI start on this past Monday with fever and had urinalysis  Currently on antibiotics  States his bowel function has returned to normal pretty quickly  Will wipe and it is clean but feels like there is still something in there  When he doesn't take mirilax type 2-3  When he takes mirilax type 5  He stopped taking mirilax 2 days ago, and did an enema a few hours ago  No pain with BMs  Since surgery he has been feeling like he has some vocal cord irritation/ hard time getting words out/ a catch in his breath and he runs out of air  Will be talking to ENT about this       Questions:     Social Support:     Lives in: Multiple-level home (no issues on stairs )    Lives with:  Spouse    Relationship status: /committed    Work status: employed full time (not working right now- hoping to return oct 2)    Life stress severity: severe (wife is worried about his emotions )  Diet and Exercise:    Diet:balanced nutrition    Exercise type: walking and no activity  Co-morbidities:    Patient Active Problem List:     Abnormal MRI     Abnormality detected on rectal examination of prostate     HTN (hypertension)     Hyperlipemia     MRI of brain abnormal     Myelopathy (HCC)     Gastroesophageal reflux disease without esophagitis     Family history of early CAD     Elevated BP without diagnosis of hypertension     Benign essential tremor     Prostate cancer (HCC)     SVT (supraventricular tachycardia) (HCC)     Tremor     Vitamin D deficiency     Fever in adult     H/O radical prostatectomy     Hepatic steatosis      Bladder Function:     Voiding Difficulties comments:     Urinary leakage: urine leakage (constant)    Urinary leakage aggravated by: standing up (will have a gush ) and walking to the bathroom (getting OOB and going to the bathroom )    Nocturia (episodes per night): 2 (wet diaper is waking him- then will feel the urge )    Painful urination: Yes (mild urethral (especially with manually pinching penis to get over toilet))      Intake (ounces):      Intake (ounces) comment: Cranberry juice 8-16oz   Water 2-3 16 oz bottles a day   Bottle of coke rarely   Incontinence Management:     Pads/Diaper Use:  24 hours    Pads/Diapers Additional Comments: pull up with depends pads for men (pull up will last all day typically, going through 12 pads in a day)  Bowel Function:     Voiding DIfficulties: unfinished feeling after defecating and constipation      Bowel frequency: daily    [place] Stool Scale: type 2, type 3 and type 5    Stool softener use: stool softeners (mirilax )  Sexual Function:     Sexually Active:  Not sexually active  Pain:     Current pain ratin    At best pain ratin    At worst pain ratin    Location:  Perineal pain     Quality:  Pressure (sore)    Exacerbated by: only sitting     Duration of symptoms:  Does not go away    Relieving factors:  Change in position  Treatments:     Previous treatment:  Physical therapy  Patient Goals:     Patient goals for therapy:  Fully empty bladder or bowels, improved bladder or bowel function and improved quality of life    Other patient goals:  "to be continent"       Objective   Pelvic Floor Exam   Position: side-lying    Skin inspection:   Number of scars: 4  Additional skin inspection details: LLQ (drain site) moderate restriction   Scar 1 location: caudal to umbillicus   Sensitivity level low Restriction level medium   Scar 2 location: RUQ lateral  Sensitivity level low Restriction level medium   Scar 3 location: RUQ medial  Sensitivity level low Restriction level low   Scar 4 location: RLQ medial  Sensitivity level low Restriction level low     General Perineum Exam:   perineum intact  Negative for swelling, rectal irritation, gaping introitus, hemorrhoids, no pelvic organ prolapse at rest and perianal erythema    Visual Inspection of Perineum:   Excursion of perineal body in cephalad direction with contraction of pelvic floor muscles (PFM): weak  Excursion of perineal body in caudal direction with relaxation of pelvic floor muscles (PFM): weak  Cough reflex: cough reflex (weak)  Sphincter Tone Squeeze: normal  Tenderness: provoked    Pelvic Organ Prolapse no pelvic organ prolapse at rest    Pelvic Floor Muscle Exam       PERFECT Score   Power right: 3/5  Power left: 3/5  Endurance (seconds to max): 8  Repetitions (before fatigue): 4      TTP to PFMs layer 3- reproduces pt pain          Precautions: standard, post-op     Eastons To-Do:  ·       Go to the bathroom and try to urinate every 2 hours during the day  o  No pushing!  Take a deep breath and relax   · Kegels: 5 times a day (sitting)  o  10 second hold   o  5 times  o  10 quick flicks   o  NO BREATH HOLDING! ·       Increase fluids!  Gradually more water around 5-6 bottles a day   ·       Diaphragmatic Breathing- belly breath in through the nose, out through the mouth like you are blowing out candles (3 min)  ·       Perineal massage with golf ball - 1-2 times a day no more than 5-10 min   ·       Scar massage to abdominal scars        9/22/2022           Patient Ed           PFM anatomy and function [address] [facility]          Bladder health and habits  [address] [facility]          Bladder retraining  [address] [facility]          Scar massage  [address] [facility]          Water intake [address] [facility]                                Neuro Re-Ed           DB with PFM           STS with PFM           BF           Bridge with PFM           Pelvic elevator                                  Ther Ex           Warm-up                                                                                         Ther Activity                                 Manual           MFR PFM                                                       Modalities

## 2022-09-22 NOTE — TELEPHONE ENCOUNTER
----- Message from Mandie Cueva MD sent at 9/22/2022  9:21 AM EDT -----  Patient's urine culture again shows lower colony count Klebsiella    If he is still having issues on antibiotics or after completion, please have him contact the office and I will arrange additional evaluation

## 2022-09-22 NOTE — TELEPHONE ENCOUNTER
Called the patient who states he is starting to feel better  No fever x 24 hours  Continues with urinary incontinence  Has appointment with PFPT

## 2022-09-23 ENCOUNTER — NURSE TRIAGE (OUTPATIENT)
Dept: OTHER | Facility: OTHER | Age: 62
End: 2022-09-23

## 2022-09-23 NOTE — TELEPHONE ENCOUNTER
pt is currently on Bactrim for Klebsiella UTI started on 9/20- 10 day course  Pt reports 3 large amounts for brown mucous discharge  Denies fever an pain  pt was hospitalized 8/21 - 8/25 for Klebsiella UTI and received Bactrim as well  On call provider paged  Per Provider patient to finish current ABX course,  increase PO intake to 1 5L, take cranberry juice and vitamin C  If  Patient develops pain / fever to call and schedule an appt to F/U  Patient aware  Reason for Disposition   [1] Taking antibiotic > 72 hours (3 days) for UTI or STI AND [2] penile discharge (yellow or white pus) not improved    Answer Assessment - Initial Assessment Questions  1  ANTIBIOTIC: "What antibiotic are you taking?" "How many times per day?"      bactrim  2  DURATION: "When was the antibiotic started?"      9/20/22 - 9/30/22  3  MAIN SYMPTOM: "What is the main symptom you are concerned about?"      Brown mucous discharge  4  FEVER: "Do you have a fever?" If Yes, ask: "What is it, how was it measured, and when did it start?"      denies  5   OTHER SYMPTOMS: "Do you have any other symptoms?" (e g , flank pain, penile discharge, scrotal pain, blood in urine)     Denies flank and penis pain, Pain with kegel exercises,    Protocols used: URINARY TRACT INFECTION ON ANTIBIOTIC FOLLOW[facility]UP CALL [facility]

## 2022-09-23 NOTE — TELEPHONE ENCOUNTER
Regarding: Discharge on Urine  ----- Message from Yuridia Dunbar sent at 9/23/2022  5:22 PM EDT -----  "I was being treated for a UTI and bladder infection   I am now having a brown like color and mucus like discharge on urine "

## 2022-09-26 ENCOUNTER — TELEPHONE (OUTPATIENT)
Dept: OTHER | Facility: OTHER | Age: 62
End: 2022-09-26

## 2022-09-27 NOTE — TELEPHONE ENCOUNTER
I called and spoke to patient to let him know that the letter is ready to be picked up and that he should go to the front check in desk in our waiting room to pick it up and that its in the filing cabinets behind check in for staff to retrieve for him 
It is okay to give a work note
Okay to give work note?
Patient is requesting a letter to return to work effective 10/3/22   Please call patient once letter is to be picked up
numerical 0-10

## 2022-09-28 NOTE — PROGRESS NOTES
Daily Note     Today's date: 2022  Patient name: Natasha Nava  : 1960  MRN: [id number]  Referring provider: Hayder Castaneda, *  Dx:   Encounter Diagnosis     ICD-10-CM    1  Continuous leakage of urine  N39 45    2  Pelvic floor weakness, male  M62 89    3  Unspecified lack of coordination  R27 9        Start Time: 1200  Stop Time: 1300  Total time in clinic (min): 60 minutes    Subjective: Pt reports since last visit pain has resolved  After internal exam no pain  Has been trying to go to the bathroom every 2 hours but recently has been more like about every 4  Still having continuous leakage  When he urinates it is about 4 ounces  Reports he is drinking a lot more now- back to his regular amount  Is now getting urge more often- especially after sitting and then standing up     History of Present Illness:   Natasha Nava is a 60 y o  male who presents to OPPT with primary complaint of post-op prostatectomy  They are referred to OPPT by Dr Miriam Mohr reports he had radical prostatectomy 2022 with Aaliyah Crafts with bladder neck reconstruction  He was supposed to be d/c the following AM and was kept and extra day due to leaking urine around the malave cathter  Came home 2022 and was readmitted the next  he lot appetite and  sat down and was shaking and had 103 fever  Was admitted to hospital for 5 days  Last day in the hospital was the   Pulled the catheter   He was grossly incontinent of urine  He immediately started doing kegals  Was unable to do the sit to stand and difficulty with pelvic contractions due to perineal pain  He has been doing kegals 3-4 times a day and found a video online that was slightly different but he has stopped doing those  About a week and a half ago he noticed he gets a feeling of bladder feeling full when he is lying flat    But as soon as he rolls out of bed and gets to toilet and pulls down brief and then has to pinch off and then will have a urine stream   2 times in the last 10 days he woke up in the AM and felt he somewhat had an erection but is not sure because he also had a wet diaper  He no longer has a lifting restriction  He had another UTI start on this past Monday with fever and had urinalysis  Currently on antibiotics  States his bowel function has returned to normal pretty quickly  Will wipe and it is clean but feels like there is still something in there  When he doesn't take mirilax type 2-3  When he takes mirilax type 5  He stopped taking mirilax 2 days ago, and did an enema a few hours ago  No pain with BMs  Since surgery he has been feeling like he has some vocal cord irritation/ hard time getting words out/ a catch in his breath and he runs out of air  Will be talking to ENT about this  Goals  STG to be completed in 8 weeks from 9/22/2022:  1  Ayaan Matilde will be independent with initial home exercise program    2  Ayaandanilo Benzin will demonstrate ability to contract, relax and actively lengthen PFM  3  Ayaandanilo Benzin will report 50% improvement in UI    4  Ayaandanilo Clayton will report pain no greater than 2/10 with sitting with good posture  5  Ayaandanilo Clayton will report wearing 5 or less pads in a day  10  Ayaandanilo Clayton will report voiding every 2-4 hours  LTG to be completed in 12 weeks from 9/22/2022 or upon discharge from PFPT:  1  [place]on will be independent with advanced home exercise program for self-management of symptoms  2  Ayaan Matilde will demonstrate ability to appropriately activate deep core muscles with functional mobility tasks  3  [place]on report 90% improvement or better in UI    4  Ayaandanilo Clayton will be able to wear no pad or liner to decrease risk of urinary tract infection  5  Ayaandanilo Clayton will report pain no greater than 0/10 with sitting for 60 min or more  Objective: See treatment diary below      Assessment: Katerina Allison tolerated today's treatment session well    Patient education provided on urge deferral, bladder training, and HEP for PFM coordination and strengthening  Easton completed all TE with good form and no adverse reactions  Pt benefits from cues to avoid breath holding and for proper form for all Tenna Kras continues to benefit from skilled OPPT services to address urinary incontinence   Will continue to address functional deficits and focus on progression of POC per patient tolerance  Plan: Continue per plan of care  Progress treatment as tolerated  Precautions: standard, post-op     Shawn To-Do:  ·       Go to the bathroom and try to urinate every 2 hours during the day  o  No pushing! Take a deep breath and relax   ·       Kegels: 5 times a day (sitting)  o  10 second hold   o  5 times  o  10 quick flicks   o  NO BREATH HOLDING! ·       Increase fluids! Gradually more water around 5-6 bottles a day   ·       Diaphragmatic Breathing- belly breath in through the nose, out through the mouth like you are blowing out candles (3 min)  ·       Perineal massage with golf ball - 1-2 times a day no more than 5-10 min   ·       Scar massage to abdominal scars     Access Code: [email or web address]  URL: [email or web address]  Date: 09/29/2022  Prepared by: Henry Garzon    Exercises  Chest Press with Core Activation - 1 x daily - 2 sets - 10 reps  Seated Pelvic Floor Contraction - 1 x daily - 3 sets - 10 reps  Seated Pelvic Floor Contraction with Isometric Hip Adduction - 1 x daily - 1 sets - 10 reps  Sit to Stand with Core activation - 1 x daily - 1 sets - 10 reps  Bridge with Core Activation - 1 x daily - 1 sets - 10-20 reps  Step Tap with Pelvic Floor Contraction - 1 x daily - 3 sets - 10 reps  Diaphragmatic Breathing - 1 x daily       9/22/2022 9/29/2022         Patient Ed           PFM anatomy and function K[facility]          Bladder health and habits  KH          Bladder retraining  [address]          Scar massage  [address]          Water intake KH          Urge deferral  KH         Bed mobility and pressure management   KH                                          Neuro Re-Ed           DB with PFM  DB focus on 360* breath exhale 2x inhale, 2 min with PFM         STS with PFM  x10         BF           Bridge with PFM  x5         Pelvic elevator   2 min practice          Step up with PFM contraction   Step tap 4" 1 min                                                      Ther Ex           Warm-up   TM 3% incline 8 min          Seated PFM contraction with hip add  5" hold 5" rest x2 min                                                                            Ther Activity                                 Manual           MFR PFM                                                       Modalities

## 2022-09-29 ENCOUNTER — OFFICE VISIT (OUTPATIENT)
Dept: PHYSICAL THERAPY | Facility: REHABILITATION | Age: 62
End: 2022-09-29
Payer: COMMERCIAL

## 2022-09-29 DIAGNOSIS — R27.9 UNSPECIFIED LACK OF COORDINATION: ICD-10-CM

## 2022-09-29 DIAGNOSIS — M62.89 PELVIC FLOOR WEAKNESS, MALE: ICD-10-CM

## 2022-09-29 DIAGNOSIS — N39.45 CONTINUOUS LEAKAGE OF URINE: Primary | ICD-10-CM

## 2022-09-29 PROCEDURE — 97112 NEUROMUSCULAR REEDUCATION: CPT

## 2022-09-29 PROCEDURE — 97110 THERAPEUTIC EXERCISES: CPT

## 2022-09-29 PROCEDURE — 97530 THERAPEUTIC ACTIVITIES: CPT

## 2022-09-30 ENCOUNTER — HOSPITAL ENCOUNTER (OUTPATIENT)
Dept: CT IMAGING | Facility: HOSPITAL | Age: 62
End: 2022-09-30
Attending: UROLOGY
Payer: COMMERCIAL

## 2022-09-30 ENCOUNTER — OFFICE VISIT (OUTPATIENT)
Dept: UROLOGY | Facility: CLINIC | Age: 62
End: 2022-09-30

## 2022-09-30 VITALS
HEIGHT: 69 IN | BODY MASS INDEX: 33.92 KG/M2 | WEIGHT: 229 LBS | DIASTOLIC BLOOD PRESSURE: 82 MMHG | SYSTOLIC BLOOD PRESSURE: 120 MMHG | HEART RATE: 80 BPM | RESPIRATION RATE: 20 BRPM

## 2022-09-30 DIAGNOSIS — N39.0 URINARY TRACT INFECTION WITHOUT HEMATURIA, SITE UNSPECIFIED: ICD-10-CM

## 2022-09-30 DIAGNOSIS — C61 PROSTATE CANCER (HCC): Primary | ICD-10-CM

## 2022-09-30 DIAGNOSIS — C61 PROSTATE CANCER (HCC): ICD-10-CM

## 2022-09-30 PROCEDURE — 74177 CT ABD & PELVIS W/CONTRAST: CPT

## 2022-09-30 PROCEDURE — G1004 CDSM NDSC: HCPCS

## 2022-09-30 PROCEDURE — 99024 POSTOP FOLLOW-UP VISIT: CPT | Performed by: UROLOGY

## 2022-09-30 PROCEDURE — 87086 URINE CULTURE/COLONY COUNT: CPT | Performed by: UROLOGY

## 2022-09-30 RX ADMIN — IOHEXOL 100 ML: 350 INJECTION, SOLUTION INTRAVENOUS at 15:42

## 2022-09-30 NOTE — PROGRESS NOTES
Patient is status post robotic prostatectomy on August 11th  Patient has had some challenges in the postoperative period  Intraoperatively, the patient did have some periurethral bleeding that was controlled and packed with Surgicel  He did have a postoperative CT scan on August 21st given return to the hospital with infectious signs and symptoms  Klebsiella urinary infection was diagnosed  Patient then underwent a cystogram on August 24th with no evidence of bladder leak  Catheter was subsequently removed the patient had additional issues with Klebsiella bacteriuria and symptoms  I have seen him back for his postoperative visit and due to ongoing expected stress incontinence, the patient has returned to the pelvic floor physical therapy team     He presented today as a walk-in patient  Has been noting some abnormal appearance of the urine drainage  He is concerned given his difficult postoperative course  He reports that he is not having any abdominal pain  He is having normal bowel movements  He is not having fevers and chills  He was able to show me a picture of the drainage and the small brown particulate matter on his incontinence pad  At this point, I am recommending that the patient undergo a stat CT of the abdomen pelvis with IV and oral contrast   This will evaluate for any occult postsurgical issue  Patient will give us a urine sample to check for underlying bacteriuria or recurrent infection  I will contact the patient once the results are complete      /82   Pulse 80   Resp 20   Ht 5' 9" (1 753 m)   Wt 104 kg (229 lb)   BMI 33 82 kg/m²

## 2022-10-01 LAB — BACTERIA UR CULT: NORMAL

## 2022-10-04 NOTE — PROGRESS NOTES
Daily Note     Today's date: 10/6/2022  Patient name: Yola Kaufman  : 1960  MRN: [id number]  Referring provider: Derik Thomas, *  Dx:   Encounter Diagnosis     ICD-10-CM    1  Continuous leakage of urine  N39 45    2  Pelvic floor weakness, male  M62 89    3  Unspecified lack of coordination  R27 9        Start Time: 1505  Stop Time: 1600  Total time in clinic (min): 55 minutes    Subjective: Pt reports urinary incontinence his about the same  Gets urge when he stands up after sitting or lying and then will leak on the way to the bathroom but will have about 10 seconds of stream   Otherwise not much different  He returned to work and has been less consistently with exercises and timed voiding  Feels return to work is helpful for his mental health  Went to support group and found it helpful  He still has soreness at the perineal body  History of Present Illness:   Yola Kaufman is a 60 y o  male who presents to OPPT with primary complaint of post-op prostatectomy  They are referred to OPPT by Dr Ander Farris reports he had radical prostatectomy 2022 with Millie Denton with bladder neck reconstruction  He was supposed to be d/c the following AM and was kept and extra day due to leaking urine around the malave cathter  Came home 2022 and was readmitted the next  he lot appetite and  sat down and was shaking and had 103 fever  Was admitted to hospital for 5 days  Last day in the hospital was the   Pulled the catheter   He was grossly incontinent of urine  He immediately started doing kegals  Was unable to do the sit to stand and difficulty with pelvic contractions due to perineal pain  He has been doing kegals 3-4 times a day and found a video online that was slightly different but he has stopped doing those  About a week and a half ago he noticed he gets a feeling of bladder feeling full when he is lying flat    But as soon as he rolls out of bed and gets to toilet and pulls down brief and then has to pinch off and then will have a urine stream   2 times in the last 10 days he woke up in the AM and felt he somewhat had an erection but is not sure because he also had a wet diaper  He no longer has a lifting restriction  He had another UTI start on this past Monday with fever and had urinalysis  Currently on antibiotics  States his bowel function has returned to normal pretty quickly  Will wipe and it is clean but feels like there is still something in there  When he doesn't take mirilax type 2-3  When he takes mirilax type 5  He stopped taking mirilax 2 days ago, and did an enema a few hours ago  No pain with BMs  Since surgery he has been feeling like he has some vocal cord irritation/ hard time getting words out/ a catch in his breath and he runs out of air  Will be talking to ENT about this  Goals  STG to be completed in 8 weeks from 9/22/2022:  [facility]  [facility]    2  [facility] Krissy will demonstrate ability to contract, relax and actively lengthen PFM  3  [facility] Krisys will report 50% improvement in UI    4  [facility] Krissy will report pain no greater than 2/10 with sitting with good posture  5  [facility] Krissy will report wearing 5 or less pads in a day  10  [facility] Krissy will report voiding every 2-4 hours  LTG to be completed in 12 weeks from 9/22/2022 or upon discharge from PFPT:  1  [place] will be independent with advanced home exercise program for self-management of symptoms  2  [facility] to appropriately activate deep core muscles with functional mobility tasks  3  [facility] report 90% improvement or better in UI    4  [facility] Krissy will be able to wear no pad or liner to decrease risk of urinary tract infection  5  [facility] Krissy will report pain no greater than 0/10 with sitting for 60 min or more         Objective: See treatment diary below      Assessment: Veronika Acevedo tolerated today's treatment session well   Patient education provided on progression of strengthening program and importance of maintaining voiding scheule during work hours for bladder training  [facility] completed all TE with good form and no adverse reactions  Pt continues to have significant UI  Today reporting more pain in perineal area than previous- at about 3/10  Pankaj Acosta continues to benefit from skilled OPPT services to address urinary incontinence   Will continue to address functional deficits and focus on progression of POC per patient tolerance  Plan: Continue per plan of care  Progress treatment as tolerated  Precautions: standard, post-op     Shawn To-Do:  ·       Go to the bathroom and try to urinate every 2 hours during the day  o  No pushing! Take a deep breath and relax   ·       Kegels: 5 times a day (sitting)  o  10 second hold   o  5 times  o  10 quick flicks   o  NO BREATH HOLDING! ·       Increase fluids! Gradually more water around 5-6 bottles a day   ·       Diaphragmatic Breathing- belly breath in through the nose, out through the mouth like you are blowing out candles (3 min)  ·       Perineal massage with golf ball - 1-2 times a day no more than 5-10 min   ·       Scar massage to abdominal scars     Access Code: [email or web address]  URL: [email or web address]  Date: 09/29/2022  Prepared by: Karyn Whitmore    Exercises  Chest Press with Core Activation - 1 x daily - 2 sets - 10 reps  Seated Pelvic Floor Contraction - 1 x daily - 3 sets - 10 reps  Seated Pelvic Floor Contraction with Isometric Hip Adduction - 1 x daily - 1 sets - 10 reps  Sit to Stand with Core activation - 1 x daily - 1 sets - 10 reps  Bridge with Core Activation - 1 x daily - 1 sets - 10-20 reps  Step Tap with Pelvic Floor Contraction - 1 x daily - 3 sets - 10 reps  Diaphragmatic Breathing - 1 x daily       9/22/2022  9/29/2022 10/6/2022        Patient Ed           PFM anatomy and function [facility]          Bladder health and habits  [facility] Bladder retraining  [address]          Scar massage  [address]          Water intake [address]          Urge deferral  [address]         Bed mobility and pressure management   KH                                          Neuro Re-Ed           DB with PFM  DB focus on 360* breath exhale 2x inhale, 2 min with PFM         STS with PFM  x10         BF           Bridge with PFM  x5         Pelvic elevator   2 min practice          Step up with PFM contraction   Step tap 4" 1 min  Step up 6" box 1 min forward, 1 min step up and over lateral        Push/pull with PFM at reformer   1 min each        kettel cardenas squat    10# x10 narrow SALENA, x10 wide SALENA        Heel raises with PFM contraction    x20                   Ther Ex           Warm-up   TM 3% incline 8 min  TM 10 min         Seated PFM contraction with hip add  5" hold 5" rest x2 min          Lateral walk with PFM contraction    Red band x2 laps                                                                Ther Activity                                 Manual           MFR PFM                                                       Modalities

## 2022-10-06 ENCOUNTER — OFFICE VISIT (OUTPATIENT)
Dept: PHYSICAL THERAPY | Facility: REHABILITATION | Age: 62
End: 2022-10-06
Payer: COMMERCIAL

## 2022-10-06 DIAGNOSIS — N39.45 CONTINUOUS LEAKAGE OF URINE: Primary | ICD-10-CM

## 2022-10-06 DIAGNOSIS — M62.89 PELVIC FLOOR WEAKNESS, MALE: ICD-10-CM

## 2022-10-06 DIAGNOSIS — R27.9 UNSPECIFIED LACK OF COORDINATION: ICD-10-CM

## 2022-10-06 PROCEDURE — 97112 NEUROMUSCULAR REEDUCATION: CPT

## 2022-10-06 PROCEDURE — 97110 THERAPEUTIC EXERCISES: CPT

## 2022-10-09 NOTE — PROGRESS NOTES
Daily Note     Today's date: 10/13/2022  Patient name: Bryan eBe  : 1960  MRN: [id number]  Referring provider: Tuyet Patricio, *  Dx:   Encounter Diagnosis     ICD-10-CM    1  Continuous leakage of urine  N39 45    2  Pelvic floor weakness, male  M62 89    3  Unspecified lack of coordination  R27 9        Start Time: 1710  Stop Time: 1800  Total time in clinic (min): 50 minutes    Subjective: Pt continues to feel improvement  Some days worse than others  Today was able to stand up from chair and get to bathroom with little leakage and void a good amount in the toilet which he is very pleased about  History of Present Illness:   Bryan Bee is a 60 y o  male who presents to OPPT with primary complaint of post-op prostatectomy  They are referred to OPPT by Dr Mitra Rose reports he had radical prostatectomy 2022 with Charu Dickens with bladder neck reconstruction  He was supposed to be d/c the following AM and was kept and extra day due to leaking urine around the malave cathter  Came home 2022 and was readmitted the next  he lot appetite and  sat down and was shaking and had 103 fever  Was admitted to hospital for 5 days  Last day in the hospital was the   Pulled the catheter   He was grossly incontinent of urine  He immediately started doing kegals  Was unable to do the sit to stand and difficulty with pelvic contractions due to perineal pain  He has been doing kegals 3-4 times a day and found a video online that was slightly different but he has stopped doing those  About a week and a half ago he noticed he gets a feeling of bladder feeling full when he is lying flat    But as soon as he rolls out of bed and gets to toilet and pulls down brief and then has to pinch off and then will have a urine stream   2 times in the last 10 days he woke up in the AM and felt he somewhat had an erection but is not sure because he also had a wet diaper  He no longer has a lifting restriction  He had another UTI start on this past Monday with fever and had urinalysis  Currently on antibiotics  States his bowel function has returned to normal pretty quickly  Will wipe and it is clean but feels like there is still something in there  When he doesn't take mirilax type 2-3  When he takes mirilax type 5  He stopped taking mirilax 2 days ago, and did an enema a few hours ago  No pain with BMs  Since surgery he has been feeling like he has some vocal cord irritation/ hard time getting words out/ a catch in his breath and he runs out of air  Will be talking to ENT about this  Goals  STG to be completed in 8 weeks from 9/22/2022:  1  Earl Morocho will be independent with initial home exercise program    2  [facility]arl Morocho will demonstrate ability to contract, relax and actively lengthen PFM  3  Earl Morocho will report 50% improvement in UI    4  Earl Morocho will report pain no greater than 2/10 with sitting with good posture  5  Earl Morocho will report wearing 5 or less pads in a day  10  Earl Morocho will report voiding every 2-4 hours  LTG to be completed in 12 weeks from 9/22/2022 or upon discharge from PFPT:  1  [facility] will be independent with advanced home exercise program for self-management of symptoms  2  Earl Morocho will demonstrate ability to appropriately activate deep core muscles with functional mobility tasks  3  [facility] report 90% improvement or better in UI    4  Earl Morocho will be able to wear no pad or liner to decrease risk of urinary tract infection  5  Earl Morocho will report pain no greater than 0/10 with sitting for 60 min or more  Objective: See treatment diary below      Assessment: Miranda Lee tolerated today's treatment session well  Patient education provided on progression of strengthening and POC  [place] completed all TE with good form and no adverse reactions  Earl Morocho continues to benefit from skilled OPPT services to address urinary incontinence    Will continue to address functional deficits and focus on progression of POC per patient tolerance  Plan: Continue per plan of care  Progress treatment as tolerated  Precautions: standard, post-op     [facility]’s To-Do:  ·       Go to the bathroom and try to urinate every 2 hours during the day  o  No pushing! Take a deep breath and relax   ·       Kegels: 5 times a day (sitting)  o  10 second hold   o  5 times  o  10 quick flicks   o  NO BREATH HOLDING! ·       Increase fluids! Gradually more water around 5-6 bottles a day   ·       Diaphragmatic Breathing- belly breath in through the nose, out through the mouth like you are blowing out candles (3 min)  ·       Perineal massage with golf ball - 1-2 times a day no more than 5-10 min   ·       Scar massage to abdominal scars     Access Code: [email or web address]  URL: [email or web address]  Date: 09/29/2022  Prepared by: Malorie Purvis    Exercises  Chest Press with Core Activation - 1 x daily - 2 sets - 10 reps  Seated Pelvic Floor Contraction - 1 x daily - 3 sets - 10 reps  Seated Pelvic Floor Contraction with Isometric Hip Adduction - 1 x daily - 1 sets - 10 reps  Sit to Stand with Core activation - 1 x daily - 1 sets - 10 reps  Bridge with Core Activation - 1 x daily - 1 sets - 10-20 reps  Step Tap with Pelvic Floor Contraction - 1 x daily - 3 sets - 10 reps  Diaphragmatic Breathing - 1 x daily       9/22/2022  9/29/2022 10/6/2022 10/13/2022       Patient Ed           PFM anatomy and function [facility]          Bladder health and habits  [facility]          Bladder retraining  [address]          Scar massage  [facility]          Water intake [facility]          Urge deferral  [address]         Bed mobility and pressure management   KH                                          Neuro Re-Ed           DB with PFM  DB focus on 360* breath exhale 2x inhale, 2 min with PFM         STS with PFM  x10  Single leg with 4" box x10 each LE        BF           Bridge with PFM  x5         Pelvic elevator   2 min practice          Step up with PFM contraction   Step tap 4" 1 min  Step up 6" box 1 min forward, 1 min step up and over lateral        Push/pull with PFM at reformer   1 min each        kettel cardenas squat    10# x10 narrow SALENA, x10 wide SALENA Squat with PFM into a heel raise breath x15       Heel raises with PFM contraction    x20        PNF lift with med ball     30" each side       Ladder drills     KH       Reformer hip abd in tall kneel    1' each direction                   Ther Ex           Warm-up   TM 3% incline 8 min  TM 10 min  TM 8 min        Seated PFM contraction with hip add  5" hold 5" rest x2 min          Lateral walk with PFM contraction    Red band x2 laps  Red band x3 laps                                                               Ther Activity                                 Manual           MFR PFM                                                       Modalities

## 2022-10-13 ENCOUNTER — OFFICE VISIT (OUTPATIENT)
Dept: PHYSICAL THERAPY | Facility: REHABILITATION | Age: 62
End: 2022-10-13
Payer: COMMERCIAL

## 2022-10-13 DIAGNOSIS — M62.89 PELVIC FLOOR WEAKNESS, MALE: ICD-10-CM

## 2022-10-13 DIAGNOSIS — R27.9 UNSPECIFIED LACK OF COORDINATION: ICD-10-CM

## 2022-10-13 DIAGNOSIS — N39.45 CONTINUOUS LEAKAGE OF URINE: Primary | ICD-10-CM

## 2022-10-13 PROCEDURE — 97110 THERAPEUTIC EXERCISES: CPT

## 2022-10-13 PROCEDURE — 97112 NEUROMUSCULAR REEDUCATION: CPT

## 2022-10-14 NOTE — PROGRESS NOTES
Daily Note     Today's date: 10/14/2022  Patient name: Beatriz Villavicencio  : 1960  MRN: [id number]  Referring provider: Lina Soto, *  Dx:   Encounter Diagnosis     ICD-10-CM    1  Continuous leakage of urine  N39 45    2  Pelvic floor weakness, male  M62 89    3  Unspecified lack of coordination  R27 9                   Subjective: ***    History of Present Illness:   Beatriz Villavicencio is a 60 y o  male who presents to OPPT with primary complaint of post-op prostatectomy  They are referred to OPPT by Dr Daniel Phelan reports he had radical prostatectomy 2022 with Brant Cane with bladder neck reconstruction  He was supposed to be d/c the following AM and was kept and extra day due to leaking urine around the malave cathter  Came home 2022 and was readmitted the next  he lot appetite and  sat down and was shaking and had 103 fever  Was admitted to hospital for 5 days  Last day in the hospital was the   Pulled the catheter   He was grossly incontinent of urine  He immediately started doing kegals  Was unable to do the sit to stand and difficulty with pelvic contractions due to perineal pain  He has been doing kegals 3-4 times a day and found a video online that was slightly different but he has stopped doing those  About a week and a half ago he noticed he gets a feeling of bladder feeling full when he is lying flat  But as soon as he rolls out of bed and gets to toilet and pulls down brief and then has to pinch off and then will have a urine stream   2 times in the last 10 days he woke up in the AM and felt he somewhat had an erection but is not sure because he also had a wet diaper  He no longer has a lifting restriction  He had another UTI start on this past Monday with fever and had urinalysis  Currently on antibiotics  States his bowel function has returned to normal pretty quickly    Will wipe and it is clean but feels like there is still something in there  When he doesn't take mirilax type 2-3  When he takes mirilax type 5  He stopped taking mirilax 2 days ago, and did an enema a few hours ago  No pain with BMs  Since surgery he has been feeling like he has some vocal cord irritation/ hard time getting words out/ a catch in his breath and he runs out of air  Will be talking to ENT about this  Goals  STG to be completed in 8 weeks from 9/22/2022:  1  Shaq Sensor will be independent with initial home exercise program    2  Shaq Sensor will demonstrate ability to contract, relax and actively lengthen PFM  3  Shaq Sensor will report 50% improvement in UI    4  Shaq Sensor will report pain no greater than 2/10 with sitting with good posture  5  Shaq Sensor will report wearing 5 or less pads in a day  10  Shaq Sensor will report voiding every 2-4 hours  LTG to be completed in 12 weeks from 9/22/2022 or upon discharge from PFPT:  1  [place] will be independent with advanced home exercise program for self-management of symptoms  2  Shaq Sensor will demonstrate ability to appropriately activate deep core muscles with functional mobility tasks  3  [place] report 90% improvement or better in UI    4  Shaq Sensor will be able to wear no pad or liner to decrease risk of urinary tract infection  5  Shaq Sensor will report pain no greater than 0/10 with sitting for 60 min or more  Objective: See treatment diary below      Assessment: Dion Yoon tolerated today's treatment session well  Patient education provided on [facility] education options:10432}  [place] completed all TE with good form and no adverse reactions  ***  [facility] continues to benefit from skilled OPPT services to address {[facility] Systemsymptoms:74911}  Will continue to address functional deficits and focus on progression of POC per patient tolerance  Plan: Continue per plan of care  Progress treatment as tolerated         Precautions: standard, post-op     [facility]’s To-Do:  ·       Go to the bathroom and try to urinate every 2 hours during the day  o  No pushing! Take a deep breath and relax   ·       Kegels: 5 times a day (sitting)  o  10 second hold   o  5 times  o  10 quick flicks   o  NO BREATH HOLDING! ·       Increase fluids! Gradually more water around 5-6 bottles a day   ·       Diaphragmatic Breathing- belly breath in through the nose, out through the mouth like you are blowing out candles (3 min)  ·       Perineal massage with golf ball - 1-2 times a day no more than 5-10 min   ·       Scar massage to abdominal scars     Access Code: [email or web address]  URL: [email or web address]  Date: 09/29/2022  Prepared by: Miguel Foley    Exercises  Chest Press with Core Activation - 1 x daily - 2 sets - 10 reps  Seated Pelvic Floor Contraction - 1 x daily - 3 sets - 10 reps  Seated Pelvic Floor Contraction with Isometric Hip Adduction - 1 x daily - 1 sets - 10 reps  Sit to Stand with Core activation - 1 x daily - 1 sets - 10 reps  Bridge with Core Activation - 1 x daily - 1 sets - 10-20 reps  Step Tap with Pelvic Floor Contraction - 1 x daily - 3 sets - 10 reps  Diaphragmatic Breathing - 1 x daily       9/22/2022  9/29/2022 10/6/2022 10/13/2022 10/18/2022      Patient Ed           PFM anatomy and function [facility]          Bladder health and habits  [facility]          Bladder retraining  [address]          Scar massage  [facility]          Water intake [facility]          Urge deferral  [address]         Bed mobility and pressure management   KH                                          Neuro Re-Ed           DB with PFM  DB focus on 360* breath exhale 2x inhale, 2 min with PFM         STS with PFM  x10  Single leg with 4" box x10 each LE        BF           Bridge with PFM  x5         Pelvic elevator   2 min practice          Step up with PFM contraction   Step tap 4" 1 min  Step up 6" box 1 min forward, 1 min step up and over lateral        Push/pull with PFM at reformer   1 min each        kettel cardenas squat    10# x10 narrow SALENA, x10 wide SALENA Squat with PFM into a heel raise breath x15       Heel raises with PFM contraction    x20        PNF lift with med ball     30" each side       Ladder drills     [address]       Reformer hip abd in tall kneel    1' each direction                   Ther Ex           Warm-up   TM 3% incline 8 min  TM 10 min  TM 8 min        Seated PFM contraction with hip add  5" hold 5" rest x2 min          Lateral walk with PFM contraction    Red band x2 laps  Red band x3 laps                                                               Ther Activity                                 Manual           MFR PFM                                                       Modalities

## 2022-10-18 ENCOUNTER — APPOINTMENT (OUTPATIENT)
Dept: PHYSICAL THERAPY | Facility: REHABILITATION | Age: 62
End: 2022-10-18

## 2022-10-18 DIAGNOSIS — M62.89 PELVIC FLOOR WEAKNESS, MALE: ICD-10-CM

## 2022-10-18 DIAGNOSIS — R27.9 UNSPECIFIED LACK OF COORDINATION: ICD-10-CM

## 2022-10-18 DIAGNOSIS — N39.45 CONTINUOUS LEAKAGE OF URINE: Primary | ICD-10-CM

## 2022-10-22 PROBLEM — R50.9 FEVER IN ADULT: Status: RESOLVED | Noted: 2022-08-21 | Resolved: 2022-10-22

## 2022-10-22 NOTE — PROGRESS NOTES
Daily Note     Today's date: 10/25/2022  Patient name: Adebayo Villalobos  : 1960  MRN: [id number]  Referring provider: Abbi Hargrove, *  Dx:   Encounter Diagnosis     ICD-10-CM    1  Continuous leakage of urine  N39 45    2  Pelvic floor weakness, male  M62 89    3  Unspecified lack of coordination  R27 9        Start Time: 1355  Stop Time: 1455  Total time in clinic (min): 60 minutes    Subjective: Pt reports he is getting a good urge when he sits down and feels like bladder fills  But has a hard time when he is on his feet or walking  Feels UI is about the same  Recognizes that work limits his ability to complete timed voiding  History of Present Illness:   Adebayo Villalobos is a 60 y o  male who presents to OPPT with primary complaint of post-op prostatectomy  They are referred to OPPT by Dr Dayna Chock Mauricio Ahumada reports he had radical prostatectomy 2022 with Irena Enter with bladder neck reconstruction  He was supposed to be d/c the following AM and was kept and extra day due to leaking urine around the malave cathter  Came home 2022 and was readmitted the next  he lot appetite and  sat down and was shaking and had 103 fever  Was admitted to hospital for 5 days  Last day in the hospital was the   Pulled the catheter   He was grossly incontinent of urine  He immediately started doing kegals  Was unable to do the sit to stand and difficulty with pelvic contractions due to perineal pain  He has been doing kegals 3-4 times a day and found a video online that was slightly different but he has stopped doing those  About a week and a half ago he noticed he gets a feeling of bladder feeling full when he is lying flat    But as soon as he rolls out of bed and gets to toilet and pulls down brief and then has to pinch off and then will have a urine stream   2 times in the last 10 days he woke up in the AM and felt he somewhat had an erection but is not sure because he also had a wet diaper  He no longer has a lifting restriction  He had another UTI start on this past Monday with fever and had urinalysis  Currently on antibiotics  States his bowel function has returned to normal pretty quickly  Will wipe and it is clean but feels like there is still something in there  When he doesn't take mirilax type 2-3  When he takes mirilax type 5  He stopped taking mirilax 2 days ago, and did an enema a few hours ago  No pain with BMs  Since surgery he has been feeling like he has some vocal cord irritation/ hard time getting words out/ a catch in his breath and he runs out of air  Will be talking to ENT about this  Goals  STG to be completed in 8 weeks from 9/22/2022:  1  Carringtonmichel Lepe will be independent with initial home exercise program    2  Carrington Lepe will demonstrate ability to contract, relax and actively lengthen PFM  3  Carrington Lepe will report 50% improvement in UI    4  Carrington Lepe will report pain no greater than 2/10 with sitting with good posture  5  Carrington Lepe will report wearing 5 or less pads in a day  10  Carrington Lepe will report voiding every 2-4 hours  LTG to be completed in 12 weeks from 9/22/2022 or upon discharge from PFPT:  1  [place] will be independent with advanced home exercise program for self-management of symptoms  2  Carrington Lepe will demonstrate ability to appropriately activate deep core muscles with functional mobility tasks  3  [place]on report 90% improvement or better in UI    4  Carrington Lepe will be able to wear no pad or liner to decrease risk of urinary tract infection  5  Carrington Lepe will report pain no greater than 0/10 with sitting for 60 min or more  Objective: See treatment diary below      Assessment: Adelina Arshad tolerated today's treatment session well  Patient education provided on importance of relaxation of PFMs and bladder training  Reagan Navarro completed all TE with good form and no adverse reactions   Pt continues to have leakage - worse with sit to stand   Feels his urge is improving slightly but still only getting most urge after prolonged sitting  Taylor Cook continues to benefit from skilled OPPT services to address urinary incontinence   Will continue to address functional deficits and focus on progression of POC per patient tolerance  Plan: Continue per plan of care  Progress treatment as tolerated  Precautions: standard, post-op     Easton’s To-Do:  ·       Go to the bathroom and try to urinate every 2 hours during the day  o  No pushing! Take a deep breath and relax   ·       Kegels: 5 times a day (sitting)  o  10 second hold   o  5 times  o  10 quick flicks   o  NO BREATH HOLDING! ·       Increase fluids! Gradually more water around 5-6 bottles a day   ·       Diaphragmatic Breathing- belly breath in through the nose, out through the mouth like you are blowing out candles (3 min)  ·       Perineal massage with golf ball - 1-2 times a day no more than 5-10 min   ·       Scar massage to abdominal scars     Access Code: [email or web address]  URL: [email or web address]  Date: 09/29/2022  Prepared by: General Marisa    Exercises  Chest Press with Core Activation - 1 x daily - 2 sets - 10 reps  Seated Pelvic Floor Contraction - 1 x daily - 3 sets - 10 reps  Seated Pelvic Floor Contraction with Isometric Hip Adduction - 1 x daily - 1 sets - 10 reps  Sit to Stand with Core activation - 1 x daily - 1 sets - 10 reps  Bridge with Core Activation - 1 x daily - 1 sets - 10-20 reps  Step Tap with Pelvic Floor Contraction - 1 x daily - 3 sets - 10 reps  Diaphragmatic Breathing - 1 x daily    10/25/2022   [place] To Do:   · Pelvic contraction in standing    · Practice breathing- put hands on ribs and feel them expand, breathe “down” into the abdomen and pelvis      · Really focus on sit to stand with pressure management (exhale and pelvic contraction as you stand)   · Urge deferral (when you stand up and the urge hits)     ?  Take a couple deep breaths and relax   ? Do 5 pelvic floor contractions    ? Do some heel raises and wait for the urge to calm down      · When you feel like you are holding tension- take a deep breath and as you exhale let all the tension out of your body   · Remember the relax is just as important as the contract           9/22/2022  9/29/2022 10/6/2022 10/13/2022 10/25/2022      Patient Ed           PFM anatomy and function [facility]          Bladder health and habits  [facility]          Bladder retraining  [facility]          Scar massage  [facility]          Water intake [facility]          Urge deferral  [facility]   reviewed       Bed mobility and pressure management                                             Neuro Re-Ed           DB with PFM  DB focus on 360* breath exhale 2x inhale, 2 min with PFM   Reviewed breathing mechanics      STS with PFM  x10  Single leg with 4" box x10 each LE  From chair with focus on exhale with work x15      BF           Bridge with PFM  x5         Pelvic elevator   2 min practice    Completed   100-50-0 x5  -0 x5  188-45-9-basement x5      Step up with PFM contraction   Step tap 4" 1 min  Step up 6" box 1 min forward, 1 min step up and over lateral        Push/pull with PFM at reformer   1 min each        kettel cardenas squat    10# x10 narrow SALENA, x10 wide SALENA Squat with PFM into a heel raise breath x15       Heel raises with PFM contraction    x20        PNF lift with med ball     30" each side       Ladder drills            Reformer hip abd in tall kneel    1' each direction                   Ther Ex           Warm-up   TM 3% incline 8 min  TM 10 min  TM 8 min  TM 8 min       Seated PFM contraction with hip add  5" hold 5" rest x2 min          Lateral walk with PFM contraction    Red band x2 laps  Red band x3 laps        PFM contraction with staggered stance     5" hold x10 alternating LEs      Bicep curl with OHP (PFM and exhale on OHP)     5# x15      Standing hip abd with PFM on lift with exhale      1 min each LE      Lateral walk and tandem walk on BB (anticipatory PFM control)     4 laps each                  Ther Activity                                 Manual           MFR PFM                                                        Modalities

## 2022-10-25 ENCOUNTER — OFFICE VISIT (OUTPATIENT)
Dept: PHYSICAL THERAPY | Facility: CLINIC | Age: 62
End: 2022-10-25
Payer: COMMERCIAL

## 2022-10-25 DIAGNOSIS — N39.45 CONTINUOUS LEAKAGE OF URINE: Primary | ICD-10-CM

## 2022-10-25 DIAGNOSIS — M62.89 PELVIC FLOOR WEAKNESS, MALE: ICD-10-CM

## 2022-10-25 DIAGNOSIS — Z12.12 SCREENING FOR COLORECTAL CANCER: ICD-10-CM

## 2022-10-25 DIAGNOSIS — R27.9 UNSPECIFIED LACK OF COORDINATION: ICD-10-CM

## 2022-10-25 DIAGNOSIS — Z12.11 SCREENING FOR COLORECTAL CANCER: ICD-10-CM

## 2022-10-25 PROCEDURE — 97530 THERAPEUTIC ACTIVITIES: CPT

## 2022-10-25 PROCEDURE — 97112 NEUROMUSCULAR REEDUCATION: CPT

## 2022-10-25 PROCEDURE — 97110 THERAPEUTIC EXERCISES: CPT

## 2022-11-05 NOTE — PROGRESS NOTES
Daily Note     Today's date: 2022  Patient name: Anahi Emery  : 1960  MRN: [id number]  Referring provider: Tatiana Bailey, *  Dx:   Encounter Diagnosis     ICD-10-CM    1  Continuous leakage of urine  N39 45    2  Pelvic floor weakness, male  M62 89    3  Unspecified lack of coordination  R27 9                   Subjective: ***    Initial Evaluation    Today's Date: 2022  Patient name: Anahi Emery  : 1960  MRN: [id number]  Referring provider: Tatiana Bailey, *  Dx:  Encounter Diagnosis     ICD-10-CM    1  Continuous leakage of urine  N39 45    2  Pelvic floor weakness, male  M62 89    3  Unspecified lack of coordination  R27 9                          History of Present Illness:   Anahi Emery is a 60 y o  male who presents to OPPT with primary complaint of post-op prostatectomy  They are referred to OPPT by Dr Aaliyah Pardo reports he had radical prostatectomy 2022 with Lalo Santana with bladder neck reconstruction  He was supposed to be d/c the following AM and was kept and extra day due to leaking urine around the malave cathter  Came home 2022 and was readmitted the next  he lot appetite and  sat down and was shaking and had 103 fever  Was admitted to hospital for 5 days  Last day in the hospital was the   Pulled the catheter   He was grossly incontinent of urine  He immediately started doing kegals  Was unable to do the sit to stand and difficulty with pelvic contractions due to perineal pain  He has been doing kegals 3-4 times a day and found a video online that was slightly different but he has stopped doing those  About a week and a half ago he noticed he gets a feeling of bladder feeling full when he is lying flat    But as soon as he rolls out of bed and gets to toilet and pulls down brief and then has to pinch off and then will have a urine stream   2 times in the last 10 days he woke up in the AM and felt he somewhat had an erection but is not sure because he also had a wet diaper  He no longer has a lifting restriction  He had another UTI start on this past Monday with fever and had urinalysis  Currently on antibiotics  States his bowel function has returned to normal pretty quickly  Will wipe and it is clean but feels like there is still something in there  When he doesn't take mirilax type 2-3  When he takes mirilax type 5  He stopped taking mirilax 2 days ago, and did an enema a few hours ago  No pain with BMs  Since surgery he has been feeling like he has some vocal cord irritation/ hard time getting words out/ a catch in his breath and he runs out of air  Will be talking to ENT about this  Goals  STG to be completed in 8 weeks from 9/22/2022:  1  Nena Dunham will be independent with initial home exercise program    2  Nena Dunham will demonstrate ability to contract, relax and actively lengthen PFM  3  Nena Dunham will report 50% improvement in UI    4  Nena Dunham will report pain no greater than 2/10 with sitting with good posture  5  Nena Dunham will report wearing 5 or less pads in a day  10  Nena Dunham will report voiding every 2-4 hours  LTG to be completed in 12 weeks from 9/22/2022 or upon discharge from PFPT:  1  [place] will be independent with advanced home exercise program for self-management of symptoms  2  Nena Dunham will demonstrate ability to appropriately activate deep core muscles with functional mobility tasks  3  [place] report 90% improvement or better in UI    4  Nena Dunham will be able to wear no pad or liner to decrease risk of urinary tract infection  5  Nena Dunham will report pain no greater than 0/10 with sitting for 60 min or more  Objective: See treatment diary below      Assessment: Perry Santana tolerated today's treatment session well  Patient education provided on [facility] education options:09708}  [place] completed all TE with good form and no adverse reactions   ***  Nena Dunham continues to benefit from skilled OPPT services to address {lotusdailysymptoms:24056}  Will continue to address functional deficits and focus on progression of POC per patient tolerance  Plan: Continue per plan of care  Progress treatment as tolerated  Precautions: standard, post-op     [place]on’s To-Do:  ·       Go to the bathroom and try to urinate every 2 hours during the day  o  No pushing! Take a deep breath and relax   ·       Kegels: 5 times a day (sitting)  o  10 second hold   o  5 times  o  10 quick flicks   o  NO BREATH HOLDING! ·       Increase fluids! Gradually more water around 5-6 bottles a day   ·       Diaphragmatic Breathing- belly breath in through the nose, out through the mouth like you are blowing out candles (3 min)  ·       Perineal massage with golf ball - 1-2 times a day no more than 5-10 min   ·       Scar massage to abdominal scars     Access Code: [email or web address]  URL: [email or web address]  Date: 09/29/2022  Prepared by: Gillian Moreno    Exercises  Chest Press with Core Activation - 1 x daily - 2 sets - 10 reps  Seated Pelvic Floor Contraction - 1 x daily - 3 sets - 10 reps  Seated Pelvic Floor Contraction with Isometric Hip Adduction - 1 x daily - 1 sets - 10 reps  Sit to Stand with Core activation - 1 x daily - 1 sets - 10 reps  Bridge with Core Activation - 1 x daily - 1 sets - 10-20 reps  Step Tap with Pelvic Floor Contraction - 1 x daily - 3 sets - 10 reps  Diaphragmatic Breathing - 1 x daily    10/25/2022   [place] To Do:   · Pelvic contraction in standing    · Practice breathing- put hands on ribs and feel them expand, breathe “down” into the abdomen and pelvis      · Really focus on sit to stand with pressure management (exhale and pelvic contraction as you stand)   · Urge deferral (when you stand up and the urge hits)     ?  Take a couple deep breaths and relax   ? Do 5 pelvic floor contractions    ? Do some heel raises and wait for the urge to calm down      · When you feel like you are holding tension- take a deep breath and as you exhale let all the tension out of your body   · Remember the relax is just as important as the contract           9/22/2022  9/29/2022 10/6/2022 10/13/2022 10/25/2022 11/8/2022     Patient Ed           PFM anatomy and function [facility]          Bladder health and habits  [facility]          Bladder retraining  [facility]          Scar massage  [facility]          Water intake [facility]          Urge deferral  [facility]   reviewed       Bed mobility and pressure management   [facility]                                          Neuro Re-Ed           DB with PFM  DB focus on 360* breath exhale 2x inhale, 2 min with PFM   Reviewed breathing mechanics      STS with PFM  x10  Single leg with 4" box x10 each LE  From chair with focus on exhale with work x15      BF           Bridge with PFM  x5         Pelvic elevator   2 min practice    Completed   100-50-0 x5  -0 x5  341-35-5-basement x5      Step up with PFM contraction   Step tap 4" 1 min  Step up 6" box 1 min forward, 1 min step up and over lateral        Push/pull with PFM at reformer   1 min each        kettel cardenas squat    10# x10 narrow SALENA, x10 wide SALENA Squat with PFM into a heel raise breath x15       Heel raises with PFM contraction    x20        PNF lift with med ball     30" each side       Ladder drills     KH       Reformer hip abd in tall kneel    1' each direction                   Ther Ex           Warm-up   TM 3% incline 8 min  TM 10 min  TM 8 min  TM 8 min       Seated PFM contraction with hip add  5" hold 5" rest x2 min          Lateral walk with PFM contraction    Red band x2 laps  Red band x3 laps        PFM contraction with staggered stance     5" hold x10 alternating LEs      Bicep curl with OHP (PFM and exhale on OHP)     5# x15      Standing hip abd with PFM on lift with exhale      1 min each LE      Lateral walk and tandem walk on BB (anticipatory PFM control)     4 laps each                  Ther Activity Manual           MFR PFM                                                        Modalities

## 2022-11-08 ENCOUNTER — OFFICE VISIT (OUTPATIENT)
Dept: PHYSICAL THERAPY | Facility: CLINIC | Age: 62
End: 2022-11-08

## 2022-11-08 DIAGNOSIS — R27.9 UNSPECIFIED LACK OF COORDINATION: ICD-10-CM

## 2022-11-08 DIAGNOSIS — N39.45 CONTINUOUS LEAKAGE OF URINE: Primary | ICD-10-CM

## 2022-11-08 DIAGNOSIS — M62.89 PELVIC FLOOR WEAKNESS, MALE: ICD-10-CM

## 2022-11-22 ENCOUNTER — APPOINTMENT (OUTPATIENT)
Dept: PHYSICAL THERAPY | Facility: CLINIC | Age: 62
End: 2022-11-22

## 2022-11-22 DIAGNOSIS — I10 HYPERTENSION, UNSPECIFIED TYPE: ICD-10-CM

## 2022-11-22 RX ORDER — IRBESARTAN 75 MG/1
75 TABLET ORAL DAILY
Qty: 90 TABLET | Refills: 3 | Status: SHIPPED | OUTPATIENT
Start: 2022-11-22 | End: 2022-12-01 | Stop reason: SDUPTHER

## 2022-11-23 NOTE — PROGRESS NOTES
Daily Note     Today's date: 2022  Patient name: Jermaine Villaseñor  : 1960  MRN: [id number]  Referring provider: Luis Matt, *  Dx:   Encounter Diagnosis     ICD-10-CM    1  Continuous leakage of urine  N39 45       2  Pelvic floor weakness, male  M62 89       3  Unspecified lack of coordination  R27 9                      Subjective: ***    History of Present Illness:   Jermaine Villaseñor is a 60 y o  male who presents to OPPT with primary complaint of post-op prostatectomy  They are referred to OPPT by Dr Tanya Rabago reports he had radical prostatectomy 2022 with Landon Dennis with bladder neck reconstruction  He was supposed to be d/c the following AM and was kept and extra day due to leaking urine around the malave cathter  Came home 2022 and was readmitted the next  he lot appetite and  sat down and was shaking and had 103 fever  Was admitted to hospital for 5 days  Last day in the hospital was the   Pulled the catheter   He was grossly incontinent of urine  He immediately started doing kegals  Was unable to do the sit to stand and difficulty with pelvic contractions due to perineal pain  He has been doing kegals 3-4 times a day and found a video online that was slightly different but he has stopped doing those  About a week and a half ago he noticed he gets a feeling of bladder feeling full when he is lying flat  But as soon as he rolls out of bed and gets to toilet and pulls down brief and then has to pinch off and then will have a urine stream   2 times in the last 10 days he woke up in the AM and felt he somewhat had an erection but is not sure because he also had a wet diaper  He no longer has a lifting restriction  He had another UTI start on this past Monday with fever and had urinalysis  Currently on antibiotics  States his bowel function has returned to normal pretty quickly    Will wipe and it is clean but feels like there is still something in there  When he doesn't take mirilax type 2-3  When he takes mirilax type 5  He stopped taking mirilax 2 days ago, and did an enema a few hours ago  No pain with BMs  Since surgery he has been feeling like he has some vocal cord irritation/ hard time getting words out/ a catch in his breath and he runs out of air  Will be talking to ENT about this  Goals  STG to be completed in 8 weeks from 9/22/2022:  1  Marcellus Richard will be independent with initial home exercise program    2  Marcellus Richard will demonstrate ability to contract, relax and actively lengthen PFM  3  Marcellus Richard will report 50% improvement in UI    4  Marcellus Richard will report pain no greater than 2/10 with sitting with good posture  5  Marcellus Richard will report wearing 5 or less pads in a day  10  Marcellus Richard will report voiding every 2-4 hours  LTG to be completed in 12 weeks from 9/22/2022 or upon discharge from PFPT:  1  Easton will be independent with advanced home exercise program for self-management of symptoms  2  Marcellus Richard will demonstrate ability to appropriately activate deep core muscles with functional mobility tasks  3  Easton report 90% improvement or better in UI    4  Marcellus Richard will be able to wear no pad or liner to decrease risk of urinary tract infection  5  Marcellus Richard will report pain no greater than 0/10 with sitting for 60 min or more  Objective: See treatment diary below      Assessment: Christian Rowdy tolerated today's treatment session well  Patient education provided on [facility] education options:[id number]}  [place]on completed all TE with good form and no adverse reactions  ***  Easton continues to benefit from skilled OPPT services to address [id number]}  Will continue to address functional deficits and focus on progression of POC per patient tolerance  Plan: Continue per plan of care  Progress treatment as tolerated         Precautions: standard, post-op     Easton’s To-Do:  ·       Go to the bathroom and try to urinate every 2 hours during the day  o  No pushing! Take a deep breath and relax   ·       Kegels: 5 times a day (sitting)  o  10 second hold   o  5 times  o  10 quick flicks   o  NO BREATH HOLDING! ·       Increase fluids! Gradually more water around 5-6 bottles a day   ·       Diaphragmatic Breathing- belly breath in through the nose, out through the mouth like you are blowing out candles (3 min)  ·       Perineal massage with golf ball - 1-2 times a day no more than 5-10 min   ·       Scar massage to abdominal scars     Access Code: [email or web address]  URL: [email or web address]  Date: 09/29/2022  Prepared by: Mary Nino    Exercises  Chest Press with Core Activation - 1 x daily - 2 sets - 10 reps  Seated Pelvic Floor Contraction - 1 x daily - 3 sets - 10 reps  Seated Pelvic Floor Contraction with Isometric Hip Adduction - 1 x daily - 1 sets - 10 reps  Sit to Stand with Core activation - 1 x daily - 1 sets - 10 reps  Bridge with Core Activation - 1 x daily - 1 sets - 10-20 reps  Step Tap with Pelvic Floor Contraction - 1 x daily - 3 sets - 10 reps  Diaphragmatic Breathing - 1 x daily    10/25/2022   [place]on To Do:   · Pelvic contraction in standing    · Practice breathing- put hands on ribs and feel them expand, breathe “down” into the abdomen and pelvis      · Really focus on sit to stand with pressure management (exhale and pelvic contraction as you stand)   · Urge deferral (when you stand up and the urge hits)     ?  Take a couple deep breaths and relax   ? Do 5 pelvic floor contractions    ? Do some heel raises and wait for the urge to calm down      · When you feel like you are holding tension- take a deep breath and as you exhale let all the tension out of your body   · Remember the relax is just as important as the contract           9/22/2022  9/29/2022 10/6/2022 10/13/2022 10/25/2022 11/8/2022  11/28/2022    Patient Ed           PFM anatomy and function [facility]          Bladder health and habits  [facility]          Bladder retraining  [address]          Scar massage  [address]          Water intake [address]          Urge deferral  [address]   reviewed       Bed mobility and pressure management   KH                                          Neuro Re-Ed           DB with PFM  DB focus on 360* breath exhale 2x inhale, 2 min with PFM   Reviewed breathing mechanics      STS with PFM  x10  Single leg with 4" box x10 each LE  From chair with focus on exhale with work x15 Single leg STS from high low mat x10 each LE     BF           Bridge with PFM  x5         Pelvic elevator   2 min practice    Completed   100-50-0 x5  -0 x5  534-74-3-basement x5      Step up with PFM contraction   Step tap 4" 1 min  Step up 6" box 1 min forward, 1 min step up and over lateral        Push/pull with PFM at reformer   1 min each        kettel cardenas squat    10# x10 narrow SALENA, x10 wide SALENA Squat with PFM into a heel raise breath x15       Heel raises with PFM contraction    x20   x20     PNF lift with med ball     30" each side       Ladder drills     KH       Reformer hip abd in tall kneel    1' each direction        Side stepping over cones with PFM      x6 cones, step up and over and with taps      Running man       Modified x10 each LE                           Ther Ex           Warm-up   TM 3% incline 8 min  TM 10 min  TM 8 min  TM 8 min  TM 8 min      Seated PFM contraction with hip add  5" hold 5" rest x2 min          Lateral walk with PFM contraction    Red band x2 laps  Red band x3 laps   GTB 1 lap     PFM contraction with staggered stance     5" hold x10 alternating LEs 7" hold x10 alternating LEs     Bicep curl with OHP (PFM and exhale on OHP)     5# x15 8# x15      Standing hip abd with PFM on lift with exhale      1 min each LE x20 each      Lateral walk and tandem walk on BB (anticipatory PFM control)     4 laps each  Tandem walk on floor 2 laps      Quadruped alt UE with PFM and core activation       1 min                                       Ther Activity Manual           MFR PFM                                                        Modalities

## 2022-11-28 ENCOUNTER — OFFICE VISIT (OUTPATIENT)
Dept: PHYSICAL THERAPY | Facility: CLINIC | Age: 62
End: 2022-11-28

## 2022-11-28 ENCOUNTER — APPOINTMENT (OUTPATIENT)
Dept: PHYSICAL THERAPY | Facility: CLINIC | Age: 62
End: 2022-11-28

## 2022-11-28 DIAGNOSIS — N39.45 CONTINUOUS LEAKAGE OF URINE: Primary | ICD-10-CM

## 2022-11-28 DIAGNOSIS — R27.9 UNSPECIFIED LACK OF COORDINATION: ICD-10-CM

## 2022-11-28 DIAGNOSIS — M62.89 PELVIC FLOOR WEAKNESS, MALE: ICD-10-CM

## 2022-11-28 NOTE — PROGRESS NOTES
Daily Note     Today's date: 2022  Patient name: Dorothy Cohn  : 1960  MRN: [id number]  Referring provider: Folry Olivas, *  Dx:   Encounter Diagnosis     ICD-10-CM    1  Continuous leakage of urine  N39 45       2  Pelvic floor weakness, male  M62 89       3  Unspecified lack of coordination  R27 9           Start Time: 1530  Stop Time: 1615  Total time in clinic (min): 45 minutes    Subjective: Pt reports that most morning he is waking up dry  When he is sitting he is able to hold urine  When he is up and walking still leaking  He is on this 3rd brief today but he was on his feel just about all week  PSA is nearly undetectable  During the day he will go to toilet and attempt to void and empty 20-25cc  When sitting and watching show then going to bathroom he was able to void a significant amount  He is not leaking on the way to the bathroom- but once he gets to the toilet he will start to leak  Feels urge is fairly strong but leakage is not associated with really strong urge  History of Present Illness:   Dorothy Cohn is a 60 y o  male who presents to OPPT with primary complaint of post-op prostatectomy  They are referred to OPPT by Dr Juan F Choudhary reports he had radical prostatectomy 2022 with Helga Chun with bladder neck reconstruction  He was supposed to be d/c the following AM and was kept and extra day due to leaking urine around the malave cathter  Came home 2022 and was readmitted the next  he lot appetite and  sat down and was shaking and had 103 fever  Was admitted to hospital for 5 days  Last day in the hospital was the   Pulled the catheter   He was grossly incontinent of urine  He immediately started doing kegals  Was unable to do the sit to stand and difficulty with pelvic contractions due to perineal pain    He has been doing kegals 3-4 times a day and found a video online that was slightly different but he has stopped doing those  About a week and a half ago he noticed he gets a feeling of bladder feeling full when he is lying flat  But as soon as he rolls out of bed and gets to toilet and pulls down brief and then has to pinch off and then will have a urine stream   2 times in the last 10 days he woke up in the AM and felt he somewhat had an erection but is not sure because he also had a wet diaper  He no longer has a lifting restriction  He had another UTI start on this past Monday with fever and had urinalysis  Currently on antibiotics  States his bowel function has returned to normal pretty quickly  Will wipe and it is clean but feels like there is still something in there  When he doesn't take mirilax type 2-3  When he takes mirilax type 5  He stopped taking mirilax 2 days ago, and did an enema a few hours ago  No pain with BMs  Since surgery he has been feeling like he has some vocal cord irritation/ hard time getting words out/ a catch in his breath and he runs out of air  Will be talking to ENT about this  Goals  STG to be completed in 8 weeks from 9/22/2022:  1  Lianne Rainey will be independent with initial home exercise program    2  Lianne Rainey will demonstrate ability to contract, relax and actively lengthen PFM  3  Lianne Rainey will report 50% improvement in UI    4  Lianne Rainey will report pain no greater than 2/10 with sitting with good posture  5  Lianne Rainey will report wearing 5 or less pads in a day  10  Lianne Rainey will report voiding every 2-4 hours  LTG to be completed in 12 weeks from 9/22/2022 or upon discharge from PFPT:  1  [place]on will be independent with advanced home exercise program for self-management of symptoms  2  Lianne Rainey will demonstrate ability to appropriately activate deep core muscles with functional mobility tasks  3  [place]on report 90% improvement or better in UI    4  Lianne Rainey will be able to wear no pad or liner to decrease risk of urinary tract infection     5  Lianne Rainey will report pain no greater than 0/10 with sitting for 60 min or more  Objective: See treatment diary below      Assessment: Renetta El tolerated today's treatment session well  Patient education provided on POC and potential d/c   [place] completed all TE with good form and no adverse reactions  Pt reports improvements in UI and improvements in ability to hold urine  He still has significant UI when on his feet for long periods of time  Recommend discussing pacey cuff with urologist    Rajni Grijalva continues to benefit from skilled OPPT services to address urinary incontinence   Will continue to address functional deficits and focus on progression of POC per patient tolerance  Plan: Continue per plan of care  Progress treatment as tolerated  Precautions: standard, post-op     Easton’s To-Do:  ·       Go to the bathroom and try to urinate every 2 hours during the day  o  No pushing! Take a deep breath and relax   ·       Kegels: 5 times a day (sitting)  o  10 second hold   o  5 times  o  10 quick flicks   o  NO BREATH HOLDING! ·       Increase fluids! Gradually more water around 5-6 bottles a day   ·       Diaphragmatic Breathing- belly breath in through the nose, out through the mouth like you are blowing out candles (3 min)  ·       Perineal massage with golf ball - 1-2 times a day no more than 5-10 min   ·       Scar massage to abdominal scars     Access Code: [email or web address]  URL: [email or web address]  Date: 09/29/2022  Prepared by: Kev Fuentes    Exercises  Chest Press with Core Activation - 1 x daily - 2 sets - 10 reps  Seated Pelvic Floor Contraction - 1 x daily - 3 sets - 10 reps  Seated Pelvic Floor Contraction with Isometric Hip Adduction - 1 x daily - 1 sets - 10 reps  Sit to Stand with Core activation - 1 x daily - 1 sets - 10 reps  Bridge with Core Activation - 1 x daily - 1 sets - 10-20 reps  Step Tap with Pelvic Floor Contraction - 1 x daily - 3 sets - 10 reps  Diaphragmatic Breathing - 1 x daily    10/25/2022   [place] To Do:   · Pelvic contraction in standing    · Practice breathing- put hands on ribs and feel them expand, breathe “down” into the abdomen and pelvis      · Really focus on sit to stand with pressure management (exhale and pelvic contraction as you stand)   · Urge deferral (when you stand up and the urge hits)     ?  Take a couple deep breaths and relax   ? Do 5 pelvic floor contractions    ? Do some heel raises and wait for the urge to calm down      · When you feel like you are holding tension- take a deep breath and as you exhale let all the tension out of your body   · Remember the relax is just as important as the contract           9/22/2022  9/29/2022 10/6/2022 10/13/2022 10/25/2022 11/8/2022  11/28/2022    Patient Ed           PFM anatomy and function [address] [facility]          Bladder health and habits  [address] [facility]          Bladder retraining  [address] [facility]          Scar massage  [address] [facility]          Water intake [address] [facility]          Urge deferral  [address] [facility]   reviewed       Bed mobility and pressure management                                             Neuro Re-Ed           DB with PFM  DB focus on 360* breath exhale 2x inhale, 2 min with PFM   Reviewed breathing mechanics      STS with PFM  x10  Single leg with 4" box x10 each LE  From chair with focus on exhale with work x15 Single leg STS from high low mat x10 each LE     BF           Bridge with PFM  x5         Pelvic elevator   2 min practice    Completed   100-50-0 x5  -0 x5  186-80-1-basement x5      Step up with PFM contraction   Step tap 4" 1 min  Step up 6" box 1 min forward, 1 min step up and over lateral        Push/pull with PFM at reformer   1 min each        kettel cardenas squat    10# x10 narrow SALENA, x10 wide SALENA Squat with PFM into a heel raise breath x15       Heel raises with PFM contraction    x20   x20     PNF lift with med ball     30" each side       Ladder drills     [facility] hip abd in tall kneel    1' each direction        Side stepping over cones with PFM      x6 cones, step up and over and with taps      Running man       Modified x10 each LE                           Ther Ex           Warm-up   TM 3% incline 8 min  TM 10 min  TM 8 min  TM 8 min  TM 8 min  TM 8 min     Seated PFM contraction with hip add  5" hold 5" rest x2 min          Lateral walk with PFM contraction    Red band x2 laps  Red band x3 laps   GTB 1 lap     PFM contraction with staggered stance     5" hold x10 alternating LEs 7" hold x10 alternating LEs     Bicep curl with OHP (PFM and exhale on OHP)     5# x15 8# x15      Standing hip abd with PFM on lift with exhale      1 min each LE x20 each      Lateral walk and tandem walk on BB (anticipatory PFM control)     4 laps each  Tandem walk on floor 2 laps      Quadruped alt UE with PFM and core activation       1 min                                       Ther Activity                                 Manual           MFR PFM                                                        Modalities

## 2022-11-30 ENCOUNTER — OFFICE VISIT (OUTPATIENT)
Dept: UROLOGY | Facility: CLINIC | Age: 62
End: 2022-11-30

## 2022-11-30 VITALS
SYSTOLIC BLOOD PRESSURE: 130 MMHG | BODY MASS INDEX: 34.96 KG/M2 | HEIGHT: 69 IN | DIASTOLIC BLOOD PRESSURE: 72 MMHG | WEIGHT: 236 LBS | HEART RATE: 80 BPM

## 2022-11-30 DIAGNOSIS — C61 PROSTATE CANCER (HCC): Primary | ICD-10-CM

## 2022-11-30 NOTE — PROGRESS NOTES
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  Now s/p prostate biopsy  Still having some hematospermia  JORGE 13    Patient ultimately agreed to proceed to the operating room on 8/11/2022  Robotic prostatectomy performed  Patient had a difficult apical dissection and anastomosis which required bladder neck reconstruction on the left side  Hospital stay was uncomplicated  Patient did note some pericatheter urethral leak with spasms  Unfortunately, he was readmitted August 21st with fevers and a Klebsiella UTI  Antibiotic therapy was administered, inpatient cystogram performed which was negative, catheter removed  Patient has had some significant challenges with his early recovery  Incontinence was significantly difficult for the patient to deal with  Patient began to have some improvement 3 weeks ago and his mental status has greatly improved after some prostate cancer support groups that have helped to reassured the patient about his early recovery  He is now dry overnight and although still wearing pads, able to hold some urine to be able to void into the toilet  He has returned to physical therapy and recently been released  Is back to work  Patient has not had any return of erectile function  He is using the daily Cialis and vacuum erection devices twice a day for rehabilitation      PSA, [facility]  11/11/2022  Component      PSA, Total   Prostate Specific Antigen - NEW Component 11/11/2022 01/29/2022 09/17/2021 12/30/2020 03/09/2020             PSA, Total <0 01 2 61 2 15 2 53 1 79 Load older lab results   Prostate Specific Antigen - NEW <0 01 -- --          Past Medical History:     Past Medical History:   Diagnosis Date   • Anxiety    • Cancer ([facility] [address] ) 6/15/22    Prostatic   • GERD (gastroesophageal reflux disease) 2001   • HL (hearing loss)     B/L   • Hyperlipidemia    • Hypertension    • Obesity    • Prostate cancer ([facility] [address] )        PAST SURGICAL HISTORY:     Past Surgical History:   Procedure Laterality Date   • COLONOSCOPY     • CT CYSTOGRAM  8/24/2022   • OK BIOPSY OF PROSTATE,NEEDLE,TRANSPERINEAL N/A 06/08/2022    Procedure: TRANSPERINEAL MRI FUSION BIOPSY PROSTATE;  Surgeon: Sancho Joy MD;  Location: [facility] MAIN OR;  Service: Urology   • OK LAP,PROSTATECTOMY,RADICAL,W/NERVE SPARE,INCL ROBOTIC N/A 8/11/2022    Procedure: ROBOTIC PROSTATECTOMY RADICAL , BLADDER NECK RECONSTRUCTION;  Surgeon: Adriana Khoury MD;  Location: [facility] Main OR;  Service: Urology   • VARICOSE VEIN SURGERY     • VASECTOMY     • WISDOM TOOTH EXTRACTION         CURRENT MEDICATIONS:     Current Outpatient Medications   Medication Sig Dispense Refill   • aspirin (ECOTRIN LOW STRENGTH) 81 mg EC tablet Take 81 mg by mouth daily     • irbesartan (AVAPRO) 75 mg tablet Take 1 tablet (75 mg total) by mouth daily 90 tablet 3   • rosuvastatin (CRESTOR) 5 mg tablet Take 1 tablet (5 mg total) by mouth every other day 90 tablet 3   • tadalafil (CIALIS) 5 MG tablet Take 1 tablet (5 mg total) by mouth in the morning 90 tablet 3   • docusate sodium (COLACE) 100 mg capsule Take 1 capsule (100 mg total) by mouth 2 (two) times a day (Patient not taking: Reported on 11/30/2022) 30 capsule 0     No current facility-administered medications for this visit         ALLERGIES:   No Known Allergies    SOCIAL HISTORY:     Social History     Socioeconomic History   • Marital status: /Civil Union     Spouse name: None • Number of children: None   • Years of education: None   • Highest education level: None   Occupational History   • None   Tobacco Use   • Smoking status: Never   • Smokeless tobacco: Never   Vaping Use   • Vaping Use: Never used   Substance and Sexual Activity   • Alcohol use: Not Currently     Alcohol/week: 2 0 standard drinks     Types: 2 Standard drinks or equivalent per week     Comment: 1 x month   • Drug use: Not Currently     Types: Marijuana     Comment: College   • Sexual activity: Yes     Partners: Female     Birth control/protection: Male Sterilization   Other Topics Concern   • None   Social History Narrative   • None     Social Determinants of Health     Financial Resource Strain: Not on file   Food Insecurity: No Food Insecurity   • Worried About Running Out of Food in the Last Year: Never true   • Ran Out of Food in the Last Year: Never true   Transportation Needs: No Transportation Needs   • Lack of Transportation (Medical): No   • Lack of Transportation (Non-Medical): No   Physical Activity: Not on file   Stress: Not on file   Social Connections: Not on file   Intimate Partner Violence: Not on file   Housing Stability: Low Risk    • Unable to Pay for Housing in the Last Year: No   • Number of Places Lived in the Last Year: 1   • Unstable Housing in the Last Year: No       SOCIAL HISTORY:     Family History   Problem Relation Age of Onset   • Sickle cell trait Mother    • Hypertension Mother    • Stroke Mother            • Hypertension Father    • Heart disease Father    • Cardiomyopathy Brother    • Sickle cell trait Maternal Grandmother    • Sickle cell trait Maternal Grandfather    • Hypertension Paternal Grandfather        REVIEW OF SYSTEMS:     Review of Systems   Constitutional: Negative  Respiratory: Negative  Cardiovascular: Negative  Gastrointestinal: Negative  Genitourinary: Positive for enuresis (Improving)  Negative for scrotal swelling     Musculoskeletal: Negative  Skin: Negative  Psychiatric/Behavioral: Negative  PHYSICAL EXAM:     /72 (BP Location: Right arm, Patient Position: Sitting, Cuff Size: Adult)   Pulse 80   Ht 5' 9" (1 753 m)   Wt 107 kg (236 lb)   BMI 34 85 kg/m²     General:  Healthy appearing male in no acute distress  They have a normal affect  There is not appear to be any gross neurologic defects or abnormalities  HEENT:  Normocephalic, atraumatic  Neck is supple without any palpable lymphadenopathy  Cardiovascular:  Patient has normal palpable distal radial pulses  There is no significant peripheral edema  No JVD is noted  Respiratory:  Patient has unlabored respirations  There is no audible wheeze or rhonchi  Abdomen:  Abdomen is with healing robotic surgical scars  Abdomen is soft and nontender  There is no tympany  Inguinal and umbilical hernia are not appreciated  Musculoskeletal:  Patient does not have significant CVA tenderness in the  flank with palpation or percussion  They full range of motion in all 4 extremities  Strength in all 4 extremities appears congruent  Patient is able to ambulate without assistance or difficulty  Dermatologic:  Patient has no skin abnormalities or rashes  LABS:     CBC:   Lab Results   Component Value Date    WBC 9 82 2022    HGB 13 0 2022    HCT 38 8 2022    MCV 89 2022     2022       BMP:   Lab Results   Component Value Date    CALCIUM 9 1 2022    K 4 2 2022    CO2 23 2022     2022    BUN 22 2022    CREATININE 0 96 2022         IMAGIN/13/22  MULTIPARAMETRIC MRI OF THE PROSTATE WITH AND WITHOUT CONTRAST-WITH 3-D POSTPROCESSING      INDICATION:   R97 20: Elevated prostate specific antigen (PSA)  N40 3: Nodular prostate with lower urinary tract symptoms  N13 8: Other obstructive and reflux uropathy      COMPARISON: None     PSA LEVEL: 2 6 ng/ml  2022    PRIOR BIOPSY DATE: Unknown  BIOPSY RESULTS: Unknown      TECHNIQUE: The following pulse sequences were obtained:  Small field-of-view axial T1-weighted and multiplanar T2-weighted images; DWI axial and ADC map; large field of view axial T2 weighted images; T1w in-phase and opposed-phase axials of entire pelvis   and dynamic 3D T1w axial before and during IV contrast injection  Imaging performed on 3 0T MRI      CONTRAST:  Gadobutrol (Gadavist) 9 mL of Gadobutrol injection (SINGLE-DOSE)     TECHNICAL LIMITATIONS: None      FINDINGS:     PROSTATE:     Size: 5 1 x 4 4 x 4 6 cm = 49 7 cc  Post-biopsy hemorrhage:  None  Central gland enlargement (BPH): Mild      Focal lesions -      Lesion: 1       Size: 1 2 x 0 8 x 0 9 cm, 0 49 cc  Location: Right posterior medial peripheral zone at the apex       T2-weighted images: Score 3: Heterogeneous signal or noncircumscribed, rounded, moderate hypointensity; includes others that do not qualify as 2, 4 or 5  Diffusion-weighted images: Score 4: Focal markedly hypointense on ADC and markedly hyperintense on high b-value DWI; less than 1 5 cm in greatest dimension  Dynamic post-contrast images: (-) Lesion that does not enhance early compared to the surrounding prostate or enhances diffusely so that the margins of the enhancing area do not correspond to a finding on T2-weighted images and/or DWI  PI-RADS Assessment Category: 4, High (clinically significant cancer is likely to be present)  Extra-prostatic extension (EPE): Abuts capsule without visualized EPE      SEMINAL VESICLES: Unremarkable     Note: Clinically significant cancer is defined on pathology/histology as [place] score greater than or equal to 7, and/or volume of greater than or equal to 0 5 mL, and/or extraprostatic extension      URINARY BLADDER: Unremarkable      LYMPH NODES: No pelvic lymphadenopathy      BONES: No suspicious osseous lesion      There are bilateral fat-containing inguinal hernias    There is diverticulosis coli      IMPRESSION:     1  PI-RADSv2 1 Category 4 -High (clinically significant cancer is likely to be present)  Right posterior medial peripheral zone at the apex      2  No extraprostatic tumor, seminal vesicle invasion, pelvic lymphadenopathy, or pelvic osseous metastatic disease      3  Calculated prostate volume of 49 7 cc  PATHOLOGY:     6/8/22  Final Diagnosis   A  Prostate, RIGHT ANTERIOR MEDIAL needle biopsy:    -Benign prostatic tissue          B  Prostate, RIGHT BASE, needle Biopsy:   - Prostatic adenocarcinoma, confirmed by triple stain   -[place] grade 3 +4 = score  7  -Percentage of Grade 4:  10%  -Tumor involves two submitted cores  -Tumor  discontinuously involves approximately 10% , 80% of  each core biopsy  - Perineural invasion: Not identified        C  Prostate, RIGHT POSTERIOR MEDIAL, needle biopsy:    -Benign prostatic tissue          D  Prostate, LEFT BASE, needle biopsy:    -Prostate tissue with small focus of atypical glands          E  Prostate, LEFT POSTERIOR MEDIAL, needle biopsy:    -Benign prostatic tissue  Note  Triple stain shows positive staining of myoepithelial cell layer by , P63  Racemase stain is negative  Controls reacted appropriately      F  Prostate, LEFT POSTERIOR LATERAL, needle biopsy:    -Prostate tissue with small focus of atypical glands  Note  Triple stain shows focal absent staining of myoepithelial cell layer by , P63  Racemase stain is negative  Controls reacted appropriately      G  Prostate, LEFT ANTERIOR LATERAL, needle biopsy:    -Benign prostatic tissue          H  Prostate, LEFT ANTERIOR MEDIAL,  needle biopsy:    -Benign prostatic tissue          I   Prostate, RIGHT POSTERIOR LATERAL, needle Biopsy:   - Prostatic adenocarcinoma, confirmed by triple stain   -[place] grade 3 + 3 = score  6  -Tumor involves one of two submitted cores  -Tumor involves approximately 10% of  core biopsy  - Perineural invasion: Not identified        J  Prostate, RIGHT ANTERIOR LATERAL,  needle Biopsy:   - Prostatic adenocarcinoma, confirmed by triple stain   -[place] grade 3 +4 = score  7  -Percentage of Grade 4:  10%  -Tumor involves two submitted cores  -Tumor involves approximately 20% , 20% of  each core biopsy  - Perineural invasion: Not identified        K  Prostate, [place] of [place],  needle Biopsy:   - Prostatic adenocarcinoma, confirmed by triple stain   -[place] grade 3 +3 = score  6  -Tumor involves four of five submitted cores  -Tumor involves approximately 35% , 50%, 60%, 75%  of  each core biopsy  - Perineural invasion: Not identified     NOMOGRAM:       ASSESSMENT:     8/11/22  Final Diagnosis   A  Soft Tissue, cely-prostatic fat:  - Benign fibroadipose tissue   - Negative for carcinoma      B  Prostate, prostate and seminal vesicles:  - Prostatic adenocarcinoma, [place] score 7 (3+4), grade group 2   - Lymphovascular invasion is not identified  - Extraprostatic extension is not identified  - Inked margins, negative for carcinoma  - Seminal vesicles, negative for carcinoma  - Please see note and synoptic report  ASSESSMENT:     60 y o  male with [id number] [place] 3+4=7 (negative SM) s/p RALP 8/11/22    PLAN:     PSA is reassuring  Recheck in 3 months time  PSA every 3 months for year 1  Incontinence is starting to improve  I am hopeful we see dramatic improvement over the next 3 months at the 6 month visit  Patient understands there are surgical options for repair if he does not recover however he reports at this visit that if he were to remain at his current state for the rest of his life, he would not consider any further surgical intervention  This is somewhat reassuring that the patient is finally starting to improve  Patient has not had any erectile function return  Will continue daily Cialis and vacuum erection devices  Can certainly add on demand PDE5 inhibitors once the patient is continent  Discussion of intracavernosal injections reviewed as well

## 2022-12-01 ENCOUNTER — TELEPHONE (OUTPATIENT)
Dept: FAMILY MEDICINE CLINIC | Facility: CLINIC | Age: 62
End: 2022-12-01

## 2022-12-01 DIAGNOSIS — I10 HYPERTENSION, UNSPECIFIED TYPE: ICD-10-CM

## 2022-12-01 RX ORDER — IRBESARTAN 150 MG/1
150 TABLET ORAL
Qty: 90 TABLET | Refills: 3 | Status: SHIPPED | OUTPATIENT
Start: 2022-12-01

## 2022-12-01 RX ORDER — IRBESARTAN 75 MG/1
75 TABLET ORAL DAILY
Qty: 90 TABLET | Refills: 3 | Status: SHIPPED | OUTPATIENT
Start: 2022-12-01

## 2022-12-01 NOTE — TELEPHONE ENCOUNTER
Wife Kaylie Harrsi called RE:  cirbesartan (AVAPRO) 75 mg tablet direction say 1 daily but he takes a total of 225mg daily 1 75mg and 1 150mg  Please advise insurance won't refill  Do you want to call in 75mg plus 150mg?     [facility]

## 2022-12-12 DIAGNOSIS — I10 HYPERTENSION, UNSPECIFIED TYPE: ICD-10-CM

## 2022-12-12 RX ORDER — IRBESARTAN 75 MG/1
75 TABLET ORAL DAILY
Qty: 90 TABLET | Refills: 3 | Status: SHIPPED | OUTPATIENT
Start: 2022-12-12

## 2022-12-12 RX ORDER — IRBESARTAN 150 MG/1
150 TABLET ORAL
Qty: 90 TABLET | Refills: 3 | Status: SHIPPED | OUTPATIENT
Start: 2022-12-12

## 2022-12-12 NOTE — TELEPHONE ENCOUNTER
Received a call from the patient's wife stating Jamil Olmos is almost out of his Irbesartan  She has [facility], but [facility] is not showing  She was asking us to have the prescription refilled at [facility], this time      Pharmacy: [facility] in [place]

## 2022-12-16 ENCOUNTER — TELEPHONE (OUTPATIENT)
Dept: FAMILY MEDICINE CLINIC | Facility: CLINIC | Age: 62
End: 2022-12-16

## 2022-12-16 NOTE — TELEPHONE ENCOUNTER
Received a call from the patient's wife asking if you can enter any labs that you need completed so Milad Samayoa can get them done before his 12/27/22 appointment

## 2022-12-20 ENCOUNTER — RA CDI HCC (OUTPATIENT)
Dept: OTHER | Facility: HOSPITAL | Age: 62
End: 2022-12-20

## 2022-12-20 NOTE — PROGRESS NOTES
[facility]       Chart reviewed, no opportunity found: CHART REVIEWED, NO OPPORTUNITY FOUND        Patients Insurance        Commercial Insurance: [address] [place] [address]

## 2022-12-23 ENCOUNTER — TELEPHONE (OUTPATIENT)
Dept: UROLOGY | Facility: AMBULATORY SURGERY CENTER | Age: 62
End: 2022-12-23

## 2022-12-23 NOTE — TELEPHONE ENCOUNTER
Pt under care of Dr Henrique Joy called and stated he received letter in mail regarding appt being r/s and pt is asking if he could have an afternoon appt   Pt will keep existing appt for now but would like an afternoon appt if possible     Pt call [phone]

## 2022-12-29 ENCOUNTER — OFFICE VISIT (OUTPATIENT)
Dept: FAMILY MEDICINE CLINIC | Facility: CLINIC | Age: 62
End: 2022-12-29

## 2022-12-29 VITALS
DIASTOLIC BLOOD PRESSURE: 72 MMHG | BODY MASS INDEX: 35.52 KG/M2 | TEMPERATURE: 97.6 F | HEIGHT: 69 IN | OXYGEN SATURATION: 94 % | SYSTOLIC BLOOD PRESSURE: 127 MMHG | HEART RATE: 83 BPM | WEIGHT: 239.8 LBS

## 2022-12-29 DIAGNOSIS — E78.00 PURE HYPERCHOLESTEROLEMIA: ICD-10-CM

## 2022-12-29 DIAGNOSIS — I10 PRIMARY HYPERTENSION: ICD-10-CM

## 2022-12-29 DIAGNOSIS — Z12.12 SCREENING FOR COLORECTAL CANCER: Primary | ICD-10-CM

## 2022-12-29 DIAGNOSIS — Z12.11 SCREENING FOR COLORECTAL CANCER: Primary | ICD-10-CM

## 2022-12-29 DIAGNOSIS — C61 PROSTATE CANCER (HCC): ICD-10-CM

## 2022-12-29 NOTE — PROGRESS NOTES
Assessment/Plan: Continue follow-up with urology  Continue current treatment  Recommend lab testing  Anticipatory guidance provided  1  Screening for colorectal cancer    2  Prostate cancer ([facility] [address] )    3  Primary hypertension  -     CBC and differential  -     Comprehensive metabolic panel  -     Lipid Panel with Direct LDL reflex    4  Pure hypercholesterolemia  -     CBC and differential  -     Comprehensive metabolic panel  -     Lipid Panel with Direct LDL reflex          Subjective:      Patient ID: Ajay Villa is a 60 y o  male  Patient seen for recheck on chronic conditions  He continues to follow with urology regarding history of prostate cancer  He is gradually healing and gradually improving  Urinary incontinence is getting better  He still has some issues of erectile dysfunction  The following portions of the patient's history were reviewed and updated as appropriate: allergies, current medications, past family history, past medical history, past social history, past surgical history, and problem list     Review of Systems   Constitutional: Negative  HENT: Negative  Eyes: Negative  Respiratory: Negative  Cardiovascular: Negative  Gastrointestinal: Negative  Endocrine: Negative  Genitourinary: Negative  As noted in HPI   Musculoskeletal: Negative  Skin: Negative  Allergic/Immunologic: Negative  Neurological: Negative  Hematological: Negative  Psychiatric/Behavioral: Negative            Objective:      /72 (BP Location: Left arm, Patient Position: Sitting, Cuff Size: Standard)   Pulse 83   Temp 97 6 °F (36 4 °C) (Tympanic)   Ht 5' 9" (1 753 m)   Wt 109 kg (239 lb 12 8 oz)   SpO2 94%   BMI 35 41 kg/m²          Physical Exam

## 2023-01-23 NOTE — OP NOTE
OPERATIVE REPORT  PATIENT NAME: Leelee Ansari    :  1960  MRN: [id number]  Pt [facility] 05    SURGERY DATE: 2022    Surgeon(s) and Role:     Anna Marie Alcantar MD - Primary    Preop Diagnosis:  Elevated prostate specific antigen (PSA) [R97 20]    Post-Op Diagnosis Codes:     * Elevated prostate specific antigen (PSA) [R97 20]    Procedure(s) (LRB):  TRANSPERINEAL MRI FUSION BIOPSY PROSTATE (N/A)   SATURATION BIOPSY, 25 CORES    Specimen(s):  ID Type Source Tests Collected by Time Destination   1 : RIGHT ANTERIOR MEDIAL Tissue Prostate TISSUE EXAM Sabiha Barbosa MD 2022 [id number]    2 : RIGHT BASE Tissue Prostate TISSUE EXAM Sabiha Barbosa MD 2022 4475    3 : RIGHT POSTERIOR MEDIAL Tissue Prostate TISSUE EXAM Sabiha Barbosa MD 2022 0936    4 : LEFT BASE Tissue Prostate TISSUE EXAM Sabiha Barbosa MD 2022 [id number]    5 : LEFT POSTERIOR MEDIAL Tissue Prostate TISSUE EXAM Sabiha Barbosa MD 2022 1631    6 : LEFT POSTERIOR LATERAL Tissue Prostate TISSUE EXAM Sabiha Barbosa MD 2022 2100    7 : LEFT ANTERIOR LATERAL Tissue Prostate TISSUE EXAM Sabiha Barbosa MD 2022 6927    8 : LEFT ANTERIOR MEDIAL Tissue Prostate TISSUE EXAM Sabiha Barbosa MD 2022 0936    9 : RIGHT POSTERIOR LATERAL Tissue Prostate TISSUE EXAM Sabiha Barbosa MD 2022 [id number]    10 : RIGHT ANTERIOR LATERAL Tissue Prostate TISSUE EXAM Sabiha Barbosa MD 2022 0936    11 : Region of Interest Tissue Prostate TISSUE EXAM Sabiha Barbosa MD 2022 0944        Estimated Blood Loss:   Minimal    Drains:  * No LDAs found *    Anesthesia Type:   IV Sedation with Anesthesia    Operative Indications:  Elevated prostate specific antigen (PSA) [R97 20]  Prostate Nodule    Operative Findings:  SATURATION BIOPSY, 25 CORES    Complications:   None    Procedure and Technique:  Procedure was saturation transperineal biopsy utilizing the Precision Point perineal access device     57 y o  male with history of prostate nodule  He presents after multiparametric MRI was obtained of the prostate  There were lesions concerning so the patient was scheduled for transperineal fusion biopsy  He was pretreated with antibiotics  He was met in the prep and hold area and the procedure along with its risks and benefits were discussed and reviewed  Patient signed an informed consent  Transferred to OR  He was placed in dorsal lithotomy with care to pad all pressure points  His perineum and genitalia were prepped with a ChloraPrep solution  Sterile Iodoband was placed over the perineum above the rectum  Side fire biplanar transrectal ultrasound was performed and measurements of the prostate gland were taken as above  Biplanar transrectal ultrasound was placed in the patient's rectum  With the assistance of the technician, the prior obtained MRI images which had been form added for the abnormal lesions were mapped to the real-time ultrasound  In the midportion of the left and right prostate, a janel was denoted in the perineal skin  Skin wheal was elevated with 5 cc of 1% lidocaine plain  0 5% Marcaine on either side  Utilizing the [place] [employer] access device, the perineum was accessed via finder needle  Ultrasound guidance was utilized to place the needle into the lesion that was mass at the Right apex (ROI1)  Five specimens taken  We confirmed good position of the needle strikes utilizing the fusion software  On the right side of the prostate, we performed serial biopsies of the right posterior medial peripheral zone, right posterior lateral peripheral zone and moving up the anterior horn to the right anterior lateral and right anteromedial zone  Separate specimen was taken from the right-sided prostate base  Two cores taken from each  The PrecisionPoint device was repositioned into the left perineal stab  The biopsies were undertaken in the same fashion on the left side  Left posterior medial, left posterior lateral, left anterior lateral, left anteromedial and left base, 2 cores each  The transrectal ultrasound probe was removed  The Iodoband was retracted  Some gentle pressure was placed on the perineum for approximately 5 minutes  Direct pressure was held for 5 minutes for hemostasis  At the completion of the procedure, the patient was taken out of dorsal lithotomy, extubated and transferred to PACU in good condition  Overall the patient tolerated the procedure and there were no complications  Plan-the patient will return for biopsy pathology review in 2 weeks       I was present for the entire procedure    Patient Disposition:  PACU       SIGNATURE: Isabell West MD  DATE: June 8, 2022  TIME: 10:22 AM Patient Specific Counseling (Will Not Stick From Patient To Patient): Chronic paronychia not responding to topical antibiotics.\\nFavor likely related to chronic irritation/manipulation and disruption of nail folds. \\nRecommend pulsed fluconazole twice weekly for next 4 weeks. \\nRecommend topical clobetasol ointments to nail folds after 4 weeks of clobetasol. \\nRTC 4-6 weeks for continued follow up and management. Detail Level: Detailed Patient Specific Counseling (Will Not Stick From Patient To Patient): Favor small PG to right ring finger lateral nail fold due to chronic paronychia.\\nRecommend trial of topical timolol 2-3x daily until follow up.\\nWill monitor progress at follow up in 4-6 weeks.

## 2023-03-13 ENCOUNTER — TELEPHONE (OUTPATIENT)
Dept: UROLOGY | Facility: CLINIC | Age: 63
End: 2023-03-13

## 2023-03-16 ENCOUNTER — OFFICE VISIT (OUTPATIENT)
Dept: UROLOGY | Facility: CLINIC | Age: 63
End: 2023-03-16

## 2023-03-16 VITALS
BODY MASS INDEX: 35.7 KG/M2 | SYSTOLIC BLOOD PRESSURE: 124 MMHG | WEIGHT: 241 LBS | DIASTOLIC BLOOD PRESSURE: 78 MMHG | HEIGHT: 69 IN | HEART RATE: 70 BPM

## 2023-03-16 DIAGNOSIS — N52.31 ERECTILE DYSFUNCTION AFTER RADICAL PROSTATECTOMY: Primary | ICD-10-CM

## 2023-03-16 DIAGNOSIS — C61 PROSTATE CANCER (HCC): ICD-10-CM

## 2023-03-16 RX ORDER — SILDENAFIL CITRATE 20 MG/1
20 TABLET ORAL ONCE
Qty: 30 TABLET | Refills: 3 | Status: SHIPPED | OUTPATIENT
Start: 2023-03-16 | End: 2023-03-16

## 2023-03-16 NOTE — PROGRESS NOTES
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  Now s/p prostate biopsy  Still having some hematospermia  JOREG 13    Patient ultimately agreed to proceed to the operating room on 8/11/2022  Robotic prostatectomy performed  Patient had a difficult apical dissection and anastomosis which required bladder neck reconstruction on the left side  Hospital stay was uncomplicated  Patient did note some pericatheter urethral leak with spasms  Unfortunately, he was readmitted August 21st with fevers and a Klebsiella UTI  Antibiotic therapy was administered, inpatient cystogram performed which was negative, catheter removed  Patient has had some significant challenges with his early recovery  Incontinence was significantly difficult for the patient to deal with  Patient began to have some improvement 3 weeks ago and his mental status has greatly improved after some prostate cancer support groups that have helped to reassured the patient about his early recovery  He is now dry overnight and although still wearing pads, able to hold some urine to be able to void into the toilet  He has returned to physical therapy and recently been released  Is back to work  Patient has not had any return of erectile function  He is using the daily Cialis and vacuum erection devices twice a day for rehabilitation  Patient is now 7 months out from his prostatectomy  3/16/23 -it appears that his continence has somewhat plateaued  When he is at home on the weekends, he is able to wear only 1 pad with reasonable urinary control  At work, he often is wearing 3-4 pads per day  With daily Cialis and vacuum erection device, has not had any return of erectile function  Presents for the visit with his wife      2/28/23  1:30 PM     Prostate Specific Antigen <4 00 ng/mL <0 01         Past Medical History:     Past Medical History:   Diagnosis Date   • Anxiety    • Cancer ([address] ) 6/15/22    Prostatic   • GERD (gastroesophageal reflux disease) 2001   • HL (hearing loss)     B/L   • Hyperlipidemia    • Hypertension    • Obesity    • Prostate cancer ([address] )        PAST SURGICAL HISTORY:     Past Surgical History:   Procedure Laterality Date   • COLONOSCOPY     • CT CYSTOGRAM  8/24/2022   • ND BX PROSTATE STRTCTC SATURATION SAMPLING IMG GID N/A 06/08/2022    Procedure: TRANSPERINEAL MRI FUSION BIOPSY PROSTATE;  Surgeon: Isabella Person MD;  Location: [facility] OR;  Service: Urology   • ND LAPS SURG [id number] RPBIC RAD W/NRV SPARING ROBOT N/A 8/11/2022    Procedure: ROBOTIC PROSTATECTOMY RADICAL , BLADDER NECK RECONSTRUCTION;  Surgeon: Teressa Lopes MD;  Location: [facility];  Service: Urology   • VARICOSE VEIN SURGERY     • VASECTOMY     • WISDOM TOOTH EXTRACTION         CURRENT MEDICATIONS:     Current Outpatient Medications   Medication Sig Dispense Refill   • aspirin (ECOTRIN LOW STRENGTH) 81 mg EC tablet Take 81 mg by mouth daily     • irbesartan (AVAPRO) 150 mg tablet Take 1 tablet (150 mg total) by mouth daily at bedtime 90 tablet 3   • irbesartan (AVAPRO) 75 mg tablet Take 1 tablet (75 mg total) by mouth daily 90 tablet 3   • rosuvastatin (CRESTOR) 5 mg tablet Take 1 tablet (5 mg total) by mouth every other day 90 tablet 3   • sildenafil (REVATIO) 20 mg tablet Take 1 tablet (20 mg total) by mouth once for 1 dose On empty stomach, one hour before intercourse 30 tablet 3   • tadalafil (CIALIS) 5 MG tablet Take 1 tablet (5 mg total) by mouth in the morning 90 tablet 3   • docusate sodium (COLACE) 100 mg capsule Take 1 capsule (100 mg total) by mouth 2 (two) times a day (Patient not taking: Reported on 2022) 30 capsule 0     No current facility-administered medications for this visit  ALLERGIES:   No Known Allergies    SOCIAL HISTORY:     Social History     Socioeconomic History   • Marital status: /Civil Union     Spouse name: None   • Number of children: None   • Years of education: None   • Highest education level: None   Occupational History   • None   Tobacco Use   • Smoking status: Never   • Smokeless tobacco: Never   Vaping Use   • Vaping Use: Never used   Substance and Sexual Activity   • Alcohol use: Yes     Alcohol/week: 2 0 standard drinks     Types: 2 Standard drinks or equivalent per week     Comment: 1 x month   • Drug use: Not Currently     Comment: College   • Sexual activity: Yes     Partners: Female     Birth control/protection: Male Sterilization   Other Topics Concern   • None   Social History Narrative   • None     Social Determinants of Health     Financial Resource Strain: Not on file   Food Insecurity: No Food Insecurity   • Worried About Running Out of Food in the Last Year: Never true   • Ran Out of Food in the Last Year: Never true   Transportation Needs: No Transportation Needs   • Lack of Transportation (Medical): No   • Lack of Transportation (Non-Medical):  No   Physical Activity: Not on file   Stress: Not on file   Social Connections: Not on file   Intimate Partner Violence: Not on file   Housing Stability: Low Risk    • Unable to Pay for Housing in the Last Year: No   • Number of Places Lived in the Last Year: 1   • Unstable Housing in the Last Year: No       SOCIAL HISTORY:     Family History   Problem Relation Age of Onset   • Sickle cell trait Mother    • Hypertension Mother    • Stroke Mother            • Hypertension Father    • Heart disease Father    • Cardiomyopathy Brother    • Sickle cell trait Maternal Grandmother    • Sickle cell trait Maternal Grandfather    • Hypertension Paternal Grandfather        REVIEW OF SYSTEMS:     Review of Systems   Constitutional: Negative  Respiratory: Negative  Cardiovascular: Negative  Gastrointestinal: Negative  Genitourinary: Positive for enuresis (Improving)  Negative for scrotal swelling  Musculoskeletal: Negative  Skin: Negative  Psychiatric/Behavioral: Negative  PHYSICAL EXAM:     /78 (BP Location: Right arm, Patient Position: Sitting, Cuff Size: Adult)   Pulse 70   Ht 5' 9" (1 753 m)   Wt 109 kg (241 lb)   BMI 35 59 kg/m²     General:  Healthy appearing male in no acute distress  They have a normal affect  There is not appear to be any gross neurologic defects or abnormalities  HEENT:  Normocephalic, atraumatic  Neck is supple without any palpable lymphadenopathy  Cardiovascular:  Patient has normal palpable distal radial pulses  There is no significant peripheral edema  No JVD is noted  Respiratory:  Patient has unlabored respirations  There is no audible wheeze or rhonchi  Abdomen:  Abdomen is with healing robotic surgical scars  Abdomen is soft and nontender  There is no tympany  Inguinal and umbilical hernia are not appreciated  Musculoskeletal:  Patient does not have significant CVA tenderness in the  flank with palpation or percussion  They full range of motion in all 4 extremities  Strength in all 4 extremities appears congruent  Patient is able to ambulate without assistance or difficulty  Dermatologic:  Patient has no skin abnormalities or rashes        LABS:     CBC:   Lab Results   Component Value Date    WBC 9 82 08/22/2022    HGB 13 0 08/22/2022    HCT 38 8 08/22/2022    MCV 89 08/22/2022     08/22/2022       BMP:   Lab Results   Component Value Date    CALCIUM 9 1 09/08/2022    K 4 2 09/08/2022    CO2 23 09/08/2022     09/08/2022    BUN 22 09/08/2022 CREATININE 0 96 2022         IMAGIN/13/22  MULTIPARAMETRIC MRI OF THE PROSTATE WITH AND WITHOUT CONTRAST-WITH 3-D POSTPROCESSING      INDICATION:   R97 20: Elevated prostate specific antigen (PSA)  N40 3: Nodular prostate with lower urinary tract symptoms  N13 8: Other obstructive and reflux uropathy      COMPARISON: None     PSA LEVEL: 2 6 ng/ml  2022  PRIOR BIOPSY DATE: Unknown  BIOPSY RESULTS: Unknown      TECHNIQUE: The following pulse sequences were obtained:  Small field-of-view axial T1-weighted and multiplanar T2-weighted images; DWI axial and ADC map; large field of view axial T2 weighted images; T1w in-phase and opposed-phase axials of entire pelvis   and dynamic 3D T1w axial before and during IV contrast injection  Imaging performed on 3 0T MRI      CONTRAST:  Gadobutrol (Gadavist) 9 mL of Gadobutrol injection (SINGLE-DOSE)     TECHNICAL LIMITATIONS: None      FINDINGS:     PROSTATE:     Size: 5 1 x 4 4 x 4 6 cm = 49 7 cc  Post-biopsy hemorrhage:  None  Central gland enlargement (BPH): Mild      Focal lesions -      Lesion: 1       Size: 1 2 x 0 8 x 0 9 cm, 0 49 cc  Location: Right posterior medial peripheral zone at the apex       T2-weighted images: Score 3: Heterogeneous signal or noncircumscribed, rounded, moderate hypointensity; includes others that do not qualify as 2, 4 or 5  Diffusion-weighted images: Score 4: Focal markedly hypointense on ADC and markedly hyperintense on high b-value DWI; less than 1 5 cm in greatest dimension  Dynamic post-contrast images: (-) Lesion that does not enhance early compared to the surrounding prostate or enhances diffusely so that the margins of the enhancing area do not correspond to a finding on T2-weighted images and/or DWI  PI-RADS Assessment Category: 4, High (clinically significant cancer is likely to be present)    Extra-prostatic extension (EPE): Abuts capsule without visualized EPE      SEMINAL VESICLES: Unremarkable     Note: Clinically significant cancer is defined on pathology/histology as Pamela score greater than or equal to 7, and/or volume of greater than or equal to 0 5 mL, and/or extraprostatic extension      URINARY BLADDER: Unremarkable      LYMPH NODES: No pelvic lymphadenopathy      BONES: No suspicious osseous lesion      There are bilateral fat-containing inguinal hernias  There is diverticulosis coli      IMPRESSION:     1  PI-RADSv2 1 Category 4 -High (clinically significant cancer is likely to be present)  Right posterior medial peripheral zone at the apex      2  No extraprostatic tumor, seminal vesicle invasion, pelvic lymphadenopathy, or pelvic osseous metastatic disease      3  Calculated prostate volume of 49 7 cc  PATHOLOGY:     6/8/22  Final Diagnosis   A  Prostate, RIGHT ANTERIOR MEDIAL needle biopsy:    -Benign prostatic tissue          B  Prostate, RIGHT BASE, needle Biopsy:   - Prostatic adenocarcinoma, confirmed by triple stain   -Pamela grade 3 +4 = score  7  -Percentage of Grade 4:  10%  -Tumor involves two submitted cores  -Tumor  discontinuously involves approximately 10% , 80% of  each core biopsy  - Perineural invasion: Not identified        C  Prostate, RIGHT POSTERIOR MEDIAL, needle biopsy:    -Benign prostatic tissue          D  Prostate, LEFT BASE, needle biopsy:    -Prostate tissue with small focus of atypical glands          E  Prostate, LEFT POSTERIOR MEDIAL, needle biopsy:    -Benign prostatic tissue  Note  Triple stain shows positive staining of myoepithelial cell layer by , P63  Racemase stain is negative  Controls reacted appropriately      F  Prostate, LEFT POSTERIOR LATERAL, needle biopsy:    -Prostate tissue with small focus of atypical glands  Note  Triple stain shows focal absent staining of myoepithelial cell layer by , P63  Racemase stain is negative  Controls reacted appropriately      G   Prostate, LEFT ANTERIOR LATERAL, needle biopsy: -Benign prostatic tissue          H  Prostate, LEFT ANTERIOR MEDIAL,  needle biopsy:    -Benign prostatic tissue          I  Prostate, RIGHT POSTERIOR LATERAL, needle Biopsy:   - Prostatic adenocarcinoma, confirmed by triple stain   -Huron grade 3 + 3 = score  6  -Tumor involves one of two submitted cores  -Tumor involves approximately 10% of  core biopsy  - Perineural invasion: Not identified        J  Prostate, RIGHT ANTERIOR LATERAL,  needle Biopsy:   - Prostatic adenocarcinoma, confirmed by triple stain   -Huron grade 3 +4 = score  7  -Percentage of Grade 4:  10%  -Tumor involves two submitted cores  -Tumor involves approximately 20% , 20% of  each core biopsy  - Perineural invasion: Not identified        K  Prostate, Region of Interest,  needle Biopsy:   - Prostatic adenocarcinoma, confirmed by triple stain   -Huron grade 3 +3 = score  6  -Tumor involves four of five submitted cores  -Tumor involves approximately 35% , 50%, 60%, 75%  of  each core biopsy  - Perineural invasion: Not identified     NOMOGRAM:       ASSESSMENT:     8/11/22  Final Diagnosis   A  Soft Tissue, cely-prostatic fat:  - Benign fibroadipose tissue   - Negative for carcinoma      B  Prostate, prostate and seminal vesicles:  - Prostatic adenocarcinoma, Huron score 7 (3+4), grade group 2   - Lymphovascular invasion is not identified  - Extraprostatic extension is not identified  - Inked margins, negative for carcinoma  - Seminal vesicles, negative for carcinoma  - Please see note and synoptic report  ASSESSMENT:     60 y o  male with [id number] Pamela 3+4=7 (negative SM) s/p RALP 8/11/22    PLAN:     PSA is reassuring  Recheck in 3 months time  PSA every 3 months for year 1  Incontinence is starting to improve  However, it appears we have somewhat plateaued between the last visit and this    Patient is not inclined towards any more aggressive intervention at this time and is hopeful for continued improvement over the first year  We did discuss the option of referral for advanced male sling or urinary sphincter device  He is not yet ready to discuss  Patient has not had any erectile function return  Will continue daily Cialis and vacuum erection devices  On-demand Cialis ordered at this visit with instructions to administer the medication 1 hour before desired sexual activity on an empty stomach  Dosing escalation discussed

## 2023-06-09 ENCOUNTER — TELEPHONE (OUTPATIENT)
Dept: FAMILY MEDICINE CLINIC | Facility: CLINIC | Age: 63
End: 2023-06-09

## 2023-06-09 NOTE — TELEPHONE ENCOUNTER
Received a call from the patient's wife asking to have his Ventolin HFA inhaler refilled  I do not see that medication, but I did see Flovent HFA that was discontinued  She said his inhaler is broken      Laban Papa in [place]

## 2023-06-12 DIAGNOSIS — J45.21 MILD INTERMITTENT REACTIVE AIRWAY DISEASE WITH ACUTE EXACERBATION: Primary | ICD-10-CM

## 2023-06-12 RX ORDER — ALBUTEROL SULFATE 90 UG/1
2 AEROSOL, METERED RESPIRATORY (INHALATION) EVERY 6 HOURS PRN
Qty: 18 G | Refills: 5 | Status: SHIPPED | OUTPATIENT
Start: 2023-06-12

## 2023-06-12 NOTE — TELEPHONE ENCOUNTER
Please let them know prescription was sent  Also, have them clarify does patient have a history of asthma  If he is having a current breathing issue we should probably take a look at them

## 2023-06-16 ENCOUNTER — TELEPHONE (OUTPATIENT)
Dept: UROLOGY | Facility: CLINIC | Age: 63
End: 2023-06-16

## 2023-06-16 ENCOUNTER — OFFICE VISIT (OUTPATIENT)
Dept: UROLOGY | Facility: CLINIC | Age: 63
End: 2023-06-16
Payer: COMMERCIAL

## 2023-06-16 VITALS
SYSTOLIC BLOOD PRESSURE: 126 MMHG | HEART RATE: 78 BPM | BODY MASS INDEX: 35.68 KG/M2 | OXYGEN SATURATION: 97 % | DIASTOLIC BLOOD PRESSURE: 80 MMHG | HEIGHT: 69 IN | WEIGHT: 240.9 LBS

## 2023-06-16 DIAGNOSIS — C61 PROSTATE CANCER (HCC): Primary | ICD-10-CM

## 2023-06-16 DIAGNOSIS — G95.9 MYELOPATHY (HCC): ICD-10-CM

## 2023-06-16 PROCEDURE — 99214 OFFICE O/P EST MOD 30 MIN: CPT | Performed by: UROLOGY

## 2023-06-20 ENCOUNTER — TELEPHONE (OUTPATIENT)
Dept: UROLOGY | Facility: AMBULATORY SURGERY CENTER | Age: 63
End: 2023-06-20

## 2023-06-20 NOTE — TELEPHONE ENCOUNTER
Tevin Rahman from [facility] calling in regarding paperwork that was faxed over for patient  They will need the following last two progress notes, recent labs, operative reports, diagnostic imaging or path reports for patient  Tevin Rahman is going to try to fax form again to office         CB: [phone]  [facility]

## 2023-06-20 NOTE — TELEPHONE ENCOUNTER
Records faxed ( 50 pgs ) to [facility] attention Gila Ashton at [phone] and confirmation received  I also called Gila Ashton at [phone] and left her a voicemail that records were faxed and to call with any questions  detailed exam

## 2023-06-30 DIAGNOSIS — I10 HYPERTENSION, UNSPECIFIED TYPE: ICD-10-CM

## 2023-07-04 RX ORDER — IRBESARTAN 75 MG/1
TABLET ORAL
Qty: 90 TABLET | Refills: 3 | Status: SHIPPED | OUTPATIENT
Start: 2023-07-04

## 2023-07-12 ENCOUNTER — RA CDI HCC (OUTPATIENT)
Dept: OTHER | Facility: HOSPITAL | Age: 63
End: 2023-07-12

## 2023-07-12 NOTE — PROGRESS NOTES
[address] [facility] coding opportunities       Chart reviewed, no opportunity found: CHART REVIEWED, NO OPPORTUNITY FOUND     Patients Insurance     Commercial Insurance: Heron Verma

## 2023-07-14 DIAGNOSIS — N52.31 ERECTILE DYSFUNCTION AFTER RADICAL PROSTATECTOMY: ICD-10-CM

## 2023-07-14 RX ORDER — SILDENAFIL CITRATE 20 MG/1
TABLET ORAL
Qty: 30 TABLET | Refills: 3 | Status: SHIPPED | OUTPATIENT
Start: 2023-07-14

## 2023-07-17 ENCOUNTER — OFFICE VISIT (OUTPATIENT)
Dept: FAMILY MEDICINE CLINIC | Facility: CLINIC | Age: 63
End: 2023-07-17
Payer: COMMERCIAL

## 2023-07-17 VITALS
TEMPERATURE: 96.8 F | HEIGHT: 69 IN | SYSTOLIC BLOOD PRESSURE: 118 MMHG | OXYGEN SATURATION: 97 % | DIASTOLIC BLOOD PRESSURE: 80 MMHG | BODY MASS INDEX: 35.55 KG/M2 | HEART RATE: 84 BPM | WEIGHT: 240 LBS

## 2023-07-17 DIAGNOSIS — N39.498 OTHER URINARY INCONTINENCE: ICD-10-CM

## 2023-07-17 DIAGNOSIS — Z00.00 WELL ADULT EXAM: Primary | ICD-10-CM

## 2023-07-17 DIAGNOSIS — C61 PROSTATE CANCER (HCC): ICD-10-CM

## 2023-07-17 DIAGNOSIS — Z90.79 H/O RADICAL PROSTATECTOMY: ICD-10-CM

## 2023-07-17 DIAGNOSIS — N52.31 ERECTILE DYSFUNCTION AFTER RADICAL PROSTATECTOMY: ICD-10-CM

## 2023-07-17 PROBLEM — N42.9 ABNORMALITY DETECTED ON RECTAL EXAMINATION OF PROSTATE: Status: RESOLVED | Noted: 2022-04-28 | Resolved: 2023-07-17

## 2023-07-17 PROBLEM — R32 URINARY BLADDER INCONTINENCE: Status: ACTIVE | Noted: 2022-08-26

## 2023-07-17 PROCEDURE — 99396 PREV VISIT EST AGE 40-64: CPT | Performed by: FAMILY MEDICINE

## 2023-07-17 NOTE — PROGRESS NOTES
Assessment/Plan: Anticipatory guidance provided. He should be surgically cleared medically if he needs surgery over the next month. We did discuss the possibility of utilizing medication for possible mild depression symptoms but patient deferred this at this time. He is understandably saddened by the clinical situation of being incontinent. Hopefully this surgery is helpful. Recent lab testing was reviewed today today. 1. Well adult exam    2. H/O radical prostatectomy    3. Prostate cancer ([address])    4. Erectile dysfunction after radical prostatectomy    5. Other urinary incontinence          Subjective:      Patient ID: Jameel Mann is a 60 y.o. male. Patient is here for well check. He has history of incontinence and urinary dribbling and leakage after prostatectomy. He is now nearly a year post prostatectomy. Plan by his urologist is to have him seen at [facility] will likely bladder sling surgery for treatment of incontinence. No evidence of prostate cancer recurrence. Patient is otherwise feeling well although he is saddened by the incontinence but does not appear to be clinically depressed. Depression Screening and Follow-up Plan: Patient's depression screening was positive with a PHQ-2 score of 3. Their PHQ-9 score was 4. The following portions of the patient's history were reviewed and updated as appropriate: allergies, current medications, past family history, past medical history, past social history, past surgical history, and problem list.    Review of Systems   Constitutional: Negative. HENT: Negative. Eyes: Negative. Respiratory: Negative. Cardiovascular: Negative. Gastrointestinal: Negative. Endocrine: Negative. Genitourinary:        As noted in HPI   Musculoskeletal: Negative. Skin: Negative. Allergic/Immunologic: Negative. Neurological: Negative. Hematological: Negative. Psychiatric/Behavioral: Negative.           Objective:      BP 118/80 (BP Location: Left arm, Patient Position: Sitting, Cuff Size: Standard)   Pulse 84   Temp (!) 96.8 °F (36 °C) (Tympanic)   Ht 5' 9" (1.753 m)   Wt 109 kg (240 lb)   SpO2 97%   BMI 35.44 kg/m²          Physical Exam  Vitals reviewed. Constitutional:       Appearance: He is well-developed. HENT:      Head: Normocephalic and atraumatic. Right Ear: External ear normal. Tympanic membrane is not erythematous or bulging. Left Ear: External ear normal. Tympanic membrane is not erythematous or bulging. Nose: Nose normal.      Mouth/Throat:      Mouth: No oral lesions. Pharynx: No oropharyngeal exudate. Eyes:      General: No scleral icterus. Right eye: No discharge. Left eye: No discharge. Conjunctiva/sclera: Conjunctivae normal.   Neck:      Thyroid: No thyromegaly. Cardiovascular:      Rate and Rhythm: Normal rate and regular rhythm. Heart sounds: Normal heart sounds. No murmur heard. No friction rub. No gallop. Pulmonary:      Effort: Pulmonary effort is normal. No respiratory distress. Breath sounds: No wheezing or rales. Chest:      Chest wall: No tenderness. Abdominal:      General: Bowel sounds are normal. There is no distension. Palpations: Abdomen is soft. There is no mass. Tenderness: There is no abdominal tenderness. There is no guarding or rebound. Musculoskeletal:         General: No tenderness or deformity. Normal range of motion. Cervical back: Normal range of motion and neck supple. Lymphadenopathy:      Cervical: No cervical adenopathy. Skin:     General: Skin is warm and dry. Coloration: Skin is not pale. Findings: No erythema or rash. Neurological:      Mental Status: He is alert and oriented to person, place, and time. Cranial Nerves: No cranial nerve deficit. Motor: No abnormal muscle tone.       Coordination: Coordination normal.      Deep Tendon Reflexes: Reflexes are normal and symmetric.    Psychiatric:         Behavior: Behavior normal.

## 2023-07-28 ENCOUNTER — TELEMEDICINE (OUTPATIENT)
Dept: FAMILY MEDICINE CLINIC | Facility: CLINIC | Age: 63
End: 2023-07-28
Payer: COMMERCIAL

## 2023-07-28 DIAGNOSIS — U07.1 COVID-19: Primary | ICD-10-CM

## 2023-07-28 DIAGNOSIS — C61 PROSTATE CANCER (HCC): ICD-10-CM

## 2023-07-28 PROCEDURE — 99214 OFFICE O/P EST MOD 30 MIN: CPT | Performed by: FAMILY MEDICINE

## 2023-07-28 RX ORDER — PROMETHAZINE HYDROCHLORIDE AND CODEINE PHOSPHATE 6.25; 1 MG/5ML; MG/5ML
5 SYRUP ORAL EVERY 6 HOURS PRN
Qty: 118 ML | Refills: 0 | Status: SHIPPED | OUTPATIENT
Start: 2023-07-28 | End: 2023-08-04

## 2023-07-28 RX ORDER — NIRMATRELVIR AND RITONAVIR 300-100 MG
3 KIT ORAL 2 TIMES DAILY
Qty: 30 TABLET | Refills: 0 | Status: SHIPPED | OUTPATIENT
Start: 2023-07-28 | End: 2023-08-02

## 2023-07-28 NOTE — PROGRESS NOTES
COVID-19 Outpatient Progress Note    Assessment/Plan:    Problem List Items Addressed This Visit        Genitourinary    Prostate cancer ([address])   Other Visit Diagnoses     COVID-19    -  Primary    Relevant Medications    nirmatrelvir & ritonavir (Paxlovid, 300/100,) tablet therapy pack    promethazine-codeine (PHENERGAN WITH CODEINE) 6.25-10 mg/5 mL syrup         Disposition:     Patient has asymptomatic or mild COVID-19 infection. Based off CDC guidelines, they were recommended to isolate for 5 days. If they are asymptomatic or symptoms are improving with no fevers in the past 24 hours, isolation may be ended followed by 5 days of wearing a mask when around othes to minimize risk of infecting others. If still have a fever or other symptoms have not improved, continue to isolate until they improve. Regardless of when they end isolation, avoid being around people who are more likely to get very sick from COVID-19 until at least day 11. Discussed symptom directed medication options with patient. Discussed vitamin D, vitamin C, and/or zinc supplementation with patient. Patient is stable and having mild symptoms. We discussed risk and benefits of treatment. He is worried that he may develop symptoms which will affect his recent surgery. We will send Paxlovid and cough medicine to pharmacy to be used if symptoms develop. Has trialed Tessalon Perles in the past but they do not work. We will send Phenergan with codeine. PDMP reviewed no red flags. Advised to hold statin while on Paxlovid. Interaction between Cialis discussed as well. Continue to monitor for worsening fevers and urinary symptoms. Patient meets criteria for PAXLOVID and they have been counseled appropriately according to EUA documentation released by the FDA. After discussion, patient agrees to treatment.     Ashly Saber is an investigational medicine used to treat mild-to-moderate COVID-19 in adults and children (15years of age and older weighing at least 88 pounds (40 kg)) with positive results of direct SARS-CoV-2 viral testing, and who are at high risk for progression to severe COVID-19, including hospitalization or death. PAXLOVID is investigational because it is still being studied. There is limited information about the safety and effectiveness of using PAXLOVID to treat people with mild-to-moderate COVID-19. The FDA has authorized the emergency use of PAXLOVID for the treatment of mild-tomoderate COVID-19 in adults and children (15years of age and older weighing at least 80 pounds (40 kg)) with a positive test for the virus that causes COVID-19, and who are at high risk for progression to severe COVID-19, including hospitalization or death, under an EUA. What should I tell my healthcare provider before I take PAXLOVID? Tell your healthcare provider if you:  - Have any allergies  - Have liver or kidney disease  - Are pregnant or plan to become pregnant  - Are breastfeeding a child  - Have any serious illnesses    Tell your healthcare provider about all the medicines you take, including prescription and over-the-counter medicines, vitamins, and herbal supplements. Some medicines may interact with PAXLOVID and may cause serious side effects. Keep a list of your medicines to show your healthcare provider and pharmacist when you get a new medicine. You can ask your healthcare provider or pharmacist for a list of medicines that interact with PAXLOVID. Do not start taking a new medicine without telling your healthcare provider. Your healthcare provider can tell you if it is safe to take PAXLOVID with other medicines. Tell your healthcare provider if you are taking combined hormonal contraceptive. PAXLOVID may affect how your birth control pills work. Females who are able to become pregnant should use another effective alternative form of contraception or an additional barrier method of contraception.  Talk to your healthcare provider if you have any questions about contraceptive methods that might be right for you. How do I take PAXLOVID? PAXLOVID consists of 2 medicines: nirmatrelvir and ritonavir. - Take 2 pink tablets of nirmatrelvir with 1 white tablet of ritonavir by mouth 2 times each day (in the morning and in the evening) for 5 days. For each dose, take all 3 tablets at the same time. - If you have kidney disease, talk to your healthcare provider. You may need a different dose. - Swallow the tablets whole. Do not chew, break, or crush the tablets  - Take PAXLOVID with or without food. - Do not stop taking PAXLOVID without talking to your healthcare provider, even if you feel better. - If you miss a dose of PAXLOVID within 8 hours of the time it is usually taken, take it as soon as you remember. If you miss a dose by more than 8 hours, skip the missed dose and take the next dose at your regular time. Do not take 2 doses of PAXLOVID at the same time. - If you take too much PAXLOVID, call your healthcare provider or go to the nearest hospital emergency room right away. - If you are taking a ritonavir- or cobicistat-containing medicine to treat hepatitis C or Human Immunodeficiency Virus (HIV), you should continue to take your medicine as prescribed by your healthcare provider.  - Talk to your healthcare provider if you do not feel better or if you feel worse after 5 days. Who should generally not take PAXLOVID? Do not take PAXLOVID if:  You are allergic to nirmatrelvir, ritonavir, or any of the ingredients in PAXLOVID.     You are taking any of the following medicines:  - Alfuzosin  - Pethidine, piroxicam, propoxyphene  - Ranolazine  - Amiodarone, dronedarone, flecainide, propafenone, quinidine  - Colchicine  - Lurasidone, pimozide, clozapine  - Dihydroergotamine, ergotamine, methylergonovine  - Lovastatin, simvastatin  - Sildenafil (Revatio®) for pulmonary arterial hypertension (PAH)  - Triazolam, oral midazolam  - Apalutamide  - Carbamazepine, phenobarbital, phenytoin  - Rifampin  - Hideout’s Wort (hypericum perforatum)    What are the important possible side effects of PAXLOVID? Possible side effects of PAXLOVID are:  - Liver Problems. Tell your healthcare provider right away if you have any of these signs and symptoms of liver problems: loss of appetite, yellowing of your skin and the whites of eyes (jaundice), dark-colored urine, pale colored stools and itchy skin, stomach area (abdominal) pain. - Resistance to HIV Medicines. If you have untreated HIV infection, PAXLOVID may lead to some HIV medicines not working as well in the future. - Other possible side effects include: altered sense of taste, diarrhea, high blood pressure, or muscle aches    These are not all the possible side effects of PAXLOVID. Not many people have taken PAXLOVID. Serious and unexpected side effects may happen. [place] Arun is still being studied, so it is possible that all of the risks are not known at this time. What other treatment choices are there? Like Jean Paul Oh may allow for the emergency use of other medicines to treat people with COVID-19. Go to https://Medpricer.com/ for information on the emergency use of other medicines that are authorized by FDA to treat people with COVID-19. Your healthcare provider may talk with you about clinical trials for which you may be eligible. It is your choice to be treated or not to be treated with PAXLOVID. Should you decide not to receive it or for your child not to receive it, it will not change your standard medical care. What if I am pregnant or breastfeeding? There is no experience treating pregnant women or breastfeeding mothers with PAXLOVID. For a mother and unborn baby, the benefit of taking PAXLOVID may be greater than the risk from the treatment.  If you are pregnant, discuss your options and specific situation with your healthcare provider. It is recommended that you use effective barrier contraception or do not have sexual activity while taking PAXLOVID. If you are breastfeeding, discuss your options and specific situation with your healthcare provider. How do I report side effects with PAXLOVID? Contact your healthcare provider if you have any side effects that bother you or do not go away. Report side effects to FDA MedWatch at www.fda.gov/medwatch or call 7-953-ADI3673 or you can report side effects to [facility]. at the contact information provided below. Website Fax number Telephone number   [employer] [phone]     How should I store [address] [place] [address]? Store PAXLOVID tablets at room temperature between 68°F to 77°F (20°C to 25°C). Full fact sheet for patients, parents, and caregivers can be found at: [employer].co.za    I have spent a total time of 20 minutes on the day of the encounter for this patient including discussing diagnostic results, discussing prognosis, risks and benefits of treatment options, instructions for management, patient and family education, importance of treatment compliance, risk factor reductions, counseling/coordination of care, documenting in the medical record and obtaining or reviewing history. Encounter provider: Rtuh Cardenas MD     Provider located at: [address] [place] [address]  [address] [facility] [address] [facility] [id number]     Recent Visits  No visits were found meeting these conditions. Showing recent visits within past 7 days and meeting all other requirements  Today's Visits  Date Type Provider Dept   07/28/23 Telemedicine Ruth Cardenas MD [facility] [address] today's visits and meeting all other requirements  Future Appointments  No visits were found meeting these conditions.   Showing future appointments within next 150 days and meeting all other requirements     This virtual check-in was done via [facility] and patient was informed that this is a secure, HIPAA-compliant platform. He agrees to proceed. Patient agrees to participate in a virtual check in via telephone or video visit instead of presenting to the office to address urgent/immediate medical needs. Patient is aware this is a billable service. He acknowledged consent and understanding of privacy and security of the video platform. The patient has agreed to participate and understands they can discontinue the visit at any time. After connecting through [facility], the patient was identified by name and date of birth. Merari Mendez was informed that this was a telemedicine visit and that the exam was being conducted confidentially over secure lines. My office door was closed. No one else was in the room. Merari Mendez acknowledged consent and understanding of privacy and security of the telemedicine visit. I informed the patient that I have reviewed his record in Epic and presented the opportunity for him to ask any questions regarding the visit today. The patient agreed to participate. Verification of patient location:  Patient is located in the following state in which I hold an active license: [place]    Subjective:   Merari Mendez is a 60 y.o. male who has been screened for COVID-19. Symptom change since last report: improving. Patient's symptoms include fever, fatigue, nausea, vomiting (x1) and headache. Patient denies chills, malaise, congestion, rhinorrhea, sore throat, anosmia, loss of taste, cough, shortness of breath, chest tightness, abdominal pain, diarrhea and myalgias. - Date of symptom onset: 7/28/2023  - Date of positive COVID-19 test: 7/28/2023. Type of test: Home antigen. Patient with typical symptoms of COVID-19 and they attest that they were positive on home rapid antigen testing.  Image of positive result is not able to be uploaded into their chart. COVID-19 vaccination status: Fully vaccinated with booster    Marylin George has been staying home and has isolated themselves in his home. He is taking care to not share personal items and is cleaning all surfaces that are touched often, like counters, tabletops, and doorknobs using household cleaning sprays or wipes. He is wearing a mask when he leaves his room. Had surgery for  incontinence yesterday. He was told to not strain and be careful when coughing. He is worried if he develops the symptoms it might reverse his surgery and he might need to have it redone. He is feeling okay today and better than he did last night. Fever down to 98.6 with Tylenol. Lab Results   Component Value Date    SARSCOV2 Negative 08/06/2022    [address] [facility] Not Detected 12/09/2021       Review of Systems   Constitutional: Positive for fatigue and fever. Negative for chills. HENT: Negative for congestion, rhinorrhea and sore throat. Respiratory: Negative for cough, chest tightness and shortness of breath. Gastrointestinal: Positive for nausea and vomiting (x1). Negative for abdominal pain and diarrhea. Musculoskeletal: Negative for myalgias. Neurological: Positive for headaches.      Current Outpatient Medications on File Prior to Visit   Medication Sig   • albuterol (Ventolin HFA) 90 mcg/act inhaler Inhale 2 puffs every 6 (six) hours as needed for wheezing (Patient not taking: Reported on 7/17/2023)   • aspirin (ECOTRIN LOW STRENGTH) 81 mg EC tablet Take 81 mg by mouth daily   • docusate sodium (COLACE) 100 mg capsule Take 1 capsule (100 mg total) by mouth 2 (two) times a day (Patient not taking: Reported on 11/30/2022)   • irbesartan (AVAPRO) 150 mg tablet Take 1 tablet (150 mg total) by mouth daily at bedtime   • irbesartan (AVAPRO) 75 mg tablet TAKE 1 TABLET BY MOUTH EVERY DAY   • rosuvastatin (CRESTOR) 5 mg tablet Take 1 tablet (5 mg total) by mouth every other day   • sildenafil (REVATIO) 20 mg tablet TAKE ONE TABLET BY MOUTH once, one hour before intercourse on an empty stomach   • tadalafil (CIALIS) 5 MG tablet Take 1 tablet (5 mg total) by mouth in the morning       Objective: There were no vitals taken for this visit. Physical Exam  Vitals and nursing note reviewed. Constitutional:       General: He is not in acute distress. Appearance: Normal appearance. He is not ill-appearing, toxic-appearing or diaphoretic. Pulmonary:      Effort: Pulmonary effort is normal.   Neurological:      Mental Status: He is alert and oriented to person, place, and time. Psychiatric:         Mood and Affect: Mood normal.         Behavior: Behavior normal.         Thought Content:  Thought content normal.         Judgment: Judgment normal.       Kwaku Gerber MD

## 2023-09-15 DIAGNOSIS — I10 HYPERTENSION, UNSPECIFIED TYPE: ICD-10-CM

## 2023-09-15 RX ORDER — IRBESARTAN 150 MG/1
150 TABLET ORAL
Qty: 90 TABLET | Refills: 0 | Status: SHIPPED | OUTPATIENT
Start: 2023-09-15

## 2023-09-22 ENCOUNTER — OFFICE VISIT (OUTPATIENT)
Dept: UROLOGY | Facility: CLINIC | Age: 63
End: 2023-09-22
Payer: COMMERCIAL

## 2023-09-22 VITALS
DIASTOLIC BLOOD PRESSURE: 70 MMHG | SYSTOLIC BLOOD PRESSURE: 120 MMHG | HEART RATE: 82 BPM | BODY MASS INDEX: 35.25 KG/M2 | HEIGHT: 69 IN | WEIGHT: 238 LBS

## 2023-09-22 DIAGNOSIS — C61 PROSTATE CANCER (HCC): Primary | ICD-10-CM

## 2023-09-22 PROCEDURE — 99214 OFFICE O/P EST MOD 30 MIN: CPT | Performed by: UROLOGY

## 2023-09-22 RX ORDER — GABAPENTIN 300 MG/1
300 CAPSULE ORAL 3 TIMES DAILY PRN
COMMUNITY
Start: 2023-08-15 | End: 2024-08-14

## 2023-09-22 RX ORDER — MELOXICAM 15 MG/1
TABLET ORAL
COMMUNITY
Start: 2023-07-27

## 2023-09-22 NOTE — PROGRESS NOTES
UROLOGY FOLLOWUP NOTE     CHIEF COMPLAINT   Titus Smith is a 58 y.o. male with a complaint of   Chief Complaint   Patient presents with   • Follow-up       History of Present Illness:     58 y.o. male followed by primary care team with low normal PSA. Recent rectal exam was suspicious and so multiparametric MRI was ordered. Returns PI-RADS level 4 lesion. Patient denies a family history of prostate cancer. Has no urinary symptoms of complaint. Presents today for discussion. 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 Now s/p prostate biopsy. Still having some hematospermia. JORGE 13    Patient ultimately agreed to proceed to the operating room on 8/11/2022. Robotic prostatectomy performed. Patient had a difficult apical dissection and anastomosis which required bladder neck reconstruction on the left side. Hospital stay was uncomplicated. Patient did note some pericatheter urethral leak with spasms. Unfortunately, he was readmitted August 21st with fevers and a Klebsiella UTI. Antibiotic therapy was administered, inpatient cystogram performed which was negative, catheter removed. Patient had an extremely difficult postoperative course with prolonged incontinence. This began to affect the patient's mood and led to some significant coping. We ultimately agreed for referral.    At my suggestion, the patient was referred for discussion of continence surgery. Ultimately underwent artificial urinary sphincter placement. Had some lingering scrotal discomfort after the procedure now resolved with the use of gabapentin. Has been followed by the group. He is no longer taking any medication. Still having very mild dribbling. Due to see Dr. Quyen Neal back in December. With daily Cialis and vacuum erection device, has not had any return of erectile function. On demand Cialis up to 100 mg without significant ability to achieve rigid erections for penetration.   Patient has had some spontaneous erections in the morning.     9/15/23  1:57 PM     Prostate Specific Antigen <4.00 ng/mL <0.01        Past Medical History:     Past Medical History:   Diagnosis Date   • Anxiety    • Cancer ([address]) 6/15/22    Prostatic   • GERD (gastroesophageal reflux disease) 2001   • HL (hearing loss)     B/L   • Hyperlipidemia    • Hypertension    • Obesity    • Prostate cancer ([address])        PAST SURGICAL HISTORY:     Past Surgical History:   Procedure Laterality Date   • COLONOSCOPY     • CT CYSTOGRAM  08/24/2022   • WA BX PROSTATE STRTCTC SATURATION SAMPLING IMG GID N/A 06/08/2022    Procedure: TRANSPERINEAL MRI FUSION BIOPSY PROSTATE;  Surgeon: Osmani Carson MD;  Location: [facility];  Service: Urology   • WA LAPS SURG [id number] RPBIC RAD W/NRV SPARING ROBOT N/A 08/11/2022    Procedure: ROBOTIC PROSTATECTOMY RADICAL , BLADDER NECK RECONSTRUCTION;  Surgeon: Marcin Riojas MD;  Location: [facility];  Service: Urology   • PROSTATE SURGERY  8/11/2022    Cancer   • VARICOSE VEIN SURGERY     • VASECTOMY     • WISDOM TOOTH EXTRACTION         CURRENT MEDICATIONS:     Current Outpatient Medications   Medication Sig Dispense Refill   • aspirin (ECOTRIN LOW STRENGTH) 81 mg EC tablet Take 81 mg by mouth daily     • gabapentin (NEURONTIN) 300 mg capsule Take 300 mg by mouth Three times daily as needed     • irbesartan (AVAPRO) 150 mg tablet TAKE ONE TABLET BY MOUTH AT BEDTIME 90 tablet 0   • irbesartan (AVAPRO) 75 mg tablet TAKE 1 TABLET BY MOUTH EVERY DAY 90 tablet 3   • meloxicam (MOBIC) 15 mg tablet      • rosuvastatin (CRESTOR) 5 mg tablet Take 1 tablet (5 mg total) by mouth every other day 90 tablet 3   • tadalafil (CIALIS) 5 MG tablet Take 1 tablet (5 mg total) by mouth in the morning 90 tablet 3   • albuterol (Ventolin HFA) 90 mcg/act inhaler Inhale 2 puffs every 6 (six) hours as needed for wheezing (Patient not taking: Reported on 7/17/2023) 18 g 5   • docusate sodium (COLACE) 100 mg capsule Take 1 capsule (100 mg total) by mouth 2 (two) times a day (Patient not taking: Reported on 11/30/2022) 30 capsule 0   • sildenafil (REVATIO) 20 mg tablet TAKE ONE TABLET BY MOUTH once, one hour before intercourse on an empty stomach (Patient not taking: Reported on 9/22/2023) 30 tablet 3     No current facility-administered medications for this visit. ALLERGIES:   No Known Allergies    SOCIAL HISTORY:     Social History     Socioeconomic History   • Marital status: /Civil Union     Spouse name: None   • Number of children: None   • Years of education: None   • Highest education level: None   Occupational History   • None   Tobacco Use   • Smoking status: Never   • Smokeless tobacco: Never   Vaping Use   • Vaping Use: Never used   Substance and Sexual Activity   • Alcohol use: Yes     Alcohol/week: 1.0 standard drink of alcohol     Types: 1 Standard drinks or equivalent per week     Comment: Once a month or so   • Drug use: Not Currently     Comment: College   • Sexual activity: Not Currently     Partners: Female     Birth control/protection: Male Sterilization   Other Topics Concern   • None   Social History Narrative   • None     Social Determinants of Health     Financial Resource Strain: Not on file   Food Insecurity: No Food Insecurity (8/22/2022)    Hunger Vital Sign    • Worried About Running Out of Food in the Last Year: Never true    • Ran Out of Food in the Last Year: Never true   Transportation Needs: No Transportation Needs (8/22/2022)    PRAPARE - Transportation    • Lack of Transportation (Medical): No    • Lack of Transportation (Non-Medical):  No   Physical Activity: Not on file   Stress: Not on file   Social Connections: Not on file   Intimate Partner Violence: Not on file   Housing Stability: Low Risk  (8/22/2022)    Housing Stability Vital Sign    • Unable to Pay for Housing in the Last Year: No    • Number of Places Lived in the Last Year: 1    • Unstable Housing in the Last Year: No       SOCIAL HISTORY:     Family History   Problem Relation Age of Onset   • Sickle cell trait Mother    • Hypertension Mother    • Stroke Mother            • Hypertension Father    • Heart disease Father    • Cardiomyopathy Brother    • Alcohol abuse Brother    • Substance Abuse Brother    • Mental illness Brother    • Bipolar disorder Brother    • Psychiatric Illness Brother    • Sickle cell trait Maternal Grandmother    • Sickle cell trait Maternal Grandfather    • Hypertension Paternal Grandfather        REVIEW OF SYSTEMS:     Review of Systems   Constitutional: Negative. Respiratory: Negative. Cardiovascular: Negative. Gastrointestinal: Negative. Genitourinary: Positive for enuresis (minimal s/p AUS). Negative for scrotal swelling. Musculoskeletal: Negative. Skin: Negative. Psychiatric/Behavioral: Negative. PHYSICAL EXAM:     /70 (BP Location: Left arm, Patient Position: Sitting, Cuff Size: Adult)   Pulse 82   Ht 5' 9" (1.753 m)   Wt 108 kg (238 lb)   BMI 35.15 kg/m²     General:  Healthy appearing male in no acute distress. They have a normal affect. There is not appear to be any gross neurologic defects or abnormalities. HEENT:  Normocephalic, atraumatic. Neck is supple without any palpable lymphadenopathy  Cardiovascular:  Patient has normal palpable distal radial pulses. There is no significant peripheral edema. No JVD is noted. Respiratory:  Patient has unlabored respirations. There is no audible wheeze or rhonchi. Abdomen:  Abdomen is with healing robotic surgical scars. Abdomen is soft and nontender. There is no tympany. Inguinal and umbilical hernia are not appreciated.   : Right abdominal incision is healing, palpation of reservoir under skin, right hemiscrotal pump in place without induration or pain, perineal incision is well-healed without discomfort   musculoskeletal:  Patient does not have significant CVA tenderness in the  flank with palpation or percussion. They full range of motion in all 4 extremities. Strength in all 4 extremities appears congruent. Patient is able to ambulate without assistance or difficulty. Dermatologic:  Patient has no skin abnormalities or rashes. LABS:     CBC:   Lab Results   Component Value Date    WBC 9.82 2022    HGB 13.0 2022    HCT 38.8 2022    MCV 89 2022     2022       BMP:   Lab Results   Component Value Date    CALCIUM 9.1 2022    K 4.2 2022    CO2 23 2022     2022    BUN 22 2022    CREATININE 0.96 2022       9/15/23  1:57 PM     Prostate Specific Antigen <4.00 ng/mL <0.01          IMAGIN/13/22  MULTIPARAMETRIC MRI OF THE PROSTATE WITH AND WITHOUT CONTRAST-WITH 3-D POSTPROCESSING.     INDICATION:   R97.20: Elevated prostate specific antigen (PSA)  N40.3: Nodular prostate with lower urinary tract symptoms  N13.8: Other obstructive and reflux uropathy.     COMPARISON: None     PSA LEVEL: 2.6 ng/ml  2022. PRIOR BIOPSY DATE: Unknown. BIOPSY RESULTS: Unknown.     TECHNIQUE: The following pulse sequences were obtained:  Small field-of-view axial T1-weighted and multiplanar T2-weighted images; DWI axial and ADC map; large field of view axial T2 weighted images; T1w in-phase and opposed-phase axials of entire pelvis   and dynamic 3D T1w axial before and during IV contrast injection. Imaging performed on 3.0T MRI      CONTRAST:  Gadobutrol (Gadavist) 9 mL of Gadobutrol injection (SINGLE-DOSE)     TECHNICAL LIMITATIONS: None.     FINDINGS:     PROSTATE:     Size: 5.1 x 4.4 x 4.6 cm = 49.7 cc. Post-biopsy hemorrhage:  None. Central gland enlargement (BPH): Mild.     Focal lesions -      Lesion: 1       Size: 1.2 x 0.8 x 0.9 cm, 0.49 cc.        Location: Right posterior medial peripheral zone at the apex       T2-weighted images: Score 3: Heterogeneous signal or noncircumscribed, rounded, moderate hypointensity; includes others that do not qualify as 2, 4 or 5. Diffusion-weighted images: Score 4: Focal markedly hypointense on ADC and markedly hyperintense on high b-value DWI; less than 1.5 cm in greatest dimension. Dynamic post-contrast images: (-) Lesion that does not enhance early compared to the surrounding prostate or enhances diffusely so that the margins of the enhancing area do not correspond to a finding on T2-weighted images and/or DWI. PI-RADS Assessment Category: 4, High (clinically significant cancer is likely to be present). Extra-prostatic extension (EPE): Abuts capsule without visualized EPE.     SEMINAL VESICLES: Unremarkable     Note: Clinically significant cancer is defined on pathology/histology as [place] score greater than or equal to 7, and/or volume of greater than or equal to 0.5 mL, and/or extraprostatic extension.     URINARY BLADDER: Unremarkable.     LYMPH NODES: No pelvic lymphadenopathy.     BONES: No suspicious osseous lesion.     There are bilateral fat-containing inguinal hernias. There is diverticulosis coli.     IMPRESSION:     1. PI-RADSv2.1 Category 4 -High (clinically significant cancer is likely to be present). Right posterior medial peripheral zone at the apex.     2. No extraprostatic tumor, seminal vesicle invasion, pelvic lymphadenopathy, or pelvic osseous metastatic disease.     3. Calculated prostate volume of 49.7 cc. PATHOLOGY:     6/8/22  Final Diagnosis   A. Prostate, RIGHT ANTERIOR MEDIAL needle biopsy:    -Benign prostatic tissue.         B. Prostate, RIGHT BASE, needle Biopsy:   - Prostatic adenocarcinoma, confirmed by triple stain   -[place] grade 3 +4 = score  7  -Percentage of Grade 4:  10%  -Tumor involves two submitted cores  -Tumor  discontinuously involves approximately 10% , 80% of  each core biopsy  - Perineural invasion: Not identified        C. Prostate, RIGHT POSTERIOR MEDIAL, needle biopsy:    -Benign prostatic tissue.         D.  Prostate, LEFT BASE, needle biopsy:    -Prostate tissue with small focus of atypical glands.         E. Prostate, LEFT POSTERIOR MEDIAL, needle biopsy:    -Benign prostatic tissue. Note  Triple stain shows positive staining of myoepithelial cell layer by , P63. Racemase stain is negative. Controls reacted appropriately      F. Prostate, LEFT POSTERIOR LATERAL, needle biopsy:    -Prostate tissue with small focus of atypical glands. Note  Triple stain shows focal absent staining of myoepithelial cell layer by , P63. Racemase stain is negative. Controls reacted appropriately      G. Prostate, LEFT ANTERIOR LATERAL, needle biopsy:    -Benign prostatic tissue.         H. Prostate, LEFT ANTERIOR MEDIAL,  needle biopsy:    -Benign prostatic tissue.         I. Prostate, RIGHT POSTERIOR LATERAL, needle Biopsy:   - Prostatic adenocarcinoma, confirmed by triple stain   -[place] grade 3 + 3 = score  6  -Tumor involves one of two submitted cores  -Tumor involves approximately 10% of  core biopsy  - Perineural invasion: Not identified        J. Prostate, RIGHT ANTERIOR LATERAL,  needle Biopsy:   - Prostatic adenocarcinoma, confirmed by triple stain   -[place] grade 3 +4 = score  7  -Percentage of Grade 4:  10%  -Tumor involves two submitted cores  -Tumor involves approximately 20% , 20% of  each core biopsy  - Perineural invasion: Not identified        K. Prostate, Region of Interest,  needle Biopsy:   - Prostatic adenocarcinoma, confirmed by triple stain   -[place]am[place] grade 3 +3 = score  6  -Tumor involves four of five submitted cores  -Tumor involves approximately 35% , 50%, 60%, 75%  of  each core biopsy  - Perineural invasion: Not identified     NOMOGRAM:       ASSESSMENT:     8/11/22  Final Diagnosis   A. Soft Tissue, cely-prostatic fat:  - Benign fibroadipose tissue.  - Negative for carcinoma.     B.  Prostate, prostate and seminal vesicles:  - Prostatic adenocarcinoma, Pamela score 7 (3+4), grade group 2.  - Lymphovascular invasion is not identified. - Extraprostatic extension is not identified. - Inked margins, negative for carcinoma. - Seminal vesicles, negative for carcinoma. - Please see note and synoptic report. ASSESSMENT:     58 y.o. male with [id number] Pamela 3+4=7 (negative SM) s/p RALP 8/11/22    PLAN:     PSA remains undetectable 13mos postop, PSA again in 6mos at at 24months postop (8/2024)    Severe incontinence postop requiring referral for AUS implant (Janette). Patient doing much better with very minimal leakage at this point in time. Due to see the reconstructive team back in December. Patient has not had any erectile function return (preop JORGE 13). Will continue daily Cialis and vacuum erection devices for rehabilitation. Patient now more amenable to the idea of intracavernosal injection. Prescription will be sent to the compounding pharmacy and the patient will contact us once he has the medication for teaching visit.

## 2023-10-10 ENCOUNTER — TELEPHONE (OUTPATIENT)
Age: 63
End: 2023-10-10

## 2023-10-10 NOTE — TELEPHONE ENCOUNTER
----- Message from [place]. Jessica Reyes sent at 10/9/2023  5:43 PM EDT -----  Regarding: Visit for trimix therapy   Contact: [phone]  Hi. I’ve picked up the script for trimix. I’m comfortable with using it but there are no dosage instructions included. I have watched several videos and read many articles on where to inject the medication. What dosage do I start with? I received a 2 ml vial and it has no concentration noted on it. The syringes are marked in units. Most things I’ve seen and read recommend starting with 5-10 units. Please let me know what I should do.    Thanks  Marshall

## 2023-10-11 NOTE — TELEPHONE ENCOUNTER
Contacted [facility] and advised will require in person appointment for first TriMix injection. Patient scheduled with Jaylyn Kohli in [facility]. Advised to bring medication in cooler along with supplies.

## 2023-10-18 DIAGNOSIS — I10 HYPERTENSION, UNSPECIFIED TYPE: ICD-10-CM

## 2023-10-18 NOTE — TELEPHONE ENCOUNTER
irbesartan (AVAPRO) 75 mg tablet         Sig: Take 1 tablet (75 mg total) by mouth daily    Disp: 90 tablet    Refills: 0    Start: 10/18/2023    Class: Normal    Non-formulary For: Hypertension, unspecified type    To pharmacy: YOUR PATIENT HAS REQUESTED A REFILL OF THIS MEDICATION, PREVIOUSLY AUTHORIZED BY ANOTHER PRESCRIBER.     Last ordered: 3 months ago (7/4/2023) by Rafa Chaudhary DO    Cardiovascular:  Angiotensin Receptor Blockers Fuvycy93/18/2023 11:48 AM   Protocol Details K is 5.3 or below and within 360 days    eGFR is 60 or above and within 360 days    BP completed in the last 6 months    Valid encounter within last 6 months       irbesartan (AVAPRO) 150 mg tablet         Sig: Take 1 tablet (150 mg total) by mouth daily at bedtime    Disp: 90 tablet    Refills: 0    Start: 10/18/2023    Class: Normal    Non-formulary For: Hypertension, unspecified type    Last ordered: 1 month ago (9/15/2023) by Rafa Chaudhary DO    Cardiovascular:  Angiotensin Receptor Blockers Reopuz63/18/2023 11:48 AM   Protocol Details K is 5.3 or below and within 360 days    eGFR is 60 or above and within 360 days    BP completed in the last 6 months    Valid encounter within last 6 months      To be filled at: [facility], [address] [place] [address],[address]

## 2023-10-19 RX ORDER — IRBESARTAN 150 MG/1
150 TABLET ORAL
Qty: 90 TABLET | Refills: 0 | Status: SHIPPED | OUTPATIENT
Start: 2023-10-19

## 2023-10-19 RX ORDER — IRBESARTAN 75 MG/1
75 TABLET ORAL DAILY
Qty: 90 TABLET | Refills: 0 | Status: SHIPPED | OUTPATIENT
Start: 2023-10-19

## 2023-11-17 ENCOUNTER — OFFICE VISIT (OUTPATIENT)
Dept: UROLOGY | Facility: MEDICAL CENTER | Age: 63
End: 2023-11-17
Payer: COMMERCIAL

## 2023-11-17 VITALS
BODY MASS INDEX: 34.66 KG/M2 | WEIGHT: 234 LBS | HEART RATE: 71 BPM | OXYGEN SATURATION: 97 % | HEIGHT: 69 IN | SYSTOLIC BLOOD PRESSURE: 122 MMHG | DIASTOLIC BLOOD PRESSURE: 78 MMHG

## 2023-11-17 DIAGNOSIS — N52.31 ERECTILE DYSFUNCTION AFTER RADICAL PROSTATECTOMY: ICD-10-CM

## 2023-11-17 PROCEDURE — 99212 OFFICE O/P EST SF 10 MIN: CPT | Performed by: PHYSICIAN ASSISTANT

## 2023-11-17 RX ORDER — TADALAFIL 5 MG/1
5 TABLET ORAL DAILY
Qty: 90 TABLET | Refills: 3 | Status: SHIPPED | OUTPATIENT
Start: 2023-11-17

## 2023-11-17 NOTE — PROGRESS NOTES
11/17/2023  Lizeth Núñez  1960  [id number]      Assessment/Plan  Erectile dysfunction  Continuation of Tri Mix [20/1/30] injections at 0.25-0.30 mL dose    f/u Dr Clarissa Turk for AUS concerns  f/u Dr Marnie Ortega or me in spring for next 3 month PSA    Discussion  Lizeth Núñez is a 58 y.o. being managed by Dr Marnie Ortega He was provided verbal, written, and physical demonstration of intracavernosal injection use. He was instructed on storage, disposal, and titration of the medication. He was instructed on hospital precautions for a priapism and use of home pseudoephedrine. He is instructed not to use more than 2-3 doses per week, alternate left/right injection sites with each use to prevent scarring. He was able to demonstrate understanding of injection use. All questions were answered. Patient with NO history of sickle cell trait/disease, thrombocytopenia, cocaine or trazodone use. Has trialed various PDE5i either without sufficient benefit or with adverse side effects for which local therapy has been recommended instead. History of Present Illness  47 y.o. male with ED presents today for intracavernosal injection teaching. RALP August 2022 for prostate cncer  Preop JORGE 13  AUS for continence by Dr Clarissa Turk earlier this year  Using cialis and KULDEEP without return of erectile function though has had some spontaneous partial erections in the mornings    started few weeks ago at home self [facility] trimix 20/1/30 at 0.2 to 0.25ml with excellent effect. will continue    /78 (BP Location: Left arm, Patient Position: Sitting, Cuff Size: Standard)   Pulse 71   Ht 5' 9" (1.753 m)   Wt 106 kg (234 lb)   SpO2 97%   BMI 34.56 kg/m²       Procedure    Procedure not indicated. Pt continuation of medication with good effect.

## 2023-11-27 DIAGNOSIS — E78.5 HYPERLIPIDEMIA, UNSPECIFIED HYPERLIPIDEMIA TYPE: ICD-10-CM

## 2023-11-28 DIAGNOSIS — I10 HYPERTENSION, UNSPECIFIED TYPE: ICD-10-CM

## 2023-11-28 RX ORDER — IRBESARTAN 75 MG/1
75 TABLET ORAL DAILY
Qty: 90 TABLET | Refills: 3 | Status: SHIPPED | OUTPATIENT
Start: 2023-11-28

## 2023-11-28 RX ORDER — IRBESARTAN 150 MG/1
150 TABLET ORAL
Qty: 90 TABLET | Refills: 3 | Status: SHIPPED | OUTPATIENT
Start: 2023-11-28

## 2023-11-28 RX ORDER — ROSUVASTATIN CALCIUM 5 MG/1
5 TABLET, COATED ORAL EVERY OTHER DAY
Qty: 90 TABLET | Refills: 1 | Status: SHIPPED | OUTPATIENT
Start: 2023-11-28

## 2023-12-13 DIAGNOSIS — I10 HYPERTENSION, UNSPECIFIED TYPE: ICD-10-CM

## 2023-12-13 RX ORDER — IRBESARTAN 150 MG/1
150 TABLET ORAL
Qty: 90 TABLET | Refills: 0 | Status: SHIPPED | OUTPATIENT
Start: 2023-12-13

## 2024-01-09 ENCOUNTER — RA CDI HCC (OUTPATIENT)
Dept: OTHER | Facility: HOSPITAL | Age: 64
End: 2024-01-09

## 2024-01-18 ENCOUNTER — OFFICE VISIT (OUTPATIENT)
Dept: FAMILY MEDICINE CLINIC | Facility: CLINIC | Age: 64
End: 2024-01-18
Payer: COMMERCIAL

## 2024-01-18 VITALS
TEMPERATURE: 97 F | HEART RATE: 78 BPM | SYSTOLIC BLOOD PRESSURE: 124 MMHG | BODY MASS INDEX: 35.25 KG/M2 | OXYGEN SATURATION: 95 % | WEIGHT: 238 LBS | DIASTOLIC BLOOD PRESSURE: 74 MMHG | HEIGHT: 69 IN

## 2024-01-18 DIAGNOSIS — Z12.12 SCREENING FOR COLORECTAL CANCER: ICD-10-CM

## 2024-01-18 DIAGNOSIS — G95.9 MYELOPATHY (HCC): ICD-10-CM

## 2024-01-18 DIAGNOSIS — I10 PRIMARY HYPERTENSION: Primary | ICD-10-CM

## 2024-01-18 DIAGNOSIS — Z12.11 SCREENING FOR COLORECTAL CANCER: ICD-10-CM

## 2024-01-18 DIAGNOSIS — C61 PROSTATE CANCER (HCC): ICD-10-CM

## 2024-01-18 PROBLEM — R03.0 ELEVATED BP WITHOUT DIAGNOSIS OF HYPERTENSION: Status: RESOLVED | Noted: 2022-06-20 | Resolved: 2024-01-18

## 2024-01-18 PROCEDURE — 99214 OFFICE O/P EST MOD 30 MIN: CPT | Performed by: FAMILY MEDICINE

## 2024-01-18 NOTE — PROGRESS NOTES
"Assessment/Plan: No changes in medication at this time.  Annual lab testing ordered.  Recommend recheck again in 6 months.     1. Primary hypertension  -     CBC and differential  -     Comprehensive metabolic panel  -     Hepatic function panel  -     Lipid Panel with Direct LDL reflex    2. Prostate cancer (HCC)    3. Screening for colorectal cancer    4. Myelopathy (HCC)          Subjective:      Patient ID: [facility] Julio is a 63 y.o. male.    Patient is here for recheck on chronic conditions.  He has history of prostate cancer and is gradually recovering.  No evidence of recurrence.  Hypertension is under good control.  No recent changes in medication.             The following portions of the patient's history were reviewed and updated as appropriate: allergies, current medications, past family history, past medical history, past social history, past surgical history, and problem list.    Review of Systems   Constitutional: Negative.    HENT: Negative.     Eyes: Negative.    Respiratory: Negative.     Cardiovascular: Negative.    Gastrointestinal: Negative.    Endocrine: Negative.    Genitourinary: Negative.    Musculoskeletal: Negative.    Skin: Negative.    Allergic/Immunologic: Negative.    Neurological: Negative.    Hematological: Negative.    Psychiatric/Behavioral: Negative.           Objective:      /74 (BP Location: Left arm, Patient Position: Sitting, Cuff Size: Large)   Pulse 78   Temp (!) 97 °F (36.1 °C) (Tympanic)   Ht 5' 9\" (1.753 m)   Wt 108 kg (238 lb)   SpO2 95%   BMI 35.15 kg/m²          Physical Exam  Vitals reviewed.   Constitutional:       Appearance: He is well-developed.   HENT:      Head: Normocephalic and atraumatic.      Right Ear: External ear normal. Tympanic membrane is not erythematous or bulging.      Left Ear: External ear normal. Tympanic membrane is not erythematous or bulging.      Nose: Nose normal.      Mouth/Throat:      Mouth: No oral lesions.      Pharynx: No " oropharyngeal exudate.   Eyes:      General: No scleral icterus.        Right eye: No discharge.         Left eye: No discharge.      Conjunctiva/sclera: Conjunctivae normal.   Neck:      Thyroid: No thyromegaly.   Cardiovascular:      Rate and Rhythm: Normal rate and regular rhythm.      Heart sounds: Normal heart sounds. No murmur heard.     No friction rub. No gallop.   Pulmonary:      Effort: Pulmonary effort is normal. No respiratory distress.      Breath sounds: No wheezing or rales.   Chest:      Chest wall: No tenderness.   Abdominal:      General: Bowel sounds are normal. There is no distension.      Palpations: Abdomen is soft. There is no mass.      Tenderness: There is no abdominal tenderness. There is no guarding or rebound.   Musculoskeletal:         General: No tenderness or deformity. Normal range of motion.      Cervical back: Normal range of motion and neck supple.   Lymphadenopathy:      Cervical: No cervical adenopathy.   Skin:     General: Skin is warm and dry.      Coloration: Skin is not pale.      Findings: No erythema or rash.   Neurological:      Mental Status: He is alert and oriented to person, place, and time.      Cranial Nerves: No cranial nerve deficit.      Motor: No abnormal muscle tone.      Coordination: Coordination normal.      Deep Tendon Reflexes: Reflexes are normal and symmetric.   Psychiatric:         Behavior: Behavior normal.

## 2024-03-13 ENCOUNTER — NURSE TRIAGE (OUTPATIENT)
Age: 64
End: 2024-03-13

## 2024-03-13 DIAGNOSIS — Z12.5 SCREENING FOR PROSTATE CANCER: Primary | ICD-10-CM

## 2024-03-13 LAB
SL AMB % FREE PSA: NORMAL %
SL AMB PSA, FREE: <0.01 NG/ML
SL AMB PSA, TOTAL: <0.01 NG/ML

## 2024-03-13 NOTE — TELEPHONE ENCOUNTER
"Answer Assessment - Initial Assessment Questions  1. REASON FOR CALL or QUESTION: \"What is your reason for calling today?\" or \"How can I best help you?\" or \"What question do you have that I can help answer?\"      Pt's wife called in requesting a PSA order. Placed and faxed order to [facility] lab. She also wanted to relay a question for the provider. He has a history of prostate cancer but states that his PSA levels in the past were never elevated. She would like to know if there will be additional testing considering his normal PSA levels in the past. Please advise.    Protocols used: Information Only Call - No Triage-ADULT-[place]    "

## 2024-03-13 NOTE — TELEPHONE ENCOUNTER
As long as patient's PSA remains undetectable or <0.01 we do not do additional workup or imaging.  Should his PSA ever become detectable then he would need further work-up or imaging. Nothing at this time based on last PSA of <0.01 (9/15/2023)

## 2024-03-13 NOTE — TELEPHONE ENCOUNTER
Called and spoke with patients spouse Edith. Advised that no further testing or imaging is needed at this time as patients last PSA remained undetectable. If PSA levels would become detectable again, then would determine further workup at that time. Patient will have PSA completed as ordered prior to upcoming visit.

## 2024-04-03 ENCOUNTER — OFFICE VISIT (OUTPATIENT)
Dept: UROLOGY | Facility: CLINIC | Age: 64
End: 2024-04-03
Payer: COMMERCIAL

## 2024-04-03 VITALS
WEIGHT: 232 LBS | BODY MASS INDEX: 34.36 KG/M2 | SYSTOLIC BLOOD PRESSURE: 118 MMHG | HEART RATE: 72 BPM | DIASTOLIC BLOOD PRESSURE: 68 MMHG | HEIGHT: 69 IN | OXYGEN SATURATION: 98 %

## 2024-04-03 DIAGNOSIS — C61 PROSTATE CANCER (HCC): Primary | ICD-10-CM

## 2024-04-03 PROCEDURE — 99213 OFFICE O/P EST LOW 20 MIN: CPT | Performed by: UROLOGY

## 2024-04-03 NOTE — PROGRESS NOTES
ASSESSMENT:     63 y.o. male with [id number] Pamela 3+4=7 (negative SM) s/p RALP 8/11/22    PLAN:     PSA remains undetectable.  Next in 6 months then yearly.    Severe incontinence postop requiring referral for AUS implant (Janette) and recent revision in January.  Patient now doing beautifully with minimal incontinence.    Patient has not had any erectile function return (preop JORGE 13).  Continue Trimix injection.  Doing very well with 0.25 to 0.3 mL of injection.    --      UROLOGY FOLLOWUP NOTE     CHIEF COMPLAINT   Easton Julio is a 63 y.o. male with a complaint of   Chief Complaint   Patient presents with    Follow-up     6 month f/u    Prostate Cancer       History of Present Illness:     63 y.o. male followed by primary care team with low normal PSA.  Recent rectal exam was suspicious and so multiparametric MRI was ordered.  Returns PI-RADS level 4 lesion.  Patient denies a family history of prostate cancer.  Has no urinary symptoms of complaint.  Presents today for discussion.  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 Now s/p prostate biopsy.  Still having some hematospermia.    JORGE 13    Patient ultimately agreed to proceed to the operating room on 8/11/2022.  Robotic prostatectomy performed.  Patient had a difficult apical dissection and anastomosis which required bladder neck reconstruction on the left side.  Hospital stay was uncomplicated.  Patient did note some pericatheter urethral leak with spasms.  Unfortunately, he was readmitted August 21st with fevers and a Klebsiella UTI.  Antibiotic therapy was administered, inpatient cystogram performed which was negative, catheter removed.    Patient had an extremely difficult postoperative course with prolonged incontinence.  This began to affect the patient's mood and led to some significant coping.      At my suggestion, the patient was referred for discussion of continence surgery.  Ultimately underwent artificial urinary sphincter  placement.      No spontaneous erection recovery with PDE5-I. Now using Trimix 20/1/30 (0.25-0.3mL)    3/13/24  1:43 PM    PSA, Total  <4.00 ng/mL <0.01       Past Medical History:     Past Medical History:   Diagnosis Date    Anxiety     Cancer (HCC) 6/15/22    Prostatic    GERD (gastroesophageal reflux disease) 2001    HL (hearing loss)     B/L    Hyperlipidemia     Hypertension     Obesity     Prostate cancer (HCC)        PAST SURGICAL HISTORY:     Past Surgical History:   Procedure Laterality Date    COLONOSCOPY      CT CYSTOGRAM  08/24/2022    HEPATITIS B SURFACE ANTIGEN (HISTORICAL)  01/08/2024    MN BX PROSTATE STRTCTC SATURATION SAMPLING IMG GID N/A 06/08/2022    Procedure: TRANSPERINEAL MRI FUSION BIOPSY PROSTATE;  Surgeon: Silvio Connelly MD;  [facility];  Service: Urology    MN LAPS SURG [id number] RPBIC RAD W/NRV SPARING ROBOT N/A 08/11/2022    Procedure: ROBOTIC PROSTATECTOMY RADICAL , BLADDER NECK RECONSTRUCTION;  Surgeon: Matthew Jung MD;  Location: [facility];  Service: Urology    PROSTATE SURGERY  8/11/2022    Cancer    VARICOSE VEIN SURGERY      VASECTOMY      WISDOM TOOTH EXTRACTION         CURRENT MEDICATIONS:     Current Outpatient Medications   Medication Sig Dispense Refill    aspirin (ECOTRIN LOW STRENGTH) 81 mg EC tablet Take 81 mg by mouth daily      irbesartan (AVAPRO) 150 mg tablet Take 1 tablet (150 mg total) by mouth daily at bedtime 90 tablet 1    irbesartan (AVAPRO) 75 mg tablet Take 1 tablet (75 mg total) by mouth daily 90 tablet 1    Papav-Phentolamine-Alprostadil (PGE1 / PHENTOLAMINE / PAPAVERINE, STANDARD TRIMIX, 20 MCG / 1 MG / 30 MG INJECTION)       rosuvastatin (CRESTOR) 5 mg tablet Take 1 tablet (5 mg total) by mouth every other day 90 tablet 1    tadalafil (CIALIS) 5 MG tablet Take 1 tablet (5 mg total) by mouth in the morning 90 tablet 3    meloxicam (MOBIC) 15 mg tablet  (Patient not taking: Reported on 4/3/2024)      sildenafil (REVATIO) 20 mg tablet  TAKE ONE TABLET BY MOUTH once, one hour before intercourse on an empty stomach (Patient not taking: Reported on 2023) 30 tablet 3     No current facility-administered medications for this visit.       ALLERGIES:   No Known Allergies    SOCIAL HISTORY:     Social History     Socioeconomic History    Marital status: /Civil Union     Spouse name: None    Number of children: None    Years of education: None    Highest education level: None   Occupational History    None   Tobacco Use    Smoking status: Never    Smokeless tobacco: Never   Vaping Use    Vaping status: Never Used   Substance and Sexual Activity    Alcohol use: Yes     Alcohol/week: 1.0 standard drink of alcohol     Types: 1 Standard drinks or equivalent per week     Comment: Once a month or so    Drug use: Not Currently     Comment: College    Sexual activity: Not Currently     Partners: Female     Birth control/protection: Male Sterilization   Other Topics Concern    None   Social History Narrative    None     Social Determinants of Health     Financial Resource Strain: Not on file   Food Insecurity: No Food Insecurity (2022)    Hunger Vital Sign     Worried About Running Out of Food in the Last Year: Never true     Ran Out of Food in the Last Year: Never true   Transportation Needs: No Transportation Needs (2022)    PRAPARE - Transportation     Lack of Transportation (Medical): No     Lack of Transportation (Non-Medical): No   Physical Activity: Not on file   Stress: Not on file   Social Connections: Not on file   Intimate Partner Violence: Not on file   Housing Stability: Low Risk  (2022)    Housing Stability Vital Sign     Unable to Pay for Housing in the Last Year: No     Number of Places Lived in the Last Year: 1     Unstable Housing in the Last Year: No       SOCIAL HISTORY:     Family History   Problem Relation Age of Onset    Sickle cell trait Mother     Hypertension Mother     Stroke Mother              "Hypertension Father     Heart disease Father     Cardiomyopathy Brother     Alcohol abuse Brother     Substance Abuse Brother     Mental illness Brother     Bipolar disorder Brother     Psychiatric Illness Brother     Sickle cell trait Maternal Grandmother     Sickle cell trait Maternal Grandfather     Hypertension Paternal Grandfather        REVIEW OF SYSTEMS:     Review of Systems   Constitutional: Negative.    Respiratory: Negative.     Cardiovascular: Negative.    Gastrointestinal: Negative.    Genitourinary:  Positive for enuresis (minimal s/p AUS). Negative for scrotal swelling.   Musculoskeletal: Negative.    Skin: Negative.    Psychiatric/Behavioral: Negative.           PHYSICAL EXAM:     /68 (BP Location: Left arm, Patient Position: Sitting, Cuff Size: Large)   Pulse 72   Ht 5' 9\" (1.753 m)   Wt 105 kg (232 lb)   SpO2 98%   BMI 34.26 kg/m²     General:  Healthy appearing male in no acute distress.  They have a normal affect.  There is not appear to be any gross neurologic defects or abnormalities.  HEENT:  Normocephalic, atraumatic.  Neck is supple without any palpable lymphadenopathy  Cardiovascular:  Patient has normal palpable distal radial pulses.  There is no significant peripheral edema. No JVD is noted.  Respiratory:  Patient has unlabored respirations.  There is no audible wheeze or rhonchi.  Abdomen:  Abdomen is with healing robotic surgical scars.  Abdomen is soft and nontender.  There is no tympany.  Inguinal and umbilical hernia are not appreciated.  Musculoskeletal:  Patient does not have significant CVA tenderness in the  flank with palpation or percussion.  They full range of motion in all 4 extremities.  Strength in all 4 extremities appears congruent.  Patient is able to ambulate without assistance or difficulty.  Dermatologic:  Patient has no skin abnormalities or rashes.      LABS:     CBC:   Lab Results   Component Value Date    WBC 9.82 08/22/2022    HGB 13.0 08/22/2022    " HCT 38.8 2022    MCV 89 2022     2022       BMP:   Lab Results   Component Value Date    CALCIUM 9.6 2024    K 4.3 2024    CO2 26 2024     2024    BUN 20 2024    CREATININE 1.01 2024       9/15/23  1:57 PM     Prostate Specific Antigen <4.00 ng/mL <0.01          IMAGIN/13/22  MULTIPARAMETRIC MRI OF THE PROSTATE WITH AND WITHOUT CONTRAST-WITH 3-D POSTPROCESSING.     INDICATION:   R97.20: Elevated prostate specific antigen (PSA)  N40.3: Nodular prostate with lower urinary tract symptoms  N13.8: Other obstructive and reflux uropathy.     COMPARISON: None     PSA LEVEL: 2.6 ng/ml  2022.  PRIOR BIOPSY DATE: Unknown.  BIOPSY RESULTS: Unknown.     TECHNIQUE: The following pulse sequences were obtained:  Small field-of-view axial T1-weighted and multiplanar T2-weighted images; DWI axial and ADC map; large field of view axial T2 weighted images; T1w in-phase and opposed-phase axials of entire pelvis   and dynamic 3D T1w axial before and during IV contrast injection.  Imaging performed on 3.0T MRI      CONTRAST:  Gadobutrol (Gadavist) 9 mL of Gadobutrol injection (SINGLE-DOSE)     TECHNICAL LIMITATIONS: None.     FINDINGS:     PROSTATE:     Size: 5.1 x 4.4 x 4.6 cm = 49.7 cc.     Post-biopsy hemorrhage:  None.    Central gland enlargement (BPH): Mild.     Focal lesions -      Lesion: 1       Size: 1.2 x 0.8 x 0.9 cm, 0.49 cc.       Location: Right posterior medial peripheral zone at the apex       T2-weighted images: Score 3: Heterogeneous signal or noncircumscribed, rounded, moderate hypointensity; includes others that do not qualify as 2, 4 or 5.       Diffusion-weighted images: Score 4: Focal markedly hypointense on ADC and markedly hyperintense on high b-value DWI; less than 1.5 cm in greatest dimension.       Dynamic post-contrast images: (-) Lesion that does not enhance early compared to the surrounding prostate or enhances diffusely so that  the margins of the enhancing area do not correspond to a finding on T2-weighted images and/or DWI.  PI-RADS Assessment Category: 4, High (clinically significant cancer is likely to be present).  Extra-prostatic extension (EPE): Abuts capsule without visualized EPE.     SEMINAL VESICLES: Unremarkable     Note: Clinically significant cancer is defined on pathology/histology as Pamela score greater than or equal to 7, and/or volume of greater than or equal to 0.5 mL, and/or extraprostatic extension.     URINARY BLADDER: Unremarkable.     LYMPH NODES: No pelvic lymphadenopathy.     BONES: No suspicious osseous lesion.     There are bilateral fat-containing inguinal hernias.  There is diverticulosis coli.     IMPRESSION:     1. PI-RADSv2.1 Category 4 -High (clinically significant cancer is likely to be present). Right posterior medial peripheral zone at the apex.     2. No extraprostatic tumor, seminal vesicle invasion, pelvic lymphadenopathy, or pelvic osseous metastatic disease.     3. Calculated prostate volume of 49.7 cc.    PATHOLOGY:     6/8/22  Final Diagnosis   A. Prostate, RIGHT ANTERIOR MEDIAL needle biopsy:    -Benign prostatic tissue.         B. Prostate, RIGHT BASE, needle Biopsy:   - Prostatic adenocarcinoma, confirmed by triple stain   -Pamela grade 3 +4 = score  7  -Percentage of Grade 4:  10%  -Tumor involves two submitted cores  -Tumor  discontinuously involves approximately 10% , 80% of  each core biopsy  - Perineural invasion: Not identified        C. Prostate, RIGHT POSTERIOR MEDIAL, needle biopsy:    -Benign prostatic tissue.         D. Prostate, LEFT BASE, needle biopsy:    -Prostate tissue with small focus of atypical glands.         E. Prostate, LEFT POSTERIOR MEDIAL, needle biopsy:    -Benign prostatic tissue.   Note  Triple stain shows positive staining of myoepithelial cell layer by , P63. Racemase stain is negative.  Controls reacted appropriately      F. Prostate, LEFT POSTERIOR  LATERAL, needle biopsy:    -Prostate tissue with small focus of atypical glands.   Note  Triple stain shows focal absent staining of myoepithelial cell layer by , P63. Racemase stain is negative.  Controls reacted appropriately      G. Prostate, LEFT ANTERIOR LATERAL, needle biopsy:    -Benign prostatic tissue.         H. Prostate, LEFT ANTERIOR MEDIAL,  needle biopsy:    -Benign prostatic tissue.         I. Prostate, RIGHT POSTERIOR LATERAL, needle Biopsy:   - Prostatic adenocarcinoma, confirmed by triple stain   -Pamela grade 3 + 3 = score  6  -Tumor involves one of two submitted cores  -Tumor involves approximately 10% of  core biopsy  - Perineural invasion: Not identified        J. Prostate, RIGHT ANTERIOR LATERAL,  needle Biopsy:   - Prostatic adenocarcinoma, confirmed by triple stain   -Dimmitt grade 3 +4 = score  7  -Percentage of Grade 4:  10%  -Tumor involves two submitted cores  -Tumor involves approximately 20% , 20% of  each core biopsy  - Perineural invasion: Not identified        K. Prostate, Region of Interest,  needle Biopsy:   - Prostatic adenocarcinoma, confirmed by triple stain   -Dimmitt grade 3 +3 = score  6  -Tumor involves four of five submitted cores  -Tumor involves approximately 35% , 50%, 60%, 75%  of  each core biopsy  - Perineural invasion: Not identified     NOMOGRAM:       ASSESSMENT:     8/11/22  Final Diagnosis   A. Soft Tissue, cely-prostatic fat:  - Benign fibroadipose tissue.  - Negative for carcinoma.     B. Prostate, prostate and seminal vesicles:  - Prostatic adenocarcinoma, Pamela score 7 (3+4), grade group 2.  - Lymphovascular invasion is not identified.  - Extraprostatic extension is not identified.  - Inked margins, negative for carcinoma.   - Seminal vesicles, negative for carcinoma.   - Please see note and synoptic report.

## 2024-04-08 NOTE — TELEPHONE ENCOUNTER
Patient called to make sure his refill request went through his my chart. I let him know it went through and I changed the pharmacy from [facility] to [facility] like he requested due to it being a compounded medication.     It was also routed to PCP pod when it should have bee routed to Urology pod. So I am routing it to the correct destination.

## 2024-04-29 DIAGNOSIS — N52.31 ERECTILE DYSFUNCTION AFTER RADICAL PROSTATECTOMY: ICD-10-CM

## 2024-04-30 RX ORDER — TADALAFIL 5 MG/1
5 TABLET ORAL DAILY
Qty: 90 TABLET | Refills: 1 | Status: SHIPPED | OUTPATIENT
Start: 2024-04-30 | End: 2024-05-02

## 2024-05-01 DIAGNOSIS — N52.31 ERECTILE DYSFUNCTION AFTER RADICAL PROSTATECTOMY: ICD-10-CM

## 2024-05-02 RX ORDER — TADALAFIL 5 MG/1
5 TABLET ORAL EVERY MORNING
Qty: 90 TABLET | Refills: 3 | Status: SHIPPED | OUTPATIENT
Start: 2024-05-02

## 2024-07-12 ENCOUNTER — RA CDI HCC (OUTPATIENT)
Dept: OTHER | Facility: HOSPITAL | Age: 64
End: 2024-07-12

## 2024-07-12 NOTE — PROGRESS NOTES
"HCC coding opportunities          Chart Reviewed number of suggestions sent to Provider: 1     Patients Insurance        Commercial Insurance: [employer]       Please review if this is a \"History of\" or \"resolved\" condition , see below guidelines, and update the problem list as applicable     Per Coding guidelines:   If the primary site of the malignancy as previously excised or eradicated by treatment and the original primary site has not recurred and is no longer under treatment, code the previous primary site as \"personal (past) history of malignant neoplasm,\" using the appropriate subcategory code under Z85.0-Z85.85 Code any mention of current secondary sites.       If the patient is still under active treatment for malignancy of primary site, either radiation or chemotherapy, retain the code for malignancy of primary site. The patient would not be receiving radiation therapy or chemotherapy directed at the primary site unless further treatments were needed.   " Final Anesthesia Post-op Assessment    Patient: Haley NOLAN Eri  Procedure(s) Performed: RIGHT GREATER SAPHENOUS VEIN RADIOFREQUENCY ABLATION AND LEFT LEG STAB PHLEBECTOMYRIGHT GREATER SAPHENOUS VEIN RADIOFREQUENCY ABLATION AND LEFT LEG STAB PHLEBECTOMY  Anesthesia type: General    Vitals Value Taken Time   Temp 36.7 °C (98.1 °F) 02/23/22 1400   Pulse 77 02/23/22 1400   Resp 14 02/23/22 1400   SpO2 95 % 02/23/22 1400   /70 02/23/22 1400         Patient Location: Phase II  Post-op Vital Signs:stable  Level of Consciousness: participates in exam, answers questions appropriately, awake, alert and oriented  Respiratory Status: spontaneous ventilation  Cardiovascular blood pressure returned to baseline and stable  Hydration: euvolemic  Pain Management: adequately controlled  Nausea: None  Airway Patency:patent  Post-op Assessment: awake, alert, appropriately conversant, or baseline, no complications, patient tolerated procedure well with no complications and no evidence of recall      No complications documented.

## 2024-07-17 ENCOUNTER — OFFICE VISIT (OUTPATIENT)
Dept: FAMILY MEDICINE CLINIC | Facility: CLINIC | Age: 64
End: 2024-07-17
Payer: COMMERCIAL

## 2024-07-17 VITALS
BODY MASS INDEX: 35.34 KG/M2 | DIASTOLIC BLOOD PRESSURE: 68 MMHG | SYSTOLIC BLOOD PRESSURE: 120 MMHG | HEIGHT: 69 IN | HEART RATE: 82 BPM | TEMPERATURE: 99.3 F | WEIGHT: 238.6 LBS | OXYGEN SATURATION: 98 %

## 2024-07-17 DIAGNOSIS — D64.9 ANEMIA, UNSPECIFIED TYPE: ICD-10-CM

## 2024-07-17 DIAGNOSIS — K92.2 GASTROINTESTINAL HEMORRHAGE, UNSPECIFIED GASTROINTESTINAL HEMORRHAGE TYPE: Primary | ICD-10-CM

## 2024-07-17 PROCEDURE — 99215 OFFICE O/P EST HI 40 MIN: CPT | Performed by: FAMILY MEDICINE

## 2024-07-17 RX ORDER — PANTOPRAZOLE SODIUM 40 MG/1
40 TABLET, DELAYED RELEASE ORAL 2 TIMES DAILY
COMMUNITY

## 2024-07-17 NOTE — PROGRESS NOTES
"Assessment/Plan: Considering recurrent bleed and need for transfusion and recent hospitalization recommended ER evaluation for lab testing and observation.  Patient's wife will take him to the emergency room.  He is planning to go to [facility] emergency room.  Emergency room will be contacted.  Time spent counseling reviewing treatment plan coordinating care documentation and reviewing treatment plan was 45 minutes.     1. Gastrointestinal hemorrhage, unspecified gastrointestinal hemorrhage type  -     Transfer to other facility  2. Anemia, unspecified type        Subjective:      Patient ID: Easton Julio is a 63 y.o. male.    Patient is seen today for follow-up from GI bleed.  He was seen in the emergency room recently and admitted to hospital for GI bleed after endoscopy and biopsy.  He had significant bleed and anemia and needed blood transfusion.  He was doing well for 2 days up until this morning where he had lower GI bleed with bright red blood per rectum again.  He is feeling lightheaded.  Blood pressure is on the low end of normal.             The following portions of the patient's history were reviewed and updated as appropriate: allergies, current medications, past family history, past medical history, past social history, past surgical history, and problem list.    Review of Systems   Constitutional: Negative.    HENT: Negative.     Eyes: Negative.    Respiratory: Negative.     Cardiovascular: Negative.    Gastrointestinal: Negative.    Endocrine: Negative.    Genitourinary: Negative.    Musculoskeletal: Negative.    Skin: Negative.    Allergic/Immunologic: Negative.    Neurological:  Positive for light-headedness.   Hematological: Negative.    Psychiatric/Behavioral: Negative.           Objective:      /68 (BP Location: Right arm)   Pulse 82   Temp 99.3 °F (37.4 °C) (Tympanic)   Ht 5' 9\" (1.753 m)   Wt 108 kg (238 lb 9.6 oz)   SpO2 98%   BMI 35.24 kg/m²          Physical Exam  Vitals " reviewed.   Constitutional:       Appearance: He is well-developed.   HENT:      Head: Normocephalic and atraumatic.      Right Ear: External ear normal. Tympanic membrane is not erythematous or bulging.      Left Ear: External ear normal. Tympanic membrane is not erythematous or bulging.      Nose: Nose normal.      Mouth/Throat:      Mouth: No oral lesions.      Pharynx: No oropharyngeal exudate.   Eyes:      General: No scleral icterus.        Right eye: No discharge.         Left eye: No discharge.      Conjunctiva/sclera: Conjunctivae normal.   Neck:      Thyroid: No thyromegaly.   Cardiovascular:      Rate and Rhythm: Normal rate and regular rhythm.      Heart sounds: Normal heart sounds. No murmur heard.     No friction rub. No gallop.   Pulmonary:      Effort: Pulmonary effort is normal. No respiratory distress.      Breath sounds: No wheezing or rales.   Chest:      Chest wall: No tenderness.   Abdominal:      General: Bowel sounds are normal. There is no distension.      Palpations: Abdomen is soft. There is no mass.      Tenderness: There is no abdominal tenderness. There is no guarding or rebound.   Musculoskeletal:         General: No tenderness or deformity. Normal range of motion.      Cervical back: Normal range of motion and neck supple.   Lymphadenopathy:      Cervical: No cervical adenopathy.   Skin:     General: Skin is warm and dry.      Coloration: Skin is not pale.      Findings: No erythema or rash.   Neurological:      Mental Status: He is alert and oriented to person, place, and time.      Cranial Nerves: No cranial nerve deficit.      Motor: No abnormal muscle tone.      Coordination: Coordination normal.      Deep Tendon Reflexes: Reflexes are normal and symmetric.   Psychiatric:         Behavior: Behavior normal.

## 2024-07-18 ENCOUNTER — TELEPHONE (OUTPATIENT)
Dept: FAMILY MEDICINE CLINIC | Facility: CLINIC | Age: 64
End: 2024-07-18

## 2024-07-18 ENCOUNTER — TELEPHONE (OUTPATIENT)
Age: 64
End: 2024-07-18

## 2024-07-18 DIAGNOSIS — D64.9 ANEMIA, UNSPECIFIED TYPE: Primary | ICD-10-CM

## 2024-07-18 NOTE — TELEPHONE ENCOUNTER
Calling for CBC order again. Warm transfer to office to have her request this from Dr. Wei. Please fax order ASAP- [phone] [facility]

## 2024-07-18 NOTE — TELEPHONE ENCOUNTER
The patient called the office asking if the provider can place in another CBC order so he could check his levels. Patients states he is still bleeding at this time. Please review and advise

## 2024-07-19 ENCOUNTER — TELEPHONE (OUTPATIENT)
Age: 64
End: 2024-07-19

## 2024-07-19 LAB
BASOPHILS # BLD AUTO: 0 THOU/CMM (ref 0–0.1)
BASOPHILS NFR BLD AUTO: 0 %
DIFFERENTIAL METHOD BLD: ABNORMAL
EOSINOPHIL # BLD AUTO: 0.2 THOU/CMM (ref 0–0.5)
EOSINOPHIL NFR BLD AUTO: 4 %
ERYTHROCYTE [DISTWIDTH] IN BLOOD BY AUTOMATED COUNT: 14.3 % (ref 12–16)
HCT VFR BLD AUTO: 26.6 % (ref 37–48)
HGB BLD-MCNC: 9.1 G/DL (ref 12.5–17)
LYMPHOCYTES # BLD AUTO: 1.1 THOU/CMM (ref 1–3)
LYMPHOCYTES NFR BLD AUTO: 18 %
MCH RBC QN AUTO: 30.6 PG (ref 27–36)
MCHC RBC AUTO-ENTMCNC: 34.1 G/DL (ref 32–37)
MCV RBC AUTO: 90 FL (ref 80–100)
METAMYELOCYTES NFR BLD MANUAL: 2 %
MONOCYTES # BLD AUTO: 0.2 THOU/CMM (ref 0.3–1)
MONOCYTES NFR BLD AUTO: 3 %
MORPHOLOGY BLD-IMP: ABNORMAL
MYELOCYTES NFR BLD MANUAL: 2 %
NEUTROPHILS # BLD AUTO: 4.5 THOU/CMM (ref 1.8–7.8)
NEUTROPHILS NFR BLD AUTO: 69 %
NEUTS BAND NFR BLD MANUAL: 2 % (ref 0–6)
PLATELET # BLD AUTO: 206 THOU/CMM (ref 140–350)
PMV BLD REES-ECKER: 9 FL (ref 7.5–11.3)
POLYCHROMASIA BLD QL SMEAR: SLIGHT
RBC # BLD AUTO: 2.97 MILL/CMM (ref 4–5.4)
TARGETS BLD QL SMEAR: ABNORMAL
WBC # BLD AUTO: 6 THOU/CMM (ref 4–10.5)

## 2024-07-19 NOTE — TELEPHONE ENCOUNTER
Printed and faxed to the following number. Called and spoke with patient and informed. No further questions

## 2024-07-19 NOTE — TELEPHONE ENCOUNTER
Nurse from [facility] called in inform the PCP that patient was admitted in the hospital from 7/14 till 7/16. Kindly call back the patient to schedule his TCM. Thanks

## 2024-07-22 ENCOUNTER — OFFICE VISIT (OUTPATIENT)
Dept: FAMILY MEDICINE CLINIC | Facility: CLINIC | Age: 64
End: 2024-07-22
Payer: COMMERCIAL

## 2024-07-22 VITALS
HEART RATE: 72 BPM | SYSTOLIC BLOOD PRESSURE: 120 MMHG | DIASTOLIC BLOOD PRESSURE: 66 MMHG | HEIGHT: 69 IN | BODY MASS INDEX: 35.58 KG/M2 | TEMPERATURE: 97.3 F | WEIGHT: 240.2 LBS | OXYGEN SATURATION: 99 %

## 2024-07-22 DIAGNOSIS — K92.2 GASTROINTESTINAL HEMORRHAGE, UNSPECIFIED GASTROINTESTINAL HEMORRHAGE TYPE: ICD-10-CM

## 2024-07-22 DIAGNOSIS — D64.9 ANEMIA, UNSPECIFIED TYPE: Primary | ICD-10-CM

## 2024-07-22 PROCEDURE — 99213 OFFICE O/P EST LOW 20 MIN: CPT | Performed by: FAMILY MEDICINE

## 2024-07-22 NOTE — PROGRESS NOTES
"Assessment/Plan: Recommend rechecking CBC again in 2 weeks.  Patient will call with any new persisting or worsening symptoms.  Recommend good hydration.  Follow-up with gastroenterology as scheduled.     1. Anemia, unspecified type  -     CBC and differential  2. Gastrointestinal hemorrhage, unspecified gastrointestinal hemorrhage type        Subjective:      Patient ID: Easton Julio is a 63 y.o. male.    Patient had recent GI bleed.  Symptoms have been stable for 4 days now.  No subsequent bleeding.  Patient is anemic secondary to the bleed and last hemoglobin was 9.1.             The following portions of the patient's history were reviewed and updated as appropriate: allergies, current medications, past family history, past medical history, past social history, past surgical history, and problem list.    Review of Systems   Constitutional: Negative.    HENT: Negative.     Eyes: Negative.    Respiratory: Negative.     Cardiovascular: Negative.    Gastrointestinal: Negative.    Endocrine: Negative.    Genitourinary: Negative.    Musculoskeletal: Negative.    Skin: Negative.    Allergic/Immunologic: Negative.    Neurological: Negative.    Hematological: Negative.    Psychiatric/Behavioral: Negative.           Objective:      /66 (BP Location: Right arm)   Pulse 72   Temp (!) 97.3 °F (36.3 °C) (Tympanic)   Ht 5' 9\" (1.753 m)   Wt 109 kg (240 lb 3.2 oz)   SpO2 99%   BMI 35.47 kg/m²          Physical Exam    "

## 2024-07-26 DIAGNOSIS — I10 HYPERTENSION, UNSPECIFIED TYPE: ICD-10-CM

## 2024-07-26 RX ORDER — IRBESARTAN 150 MG/1
150 TABLET ORAL
Qty: 100 TABLET | Refills: 1 | Status: SHIPPED | OUTPATIENT
Start: 2024-07-26

## 2024-07-30 LAB
BASOPHILS # BLD AUTO: 0.1 THOU/CMM (ref 0–0.1)
BASOPHILS NFR BLD AUTO: 1 %
DIFFERENTIAL METHOD BLD: ABNORMAL
EOSINOPHIL # BLD AUTO: 0.1 THOU/CMM (ref 0–0.5)
EOSINOPHIL NFR BLD AUTO: 2 %
ERYTHROCYTE [DISTWIDTH] IN BLOOD BY AUTOMATED COUNT: 15.8 % (ref 12–16)
HCT VFR BLD AUTO: 32.4 % (ref 37–48)
HGB BLD-MCNC: 10.8 G/DL (ref 12.5–17)
LYMPHOCYTES # BLD AUTO: 1 THOU/CMM (ref 1–3)
LYMPHOCYTES NFR BLD AUTO: 17 %
MCH RBC QN AUTO: 29.3 PG (ref 27–36)
MCHC RBC AUTO-ENTMCNC: 33.2 G/DL (ref 32–37)
MCV RBC AUTO: 88 FL (ref 80–100)
MONOCYTES # BLD AUTO: 0.5 THOU/CMM (ref 0.3–1)
MONOCYTES NFR BLD AUTO: 9 %
NEUTROPHILS # BLD AUTO: 4.3 THOU/CMM (ref 1.8–7.8)
NEUTROPHILS NFR BLD AUTO: 71 %
PLATELET # BLD AUTO: 347 THOU/CMM (ref 140–350)
PMV BLD REES-ECKER: 8.2 FL (ref 7.5–11.3)
RBC # BLD AUTO: 3.67 MILL/CMM (ref 4–5.4)
WBC # BLD AUTO: 6.1 THOU/CMM (ref 4–10.5)

## 2024-10-18 NOTE — TELEPHONE ENCOUNTER
Patient needs an in person visit for teaching session. Per guidelines, they are supposed to be monitored with first injection. No

## 2024-10-24 ENCOUNTER — OFFICE VISIT (OUTPATIENT)
Dept: UROLOGY | Facility: CLINIC | Age: 64
End: 2024-10-24
Payer: COMMERCIAL

## 2024-10-24 VITALS
HEART RATE: 90 BPM | WEIGHT: 240 LBS | BODY MASS INDEX: 35.55 KG/M2 | DIASTOLIC BLOOD PRESSURE: 62 MMHG | OXYGEN SATURATION: 94 % | HEIGHT: 69 IN | SYSTOLIC BLOOD PRESSURE: 110 MMHG

## 2024-10-24 DIAGNOSIS — N39.3 STRESS INCONTINENCE OF URINE: ICD-10-CM

## 2024-10-24 DIAGNOSIS — N52.31 ERECTILE DYSFUNCTION AFTER RADICAL PROSTATECTOMY: ICD-10-CM

## 2024-10-24 DIAGNOSIS — C61 PROSTATE CANCER (HCC): Primary | ICD-10-CM

## 2024-10-24 PROCEDURE — 99213 OFFICE O/P EST LOW 20 MIN: CPT | Performed by: PHYSICIAN ASSISTANT

## 2024-10-24 NOTE — PROGRESS NOTES
Ambulatory Visit  Name: Easton Julio      : 1960      MRN: [id number]  Encounter Provider: Tamra Rasmussen PA-C  Encounter Date: 10/24/2024   Encounter department: [facility] UROLOGY [facility]    Assessment & Plan  Prostate cancer (HCC)  [id number] [place] 3+4=7 (negative SM) s/p RALP 2022  PSA <0.1 (10/2024) in care everywhere review  Orders:    PSA Total, Diagnostic; Future    PSA Total, Diagnostic    Erectile dysfunction after radical prostatectomy  trimix 30 @ 0.25ml ICI       Stress incontinence of urine  s/p artificial urinary sphincter insertion 2023, revised 2024; happy with final result         History of Present Illness     Easton Julio is a 64 y.o. male who presents 6-month follow-up prostate cancer he is status post radical prostatectomy [id number] PSAs.  He did have persistent postoperative stress urinary continence he was diligent with pelvic floor physical therapy, he saw Dr. Savage for artificial urinary sphincter 2023, revised 2024 now doing beautifully with minimal incontinence.  Does have some positional urgency no troubles manipulating his device. He is using intracavernosal Trimix for erectile dysfunction 30 concentration around 0.25 mL.    History obtained from : patient  Review of Systems   Constitutional: Negative.    Respiratory: Negative.     Cardiovascular: Negative.    Genitourinary:  Negative for decreased urine volume, difficulty urinating, dysuria, flank pain, frequency, hematuria and urgency.   Musculoskeletal: Negative.            AUA SYMPTOM SCORE      Flowsheet Row Most Recent Value   AUA SYMPTOM SCORE    How often have you had a sensation of not emptying your bladder completely after you finished urinating? 0 (P)     How often have you had to urinate again less than two hours after you finished urinating? 2 (P)     How often have you found you stopped and started again several times when you urinate? 0  "(P)     How often have you found it difficult to postpone urination? 4 (P)     How often have you had a weak urinary stream? 0 (P)     How often have you had to push or strain to begin urination? 0 (P)     How many times did you most typically get up to urinate from the time you went to bed at night until the time you got up in the morning? 0 (P)     Quality of Life: If you were to spend the rest of your life with your urinary condition just the way it is now, how would you feel about that? 2 (P)     AUA SYMPTOM SCORE 6 (P)            Objective     /62 (BP Location: Left arm, Patient Position: Sitting, Cuff Size: Adult)   Pulse 90   Ht 5' 9\" (1.753 m)   Wt 109 kg (240 lb)   SpO2 94%   BMI 35.44 kg/m²   Physical Exam  Vitals and nursing note reviewed.   Constitutional:       General: He is not in acute distress.     Appearance: He is well-developed. He is not diaphoretic.   HENT:      Head: Normocephalic and atraumatic.   Pulmonary:      Effort: Pulmonary effort is normal.   Musculoskeletal:      Right lower leg: No edema.      Left lower leg: No edema.   Skin:     General: Skin is warm.   Neurological:      Mental Status: He is alert and oriented to person, place, and time.       Results  Lab Results   Component Value Date    PSA <0.01 03/13/2024     Lab Results   Component Value Date    CALCIUM 8.1 (L) 07/17/2024    K 3.5 07/17/2024    CO2 26 07/17/2024     07/17/2024    BUN 13 07/17/2024    CREATININE 0.95 07/17/2024     Lab Results   Component Value Date    WBC 6.1 07/30/2024    HGB 10.8 (L) 07/30/2024    HCT 32.4 (L) 07/30/2024    MCV 88 07/30/2024     07/30/2024       Office Urine Dip  No results found for this or any previous visit (from the past 1 hour(s)).]    Administrative Statements         "

## 2024-10-24 NOTE — ASSESSMENT & PLAN NOTE
[id number] [place] 3+4=7 (negative SM) s/p RALP 8/11/2022  PSA <0.1 (10/2024) in care everywhere review  Orders:    PSA Total, Diagnostic; Future    PSA Total, Diagnostic

## 2024-11-07 ENCOUNTER — PATIENT MESSAGE (OUTPATIENT)
Dept: UROLOGY | Facility: CLINIC | Age: 64
End: 2024-11-07

## 2024-11-15 DIAGNOSIS — C61 PROSTATE CANCER (HCC): Primary | ICD-10-CM

## 2024-12-03 NOTE — ASSESSMENT & PLAN NOTE
· Status post prostatectomy; awaiting further urology opinion  Products Recommended: Recommended Sunscreen Lines:\\n\\nFor the face:\\nEltaMD\\nRevision\\nColor Science\\nSupergoop Unseen\\nLa Roche Posay\\nCeraVe Ultralight\\n\\nFor the body:\\nElta Sport\\nLa Roche Posay\\nNeutrogena\\nCoppertone\\n Detail Level: Generalized General Sunscreen Counseling: I recommended a broad spectrum sunscreen with a SPF of 30 or higher.  I explained that SPF 30 sunscreens block approximately 97 percent of the sun's harmful rays.  Sunscreens should be applied at least 15 minutes prior to expected sun exposure and then every 2 hours after that as long as sun exposure continues. If swimming or exercising sunscreen should be reapplied every 45 minutes to an hour after getting wet or sweating.  Lotion/cream sunscreens are preferred and superior to spray sunscreens.  One ounce, or the equivalent of a shot glass full of sunscreen, is adequate to protect the skin not covered by a bathing suit. I also recommended a lip balm with a sunscreen as well. Sun protective clothing can be used in lieu of sunscreen but must be worn the entire time you are exposed to the sun's rays.

## 2024-12-09 ENCOUNTER — DOCUMENTATION (OUTPATIENT)
Dept: GENETICS | Facility: CLINIC | Age: 64
End: 2024-12-09

## 2024-12-10 NOTE — PROGRESS NOTES
Pre-Test Genetic Counseling Consult Note    Patient Name: Easton Julio   /Age: 1960/64 y.o.  Referring Provider:  Tamra Rasmussen PA-C    Date of Service: 2024  Genetic Counselor: Clemencia Lazaro [place], [facility]  Interpretation Services: None  Location: Telephone consult   Length of Visit: 60 Minutes    Easton was referred to the [facility] Cancer Risk and Genetic Assessment Program due to his personal history of prostate cancer . he presents today to discuss the possibility of a hereditary cancer syndrome, options for genetic testing, and implications for him and his family. His wife (Edith) accompanied him to the appointment.        Cancer History and Treatment:   Personal History:   Oncology History    No history exists.     Easton has a personal history of prostate cancer at age 62 status post radical prostatectomy in 2022.    Surgical Pathology Report 22    Final Diagnosis   A. Prostate, RIGHT ANTERIOR MEDIAL needle biopsy:    -Benign prostatic tissue.         B. Prostate, RIGHT BASE, needle Biopsy:   - Prostatic adenocarcinoma, confirmed by triple stain   -Pamela grade 3 +4 = score  7  -Percentage of Grade 4:  10%  -Tumor involves two submitted cores  -Tumor  discontinuously involves approximately 10% , 80% of  each core biopsy  - Perineural invasion: Not identified        C. Prostate, RIGHT POSTERIOR MEDIAL, needle biopsy:    -Benign prostatic tissue.         D. Prostate, LEFT BASE, needle biopsy:    -Prostate tissue with small focus of atypical glands.         E. Prostate, LEFT POSTERIOR MEDIAL, needle biopsy:    -Benign prostatic tissue.   Note  Triple stain shows positive staining of myoepithelial cell layer by , P63. Racemase stain is negative.  Controls reacted appropriately      F. Prostate, LEFT POSTERIOR LATERAL, needle biopsy:    -Prostate tissue with small focus of atypical glands.   Note  Triple stain shows focal absent staining of myoepithelial cell layer by , P63.  Racemase stain is negative.  Controls reacted appropriately      G. Prostate, LEFT ANTERIOR LATERAL, needle biopsy:    -Benign prostatic tissue.         H. Prostate, LEFT ANTERIOR MEDIAL,  needle biopsy:    -Benign prostatic tissue.         I. Prostate, RIGHT POSTERIOR LATERAL, needle Biopsy:   - Prostatic adenocarcinoma, confirmed by triple stain   -[place] grade 3 + 3 = score  6  -Tumor involves one of two submitted cores  -Tumor involves approximately 10% of  core biopsy  - Perineural invasion: Not identified        J. Prostate, RIGHT ANTERIOR LATERAL,  needle Biopsy:   - Prostatic adenocarcinoma, confirmed by triple stain   -[place] grade 3 +4 = score  7  -Percentage of Grade 4:  10%  -Tumor involves two submitted cores  -Tumor involves approximately 20% , 20% of  each core biopsy  - Perineural invasion: Not identified        K. Prostate, Region of Interest,  needle Biopsy:   - Prostatic adenocarcinoma, confirmed by triple stain   -[place] grade 3 +3 = score  6  -Tumor involves four of five submitted cores  -Tumor involves approximately 35% , 50%, 60%, 75%  of  each core biopsy  - Perineural invasion: Not identified     Surgical Pathology Report 8/11/22    Final Diagnosis   A. Soft Tissue, cely-prostatic fat:  - Benign fibroadipose tissue.  - Negative for carcinoma.     B. Prostate, prostate and seminal vesicles:  - Prostatic adenocarcinoma, Pamela score 7 (3+4), grade group 2.  - Lymphovascular invasion is not identified.  - Extraprostatic extension is not identified.  - Inked margins, negative for carcinoma.   - Seminal vesicles, negative for carcinoma.   - Please see note and synoptic report.      Screening Hx:   Colon:  Colonoscopy: 12/5/2019        No polyps reported on previous colonoscopies per patient     Skin:  Sees derm annually    Prostate:  Prostate screening:     Component  Ref Range & Units (hover) 3/13/24  1:43 PM   PSA, Total <0.01   Comment: Testing performed using [employer] DxI. Results  "obtained from   different manufacturers and methods cannot be used interchangeably.   PSA, Free <0.01   Comment: Testing performed using [employer] DxI. Results obtained from   different manufacturers and methods cannot be used interchangeably.     Other screening: None    Medical and Surgical History  Pertinent surgical history:   Past Surgical History:   Procedure Laterality Date    COLONOSCOPY      CT CYSTOGRAM  08/24/2022    FL LUMBAR PUNCTURE DIAGNOSTIC  2/24/2017    HEPATITIS B SURFACE ANTIGEN (HISTORICAL)  01/08/2024    AK BX PROSTATE STRTCTC SATURATION SAMPLING IMG GID N/A 06/08/2022    Procedure: TRANSPERINEAL MRI FUSION BIOPSY PROSTATE;  Surgeon: Silvio Connelly MD;  [facility];  Service: Urology    AK LAPS SURG [id number] RPBIC RAD W/NRV SPARING ROBOT N/A 08/11/2022    Procedure: ROBOTIC PROSTATECTOMY RADICAL , BLADDER NECK RECONSTRUCTION;  Surgeon: Matthew Jung MD;  Location: [facility];  Service: Urology    PROSTATE SURGERY  8/11/2022    Cancer    VARICOSE VEIN SURGERY      VASECTOMY      WISDOM TOOTH EXTRACTION        Pertinent medical history:  Past Medical History:   Diagnosis Date    Anxiety     Cancer (HCC) 6/15/22    Prostatic    GERD (gastroesophageal reflux disease) 2001    GI bleed 07/14/2024    HL (hearing loss)     B/L    Hyperlipidemia     Hypertension     Obesity     Prostate cancer (HCC)        Other History:  Height:   Ht Readings from Last 1 Encounters:   10/24/24 5' 9\" (1.753 m)     Weight:   Wt Readings from Last 1 Encounters:   10/24/24 109 kg (240 lb)     Relevant Family History   Patient reports no [employer]ken[employer] Church ancestry.         Please refer to the scanned pedigree in the Media Tab for a complete family history     *All history is reported as provided by the patient; records are not available for review, except where indicated.     Assessment:  We discussed sporadic, familial and hereditary cancer.  We also discussed the many factors that influence our " risk for cancer such as age, environmental exposures, lifestyle choices and family history.      We reviewed the indications suggestive of a hereditary predisposition to cancer.    Genetic testing is indicated for Easton based on the following criteria: Meets NCCN V2.2025 Testing Criteria for Prostate Cancer Susceptibility Genes: Easton has a personal history of prostate cancer and >=1 close blood relatives (two maternal great-aunts) with pancreatic cancer    The risks, benefits, and limitations of genetic testing were reviewed with the patient, as well as genetic discrimination laws, and possible test results (positive, negative, variants of uncertain significance) and their clinical implications. If positive for a mutation, options for managing cancer risk including increased surveillance, chemoprevention, and in some cases prophylactic surgery were discussed. Easton was informed that if a hereditary cancer syndrome was identified in him, first degree relatives (parents, siblings, and children) have a chance of also inheriting the condition. Genetic testing would allow for predictive genetic testing in other relatives, who may also be at risk depending on their degree of relation.     Billing:  Most individuals pay <$100 for hereditary cancer genetic testing. If insurance covers the cost of the testing, individuals may still pay out of pocket secondary to co-pays, co-insurance, or deductibles. If the cost of the testing exceeds $100, the lab will reach out to the patient via phone or e-mail. The patient will then have the option to proceed with the testing, cancel the testing, or elect the self-pay option of $250. Easton verbalized understanding.     Plan: Patient decided to proceed with testing and provided consent.    Summary:     Sample Collection:  An order was placed and the patient was instructed to go to a [facility] lab for a blood collection    Genetic Testing Preformed: CustomNext: Cancer® +RNAinsight® (59  genes): APC, JOHN, AXIN2, BAP1, BARD1, BMPR1A, BRCA1, BRCA2, BRIP1, CDH1, CDK4, CDKN1B, CDKN2A, CHEK2, CTNNA1, DICER1, EGLN1, EPCAM, FH, FLCN, GREM1, HOXB13, KIF1B, KIT, MAX, MEN1, MET, MITF, MLH1, MSH2, MSH3, MSH6, MUTYH, NF1, NTHL1, PALB2, PDGFRA PMS2, POLD1, POLE, POT1, PTEN, RAD51C, RAD51D, RB1, RET, SDHA, SDHAF2, SDHB, SDHC, SDHD, SMAD4, SMARCA4, STK11, HNNA291, TP53, TSC1, TSC2, VHL     Result Call Information:  In the event that we need to reach Easton via telephone:  I confirmed the patient's mobile number on file as the best number to call with results  I confirmed with the patient that we can leave a voicemail on the provided numbers    Results take approximately 2-3 weeks to complete once test is started.    Easton will be notified via [employer] once results are available.      Additional recommendations for surveillance/medical management will be made pending genetic test results.

## 2024-12-11 ENCOUNTER — OFFICE VISIT (OUTPATIENT)
Dept: GENETICS | Facility: CLINIC | Age: 64
End: 2024-12-11
Payer: COMMERCIAL

## 2024-12-11 DIAGNOSIS — Z80.0 FAMILY HISTORY OF PANCREATIC CANCER: ICD-10-CM

## 2024-12-11 DIAGNOSIS — C61 PROSTATE CANCER (HCC): ICD-10-CM

## 2024-12-11 PROCEDURE — 96040 PR MEDICAL GENETICS COUNSELING EACH 30 MINUTES: CPT

## 2024-12-23 ENCOUNTER — APPOINTMENT (OUTPATIENT)
Dept: LAB | Facility: CLINIC | Age: 64
End: 2024-12-23
Payer: COMMERCIAL

## 2024-12-23 DIAGNOSIS — Z80.0 FAMILY HISTORY OF PANCREATIC CANCER: ICD-10-CM

## 2024-12-23 DIAGNOSIS — C61 PROSTATE CANCER (HCC): ICD-10-CM

## 2024-12-23 PROCEDURE — 36415 COLL VENOUS BLD VENIPUNCTURE: CPT

## 2025-01-13 LAB
GENE DIS ANL INTERP-IMP: NORMAL
INTERPRETATION: NORMAL

## 2025-01-14 ENCOUNTER — TELEPHONE (OUTPATIENT)
Dept: GENETICS | Facility: CLINIC | Age: 65
End: 2025-01-14

## 2025-01-14 NOTE — TELEPHONE ENCOUNTER
Post-Test Genetic Counseling Consult Note    Today I spoke with Easton over the phone to review the results of his genetic test for hereditary cancer. We met previously on 12/11/24 for pre-test counseling.  A copy of this consult note and genetic test result will be shared with the patient.      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 The significance of the MLH1 variant is currently not known and therefore this test result cannot be used to help determine Easton cancer risks.      It is possible that the variant was seen in only a handful of individuals, or there may be conflicting or incomplete information in the medical literature about the variant and its association with hereditary cancer.     Risk Based on Family History:  The significance of this variant is currently not known and therefore this test result cannot be used to help determine Easton's cancer risks. Rather his personal medical history and family history of cancer are the most important factors used to estimate his risk for developing certain cancers and to direct his medical management.      Easton may still be at increased risk for cancer based on the family history. Therefore, Easton should continue to  follow with his healthcare providers to ensure that he is receiving the appropriate screening.      Risks and Testing for Family Members:  Genetic testing for this variant is not recommended for relatives who wish to determine their cancer risks for purposes of determining medical management. The presence or absence of this variant in a relative is not clinically meaningful unless the variant is reclassified in the future.     The laboratory will continue to accumulate information on this variant and will reclassify it as either a positive or negative genetic test result when they are confident that they have adequate information. As updated information is obtained, we will notify Easton with the updated information. It is important to note that the majority of variants of uncertain significance are reclassified as likely benign or benign as additional information about the variant becomes available.     Despite this result, Easton's first-degree relatives may be at increased risk for the cancers based on the family history. We recommend they discuss screening and management recommendations with their healthcare providers.    If Easton has any affected family members with a cancer diagnosis, especially at a young age, they may still consider genetic testing. Relatives who wish to pursue genetic testing can reach out to the [facility] [phone] to schedule an appointment or visit [email or web address] to identify a local genetic counselor.      Plan:   There are no additional recommendations based on Easton's result. he should continue cancer screening and medical management as clinically indicated and as determined appropriate by his healthcare providers.    VUS Result: Easton was strongly encouraged to contact us regarding any changes in his personal or family history of cancer as these changes could alter our recommendation regarding genetic testing and/or cancer screening. Easton was also encouraged  to follow up with us on an annual basis as variant classifications are subject to change.

## 2025-04-18 LAB — SL AMB PSA, TOTAL: <0.01 NG/ML

## 2025-04-24 ENCOUNTER — TELEPHONE (OUTPATIENT)
Age: 65
End: 2025-04-24

## 2025-04-24 ENCOUNTER — OFFICE VISIT (OUTPATIENT)
Dept: UROLOGY | Facility: CLINIC | Age: 65
End: 2025-04-24
Payer: COMMERCIAL

## 2025-04-24 ENCOUNTER — OFFICE VISIT (OUTPATIENT)
Dept: FAMILY MEDICINE CLINIC | Facility: CLINIC | Age: 65
End: 2025-04-24
Payer: COMMERCIAL

## 2025-04-24 VITALS
TEMPERATURE: 98.2 F | WEIGHT: 242 LBS | HEART RATE: 72 BPM | OXYGEN SATURATION: 97 % | SYSTOLIC BLOOD PRESSURE: 118 MMHG | BODY MASS INDEX: 34.65 KG/M2 | HEIGHT: 70 IN | DIASTOLIC BLOOD PRESSURE: 80 MMHG

## 2025-04-24 VITALS
HEART RATE: 89 BPM | OXYGEN SATURATION: 97 % | WEIGHT: 242 LBS | HEIGHT: 69 IN | BODY MASS INDEX: 35.84 KG/M2 | SYSTOLIC BLOOD PRESSURE: 100 MMHG | DIASTOLIC BLOOD PRESSURE: 68 MMHG

## 2025-04-24 DIAGNOSIS — C61 PROSTATE CANCER (HCC): ICD-10-CM

## 2025-04-24 DIAGNOSIS — I83.893 SYMPTOMATIC VARICOSE VEINS, BILATERAL: ICD-10-CM

## 2025-04-24 DIAGNOSIS — N52.31 ERECTILE DYSFUNCTION AFTER RADICAL PROSTATECTOMY: ICD-10-CM

## 2025-04-24 DIAGNOSIS — Z13.1 SCREENING FOR DIABETES MELLITUS: ICD-10-CM

## 2025-04-24 DIAGNOSIS — I10 PRIMARY HYPERTENSION: Primary | ICD-10-CM

## 2025-04-24 DIAGNOSIS — C61 PROSTATE CANCER (HCC): Primary | ICD-10-CM

## 2025-04-24 DIAGNOSIS — I47.10 SVT (SUPRAVENTRICULAR TACHYCARDIA) (HCC): ICD-10-CM

## 2025-04-24 DIAGNOSIS — J45.21 MILD INTERMITTENT REACTIVE AIRWAY DISEASE WITH ACUTE EXACERBATION: ICD-10-CM

## 2025-04-24 DIAGNOSIS — K92.2 GASTROINTESTINAL HEMORRHAGE, UNSPECIFIED GASTROINTESTINAL HEMORRHAGE TYPE: ICD-10-CM

## 2025-04-24 DIAGNOSIS — Z29.11 NEED FOR RSV IMMUNIZATION: ICD-10-CM

## 2025-04-24 DIAGNOSIS — G95.9 MYELOPATHY (HCC): ICD-10-CM

## 2025-04-24 DIAGNOSIS — Z13.29 SCREENING FOR THYROID DISORDER: ICD-10-CM

## 2025-04-24 DIAGNOSIS — E78.5 HYPERLIPIDEMIA, UNSPECIFIED HYPERLIPIDEMIA TYPE: ICD-10-CM

## 2025-04-24 DIAGNOSIS — N39.3 STRESS INCONTINENCE OF URINE: ICD-10-CM

## 2025-04-24 DIAGNOSIS — D64.9 ANEMIA, UNSPECIFIED TYPE: ICD-10-CM

## 2025-04-24 PROBLEM — Z90.79 H/O RADICAL PROSTATECTOMY: Status: RESOLVED | Noted: 2022-08-21 | Resolved: 2025-04-24

## 2025-04-24 PROCEDURE — 99214 OFFICE O/P EST MOD 30 MIN: CPT | Performed by: FAMILY MEDICINE

## 2025-04-24 PROCEDURE — 99213 OFFICE O/P EST LOW 20 MIN: CPT | Performed by: PHYSICIAN ASSISTANT

## 2025-04-24 RX ORDER — FAMOTIDINE 20 MG/1
20 TABLET, FILM COATED ORAL 2 TIMES DAILY
Qty: 60 TABLET | Refills: 0 | Status: SHIPPED | OUTPATIENT
Start: 2025-04-24 | End: 2026-04-19

## 2025-04-24 RX ORDER — TADALAFIL 5 MG/1
5 TABLET ORAL EVERY MORNING
Qty: 90 TABLET | Refills: 3 | Status: SHIPPED | OUTPATIENT
Start: 2025-04-24

## 2025-04-24 NOTE — PROGRESS NOTES
Name: Easton Julio      : 1960      MRN: [id number]  Encounter Provider: Frantz Gross DO  Encounter Date: 2025   Encounter department: [facility]  :  Assessment & Plan  Primary hypertension  Blood pressure appears stable.  At this time, continue with routine home monitoring as well as current treatment with the irbesartan       Hyperlipidemia, unspecified hyperlipidemia type  Recheck CMP and lipid panel.  Continue with a strict low-fat/low-cholesterol diet as well as current treatment with rosuvastatin.  Orders:    Comprehensive metabolic panel; Future    Lipid Panel with Direct LDL reflex; Future    Prostate cancer (HCC)  Following with urology.  PSA levels have been stable.  Continue with his current treatment.       Gastrointestinal hemorrhage, unspecified gastrointestinal hemorrhage type  Patient has not had any recurrent signs of gastrointestinal hemorrhage.  At this time, he has had reflux in the past.  Will discontinue his pantoprazole and switch him to famotidine.  If he notes any worsening symptoms, he may benefit from returning back to pantoprazole as well as possibly seeing gastroenterology again.  Orders:    famotidine (PEPCID) 20 mg tablet; Take 1 tablet (20 mg total) by mouth 2 (two) times a day    Anemia, unspecified type  Likely due to his gastrointestinal hemorrhage.  Hemoglobin levels have been clinically stable.  Continue with routine monitoring.  Follow-up with patient in 6 months.        Orders:    CBC; Future    Screening for diabetes mellitus    Orders:    Hemoglobin A1C; Future    Screening for thyroid disorder    Orders:    TSH, 3rd generation with Free T4 reflex; Future    Mild intermittent reactive airway disease with acute exacerbation         Myelopathy (HCC)         SVT (supraventricular tachycardia) (HCC)         Need for RSV immunization                History of Present Illness   HPI  Patient is a 64-year-old male presents today as a new  "patient to establish care.  He has hypertension, dyslipidemia, prostate cancer, previous gastrointestinal hemorrhage.  He has been taking his medications regularly.  He denies adverse reactions with his medications.  He states that approximately 9 months ago, he did have an upper endoscopy with a biopsy.  He states that he did have significant bleeding at that time which did require multiple transfusions.  He has been on PPI medication since that time.  His repeat EGDs have all appeared stable.    He has a long history of varicose veins.  He has seen vascular specialist in the past and did have multiple treatments for these problems.  He states that he believes the symptoms have been worsening.  Review of Systems   Constitutional:  Negative for activity change, chills, fatigue and fever.   HENT:  Negative for congestion, ear pain, sinus pressure and sore throat.    Eyes:  Negative for redness, itching and visual disturbance.   Respiratory:  Negative for cough and shortness of breath.    Cardiovascular:  Negative for chest pain and palpitations.   Gastrointestinal:  Negative for abdominal pain, diarrhea and nausea.   Endocrine: Negative for cold intolerance and heat intolerance.   Genitourinary:  Negative for dysuria, flank pain and frequency.   Musculoskeletal:  Negative for arthralgias, back pain, gait problem and myalgias.   Skin:  Negative for color change.   Allergic/Immunologic: Negative for environmental allergies.   Neurological:  Negative for dizziness, numbness and headaches.   Psychiatric/Behavioral:  Negative for behavioral problems and sleep disturbance.        Objective   /80   Pulse 72   Temp 98.2 °F (36.8 °C)   Ht 5' 10\" (1.778 m)   Wt 110 kg (242 lb)   SpO2 97%   BMI 34.72 kg/m²      Physical Exam  Vitals reviewed.   Constitutional:       General: He is not in acute distress.     Appearance: Normal appearance. He is well-developed.   HENT:      Head: Normocephalic and atraumatic.      " Right Ear: Tympanic membrane, ear canal and external ear normal. There is no impacted cerumen.      Left Ear: Tympanic membrane, ear canal and external ear normal. There is no impacted cerumen.      Nose: Nose normal. No congestion or rhinorrhea.      Mouth/Throat:      Mouth: Mucous membranes are moist.      Pharynx: No oropharyngeal exudate or posterior oropharyngeal erythema.   Eyes:      General: No scleral icterus.        Right eye: No discharge.         Left eye: No discharge.      Extraocular Movements: Extraocular movements intact.      Conjunctiva/sclera: Conjunctivae normal.      Pupils: Pupils are equal, round, and reactive to light.   Neck:      Trachea: No tracheal deviation.   Cardiovascular:      Rate and Rhythm: Normal rate and regular rhythm.      Pulses: Normal pulses.           Dorsalis pedis pulses are 2+ on the right side and 2+ on the left side.        Posterior tibial pulses are 2+ on the right side and 2+ on the left side.      Heart sounds: Normal heart sounds. No murmur heard.     No friction rub. No gallop.   Pulmonary:      Effort: Pulmonary effort is normal. No respiratory distress.      Breath sounds: Normal breath sounds. No wheezing, rhonchi or rales.   Abdominal:      General: Bowel sounds are normal. There is no distension.      Palpations: Abdomen is soft.      Tenderness: There is no abdominal tenderness. There is no guarding or rebound.   Musculoskeletal:         General: Normal range of motion.      Cervical back: Normal range of motion and neck supple.      Right lower leg: No edema.      Left lower leg: No edema.   Lymphadenopathy:      Head:      Right side of head: No submental or submandibular adenopathy.      Left side of head: No submental or submandibular adenopathy.      Cervical: No cervical adenopathy.      Right cervical: No superficial, deep or posterior cervical adenopathy.     Left cervical: No superficial, deep or posterior cervical adenopathy.   Skin:      General: Skin is warm and dry.      Findings: No erythema.   Neurological:      General: No focal deficit present.      Mental Status: He is alert and oriented to person, place, and time.      Cranial Nerves: No cranial nerve deficit.      Sensory: Sensation is intact. No sensory deficit.      Motor: Motor function is intact.   Psychiatric:         Attention and Perception: Attention and perception normal.         Mood and Affect: Mood is not anxious or depressed.         Speech: Speech normal.         Behavior: Behavior normal.         Thought Content: Thought content normal.         Judgment: Judgment normal.

## 2025-04-24 NOTE — ASSESSMENT & PLAN NOTE
Recheck CMP and lipid panel.  Continue with a strict low-fat/low-cholesterol diet as well as current treatment with rosuvastatin.  Orders:    Comprehensive metabolic panel; Future    Lipid Panel with Direct LDL reflex; Future

## 2025-04-24 NOTE — ASSESSMENT & PLAN NOTE
Grade group 2 prostate cancer  RALP Aug 2022, negative margins  excellent tx response- undetectable PSAs    Lab Results   Component Value Date    PSA <0.01 04/18/2025    PSA <0.01 03/13/2024       Orders:    PSA Total, Diagnostic; Future    PSA Total, Diagnostic; Future

## 2025-04-24 NOTE — TELEPHONE ENCOUNTER
PA for TADALAFIL 5 MG SUBMITTED to [employer]    via      [x]POL[employer]-Case ID # [id number]       [x]PA sent as URGENT    All office notes, labs and other pertaining documents and studies sent. Clinical questions answered. Awaiting determination from insurance company.     Turnaround time for your insurance to make a decision on your Prior Authorization can take 7-21 business days.

## 2025-04-24 NOTE — ASSESSMENT & PLAN NOTE
s/p AUS July 2023, revised Jan 2024  much improved  seeing Dr Savage next month for f/u. no adjustments expected

## 2025-04-24 NOTE — ASSESSMENT & PLAN NOTE
Blood pressure appears stable.  At this time, continue with routine home monitoring as well as current treatment with the irbesartan

## 2025-04-24 NOTE — PROGRESS NOTES
Name: Easton Julio      : 1960      MRN: [id number]  Encounter Provider: Tamra Rasmussen PA-C  Encounter Date: 2025   Encounter department: [facility] [place] [facility]  :  Assessment & Plan  Prostate cancer (HCC)  Grade group 2 prostate cancer  RALP Aug 2022, negative margins  excellent tx response- undetectable PSAs    Lab Results   Component Value Date    PSA <0.01 2025    PSA <0.01 2024       Orders:    PSA Total, Diagnostic; Future    PSA Total, Diagnostic; Future    Stress incontinence of urine  s/p AUS 2023, revised 2024  much improved  seeing Dr Savage next month for f/u. no adjustments expected       Erectile dysfunction after radical prostatectomy  trimix 30 ICI does well with 0.15-0.25ml  refill faxed to [facility] today  having some spontaneous engorgement mornings or arousal, but not sufficient for penetration, remains hopeful for spotnaneous recovery- will continue the daily cialis for another year  Orders:    tadalafil (CIALIS) 5 MG tablet; Take 1 tablet (5 mg total) by mouth every morning        History of Present Illness   Easton Julio is a 64 y.o. male who presents 6-month prostate cancer follow-up.  Grade group 2 prostate cancer radical prostatectomy 2022, negative surgical margins.  PSAs are undetectable.  He is using standard Trimix 30 for erections.  He did have artificial urinary sphincter placed 2023, revised 2024 for persistent stress urinary continence.  Happy with the final result. Still working this year.    AUA SYMPTOM SCORE      Flowsheet Row Most Recent Value   AUA SYMPTOM SCORE    How often have you had a sensation of not emptying your bladder completely after you finished urinating? 1 (P)     How often have you had to urinate again less than two hours after you finished urinating? 1 (P)     How often have you found you stopped and started again several times when you urinate? 0 (P)     How often have  "you found it difficult to postpone urination? 2 (P)     How often have you had a weak urinary stream? 0 (P)     How often have you had to push or strain to begin urination? 0 (P)     How many times did you most typically get up to urinate from the time you went to bed at night until the time you got up in the morning? 0 (P)     Quality of Life: If you were to spend the rest of your life with your urinary condition just the way it is now, how would you feel about that? 2 (P)     AUA SYMPTOM SCORE 4 (P)            Review of Systems   Constitutional: Negative.    Respiratory: Negative.     Cardiovascular: Negative.    Genitourinary:  Negative for decreased urine volume, difficulty urinating, dysuria, flank pain, frequency, hematuria, testicular pain and urgency.   Musculoskeletal: Negative.           Objective   /68 (BP Location: Left arm, Patient Position: Sitting, Cuff Size: Standard)   Pulse 89   Ht 5' 9\" (1.753 m)   Wt 110 kg (242 lb)   SpO2 97%   BMI 35.74 kg/m²     Physical Exam  Vitals and nursing note reviewed.   Constitutional:       General: He is not in acute distress.     Appearance: He is well-developed. He is not diaphoretic.   HENT:      Head: Normocephalic and atraumatic.   Pulmonary:      Effort: Pulmonary effort is normal.   Musculoskeletal:      Right lower leg: No edema.      Left lower leg: No edema.   Skin:     General: Skin is warm.   Neurological:      Mental Status: He is alert and oriented to person, place, and time.           Results   Lab Results   Component Value Date    PSA <0.01 04/18/2025    PSA <0.01 03/13/2024     Lab Results   Component Value Date    CALCIUM 9.3 10/26/2024    K 4.5 10/26/2024    CO2 26 10/26/2024     10/26/2024    BUN 20 10/26/2024    CREATININE 1.10 10/26/2024     Lab Results   Component Value Date    WBC 6.1 07/30/2024    HGB 10.8 (L) 07/30/2024    HCT 32.4 (L) 07/30/2024    MCV 88 07/30/2024     07/30/2024       Office Urine Dip  No results " found for this or any previous visit (from the past hour).

## 2025-04-24 NOTE — ASSESSMENT & PLAN NOTE
trimix 20/1/30 ICI does well with 0.15-0.25ml  refill faxed to [facility] today  having some spontaneous engorgement mornings or arousal, but not sufficient for penetration, remains hopeful for spotnaneous recovery- will continue the daily cialis for another year  Orders:    tadalafil (CIALIS) 5 MG tablet; Take 1 tablet (5 mg total) by mouth every morning

## 2025-04-25 NOTE — TELEPHONE ENCOUNTER
PA for TADALAFIL 5 MG  DENIED    PLAN [employer] DENIED MEDICATION FOR ED DESPITE RADICAL PROSTATECTOMY     Reason:(Screenshot if applicable)      PT CAN USE GOOD RX    PHARMACY MADE AWARE OF DENIAL

## 2025-04-28 ENCOUNTER — TELEPHONE (OUTPATIENT)
Dept: FAMILY MEDICINE CLINIC | Facility: CLINIC | Age: 65
End: 2025-04-28

## 2025-04-28 NOTE — TELEPHONE ENCOUNTER
PA for (PEPCID) 20 mg tablet SUBMITTED to     via    []CMM-KEY:   [x]Surescripts-Case ID #   []Availity-Auth ID # NDC #   []Faxed to plan   []Other website   []Phone call Case ID #     [x]PA sent as URGENT    All office notes, labs and other pertaining documents and studies sent. Clinical questions answered. Awaiting determination from insurance company.     Turnaround time for your insurance to make a decision on your Prior Authorization can take 7-21 business days.

## 2025-05-03 ENCOUNTER — RESULTS FOLLOW-UP (OUTPATIENT)
Dept: FAMILY MEDICINE CLINIC | Facility: CLINIC | Age: 65
End: 2025-05-03

## 2025-05-03 LAB
ALBUMIN SERPL-MCNC: 4.4 G/DL (ref 3.5–5.7)
ALP SERPL-CCNC: 72 U/L (ref 35–120)
ALT SERPL-CCNC: 20 U/L
ANION GAP SERPL CALCULATED.3IONS-SCNC: 7 MMOL/L (ref 3–11)
AST SERPL-CCNC: 16 U/L
BILIRUB SERPL-MCNC: 0.6 MG/DL (ref 0.2–1)
BUN SERPL-MCNC: 17 MG/DL (ref 7–28)
CALCIUM SERPL-MCNC: 9.3 MG/DL (ref 8.5–10.5)
CHLORIDE SERPL-SCNC: 105 MMOL/L (ref 100–109)
CHOLEST SERPL-MCNC: 126 MG/DL
CHOLEST/HDLC SERPL: 2.6 {RATIO}
CO2 SERPL-SCNC: 27 MMOL/L (ref 21–31)
CREAT SERPL-MCNC: 1.05 MG/DL (ref 0.53–1.3)
CYTOLOGY CMNT CVX/VAG CYTO-IMP: NORMAL
ERYTHROCYTE [DISTWIDTH] IN BLOOD BY AUTOMATED COUNT: 15.8 % (ref 12–16)
EST. AVERAGE GLUCOSE BLD GHB EST-MCNC: 114 MG/DL
GFR/BSA.PRED SERPLBLD CYS-BASED-ARV: 78 ML/MIN/{1.73_M2}
GLUCOSE SERPL-MCNC: 97 MG/DL (ref 65–99)
HBA1C MFR BLD: 5.6 %
HCT VFR BLD AUTO: 43.7 % (ref 37–48)
HDLC SERPL-MCNC: 49 MG/DL (ref 23–92)
HGB BLD-MCNC: 15 G/DL (ref 12.5–17)
LDLC SERPL CALC-MCNC: 54 MG/DL
MCH RBC QN AUTO: 28.5 PG (ref 27–36)
MCHC RBC AUTO-ENTMCNC: 34.3 G/DL (ref 32–37)
MCV RBC AUTO: 83 FL (ref 80–100)
NONHDLC SERPL-MCNC: 77 MG/DL
PLATELET # BLD AUTO: 215 THOU/CMM (ref 140–350)
PMV BLD REES-ECKER: 8.2 FL (ref 7.5–11.3)
POTASSIUM SERPL-SCNC: 4.4 MMOL/L (ref 3.5–5.2)
PROT SERPL-MCNC: 7 G/DL (ref 6.3–8.3)
RBC # BLD AUTO: 5.27 MILL/CMM (ref 4–5.4)
SODIUM SERPL-SCNC: 139 MMOL/L (ref 135–145)
TRIGL SERPL-MCNC: 114 MG/DL
TSH SERPL-ACNC: 2.65 UIU/ML (ref 0.45–5.33)
WBC # BLD AUTO: 5.3 THOU/CMM (ref 4–10.5)

## 2025-07-08 DIAGNOSIS — K92.2 GASTROINTESTINAL HEMORRHAGE, UNSPECIFIED GASTROINTESTINAL HEMORRHAGE TYPE: ICD-10-CM

## 2025-07-09 RX ORDER — FAMOTIDINE 20 MG/1
20 TABLET, FILM COATED ORAL 2 TIMES DAILY
Qty: 60 TABLET | Refills: 0 | Status: SHIPPED | OUTPATIENT
Start: 2025-07-09 | End: 2026-07-04

## 2025-08-04 DIAGNOSIS — K92.2 GASTROINTESTINAL HEMORRHAGE, UNSPECIFIED GASTROINTESTINAL HEMORRHAGE TYPE: ICD-10-CM

## 2025-08-05 RX ORDER — FAMOTIDINE 20 MG/1
20 TABLET, FILM COATED ORAL 2 TIMES DAILY
Qty: 60 TABLET | Refills: 0 | Status: SHIPPED | OUTPATIENT
Start: 2025-08-05

## 2025-08-18 ENCOUNTER — OFFICE VISIT (OUTPATIENT)
Dept: URGENT CARE | Facility: CLINIC | Age: 65
End: 2025-08-18
Payer: COMMERCIAL

## 2025-08-18 VITALS
TEMPERATURE: 97 F | HEART RATE: 83 BPM | RESPIRATION RATE: 18 BRPM | SYSTOLIC BLOOD PRESSURE: 122 MMHG | OXYGEN SATURATION: 96 % | DIASTOLIC BLOOD PRESSURE: 82 MMHG

## 2025-08-18 DIAGNOSIS — L23.7 ALLERGIC CONTACT DERMATITIS DUE TO PLANTS, EXCEPT FOOD: Primary | ICD-10-CM

## 2025-08-18 PROCEDURE — S9083 URGENT CARE CENTER GLOBAL: HCPCS | Performed by: NURSE PRACTITIONER

## 2025-08-18 PROCEDURE — G0382 LEV 3 HOSP TYPE B ED VISIT: HCPCS | Performed by: NURSE PRACTITIONER

## 2025-08-18 RX ORDER — MUPIROCIN 2 %
OINTMENT (GRAM) TOPICAL 2 TIMES DAILY
Qty: 22 G | Refills: 0 | Status: SHIPPED | OUTPATIENT
Start: 2025-08-18

## 2025-08-18 RX ORDER — PREDNISONE 10 MG/1
TABLET ORAL
Qty: 30 TABLET | Refills: 0 | Status: SHIPPED | OUTPATIENT
Start: 2025-08-18 | End: 2025-08-30

## (undated) DEVICE — ADHESIVE SKIN HIGH VISCOSITY EXOFIN 1ML

## (undated) DEVICE — COBRA GRASPER: Brand: ENDOWRIST

## (undated) DEVICE — JP CHANNEL DRAIN 19FR, FULL FLUTES: Brand: JACKSON-PRATT

## (undated) DEVICE — SUT PDS II 0 CT-1 27 IN Z340H

## (undated) DEVICE — SYRINGE 10ML LL

## (undated) DEVICE — JACKSON-PRATT 100CC BULB RESERVOIR: Brand: CARDINAL HEALTH

## (undated) DEVICE — INTENDED FOR TISSUE SEPARATION, AND OTHER PROCEDURES THAT REQUIRE A SHARP SURGICAL BLADE TO PUNCTURE OR CUT.: Brand: BARD-PARKER SAFETY BLADES SIZE 15, STERILE

## (undated) DEVICE — GLOVE SRG BIOGEL 8

## (undated) DEVICE — TELFA NON-ADHERENT ABSORBENT DRESSING: Brand: TELFA

## (undated) DEVICE — VISUALIZATION SYSTEM: Brand: CLEARIFY

## (undated) DEVICE — SUT VLOC 90 3-0 90 CV-23 L9 IN VLOCM1944

## (undated) DEVICE — BLADELESS OBTURATOR: Brand: WECK VISTA

## (undated) DEVICE — TROCAR PORT ACCESS 12 X120MM W/BLDLS OPTICAL TIP AIRSEAL

## (undated) DEVICE — 3M™ DURAPORE™ SURGICAL TAPE 1538-3, 3 INCH X 10 YARD (7,5CM X 9,1M), 4 ROLLS/BOX: Brand: 3M™ DURAPORE™

## (undated) DEVICE — AIRSEAL TUBE SMOKE EVAC LUMENX3 FILTERED

## (undated) DEVICE — 3M™ TRANSPORE™ WHITE SURGICAL TAPE 1534-1, 1 INCH X 10 YARD (2,5CM X 9,1M), 12 ROLLS/CARTON 10 CARTONS/CASE: Brand: 3M™ TRANSPORE™

## (undated) DEVICE — SURGICEL 4 X 8

## (undated) DEVICE — ASTOUND STANDARD SURGICAL GOWN, XL: Brand: CONVERTORS

## (undated) DEVICE — DRAPE SHEET X-LG

## (undated) DEVICE — CHLORAPREP HI-LITE 26ML ORANGE

## (undated) DEVICE — MAX-CORE® DISPOSABLE CORE BIOPSY INSTRUMENT, 18G X 25CM: Brand: MAX-CORE

## (undated) DEVICE — SPECIMEN CONTAINER STERILE PEEL PACK

## (undated) DEVICE — SUT VICRYL 0 UR-6 27 IN J603H

## (undated) DEVICE — KERLIX BANDAGE ROLL: Brand: KERLIX

## (undated) DEVICE — LUBRICANT INST ELECTROLUBE ANTISTK WO PAD

## (undated) DEVICE — URIMETER 2500ML

## (undated) DEVICE — SYSTEM TRANSPERINEAL ACCESS PRECISIONPOINT

## (undated) DEVICE — CATH FOLEY 20FR 5ML 2WAY LUBRICATH

## (undated) DEVICE — CATH FOLEY 18FR 5ML 2WAY LUBRICATH

## (undated) DEVICE — HEM-O-LOK CLIP CARTRIDGE LARGE DA VINCI SI/XI

## (undated) DEVICE — MARYLAND BIPOLAR FORCEPS: Brand: ENDOWRIST

## (undated) DEVICE — COLUMN DRAPE

## (undated) DEVICE — NEEDLE 25G X 1 1/2

## (undated) DEVICE — GAUZE SPONGES,16 PLY: Brand: CURITY

## (undated) DEVICE — MONOPOLAR CURVED SCISSORS: Brand: ENDOWRIST

## (undated) DEVICE — HEAVY DUTY TABLE COVER: Brand: CONVERTORS

## (undated) DEVICE — TROCAR: Brand: KII FIOS FIRST ENTRY

## (undated) DEVICE — SUT MONOCRYL 4-0 PS-2 27 IN Y426H

## (undated) DEVICE — ENDOPATH PNEUMONEEDLE INSUFFLATION NEEDLES WITH LUER LOCK CONNECTORS 120MM: Brand: ENDOPATH

## (undated) DEVICE — 3M™ IOBAN™ 2 ANTIMICROBIAL INCISE DRAPE 6640EZ: Brand: IOBAN™ 2

## (undated) DEVICE — INTENDED FOR TISSUE SEPARATION, AND OTHER PROCEDURES THAT REQUIRE A SHARP SURGICAL BLADE TO PUNCTURE OR CUT.: Brand: BARD-PARKER SAFETY BLADES SIZE 11, STERILE

## (undated) DEVICE — ARM DRAPE

## (undated) DEVICE — TISSUE RETRIEVAL SYSTEM: Brand: INZII RETRIEVAL SYSTEM

## (undated) DEVICE — STERILE SURGICAL LUBRICANT,  TUBE: Brand: SURGILUBE

## (undated) DEVICE — PREP PAD BNS: Brand: CONVERTORS

## (undated) DEVICE — LARGE NEEDLE DRIVER: Brand: ENDOWRIST

## (undated) DEVICE — CANNULA SEAL

## (undated) DEVICE — SUT VICRYL 0 CT-1 27 IN J260H

## (undated) DEVICE — GLOVE INDICATOR PI UNDERGLOVE SZ 8 BLUE

## (undated) DEVICE — GLOVE SRG BIOGEL ORTHOPEDIC 7.5

## (undated) DEVICE — UTILITY MARKER,BLACK WITH LABELS: Brand: DEVON

## (undated) DEVICE — DRAPE EQUIPMENT RF WAND

## (undated) DEVICE — SUT VLOC 90 3-0 CV-23 9 IN VLOCM0844

## (undated) DEVICE — HEMOSTATIC MATRIX SURGIFLO 8ML W/THROMBIN

## (undated) DEVICE — SUT ETHILON 3-0 FS-1 18 IN 663G

## (undated) DEVICE — 40595 XL TRENDELENBURG POSITIONING KIT: Brand: 40595 XL TRENDELENBURG POSITIONING KIT

## (undated) DEVICE — KIT, BETHLEHEM ROBOTIC PROST: Brand: CARDINAL HEALTH